# Patient Record
Sex: FEMALE | Race: WHITE | NOT HISPANIC OR LATINO | Employment: PART TIME
[De-identification: names, ages, dates, MRNs, and addresses within clinical notes are randomized per-mention and may not be internally consistent; named-entity substitution may affect disease eponyms.]

---

## 2016-09-01 LAB — PAP-ABSTRACT: NORMAL

## 2017-06-03 ENCOUNTER — HEALTH MAINTENANCE LETTER (OUTPATIENT)
Age: 62
End: 2017-06-03

## 2017-07-27 ENCOUNTER — HOSPITAL ENCOUNTER (OUTPATIENT)
Dept: MAMMOGRAPHY | Facility: CLINIC | Age: 62
Discharge: HOME | End: 2017-07-27
Attending: OBSTETRICS & GYNECOLOGY | Admitting: OBSTETRICS & GYNECOLOGY
Payer: COMMERCIAL

## 2017-07-27 DIAGNOSIS — Z12.31 VISIT FOR SCREENING MAMMOGRAM: ICD-10-CM

## 2017-07-27 PROCEDURE — 77063 BREAST TOMOSYNTHESIS BI: CPT

## 2017-07-27 PROCEDURE — G0202 SCR MAMMO BI INCL CAD: HCPCS

## 2017-08-24 ENCOUNTER — RADIANT APPOINTMENT (OUTPATIENT)
Dept: GENERAL RADIOLOGY | Facility: CLINIC | Age: 62
End: 2017-08-24
Attending: PHYSICIAN ASSISTANT
Payer: COMMERCIAL

## 2017-08-24 ENCOUNTER — OFFICE VISIT (OUTPATIENT)
Dept: FAMILY MEDICINE | Facility: CLINIC | Age: 62
End: 2017-08-24
Payer: COMMERCIAL

## 2017-08-24 VITALS
DIASTOLIC BLOOD PRESSURE: 70 MMHG | HEIGHT: 65 IN | HEART RATE: 78 BPM | WEIGHT: 158.4 LBS | TEMPERATURE: 97.8 F | BODY MASS INDEX: 26.39 KG/M2 | SYSTOLIC BLOOD PRESSURE: 120 MMHG | OXYGEN SATURATION: 99 %

## 2017-08-24 DIAGNOSIS — R05.3 CHRONIC COUGH: ICD-10-CM

## 2017-08-24 DIAGNOSIS — M25.552 HIP PAIN, LEFT: ICD-10-CM

## 2017-08-24 DIAGNOSIS — M16.12 PRIMARY OSTEOARTHRITIS OF LEFT HIP: Primary | ICD-10-CM

## 2017-08-24 PROCEDURE — 99214 OFFICE O/P EST MOD 30 MIN: CPT | Performed by: PHYSICIAN ASSISTANT

## 2017-08-24 PROCEDURE — 73502 X-RAY EXAM HIP UNI 2-3 VIEWS: CPT

## 2017-08-24 RX ORDER — HYDROCODONE BITARTRATE AND ACETAMINOPHEN 5; 325 MG/1; MG/1
1 TABLET ORAL EVERY 4 HOURS PRN
Qty: 20 TABLET | Refills: 0 | Status: SHIPPED | OUTPATIENT
Start: 2017-08-24 | End: 2017-12-30

## 2017-08-24 RX ORDER — MONTELUKAST SODIUM 10 MG/1
10 TABLET ORAL AT BEDTIME
Qty: 30 TABLET | Refills: 0 | Status: SHIPPED | OUTPATIENT
Start: 2017-08-24 | End: 2017-12-30

## 2017-08-24 RX ORDER — PREDNISONE 20 MG/1
TABLET ORAL
Qty: 20 TABLET | Refills: 0 | Status: SHIPPED | OUTPATIENT
Start: 2017-08-24 | End: 2017-12-30

## 2017-08-24 NOTE — NURSING NOTE
"Chief Complaint   Patient presents with     Back Pain     pain in LL back times 1 day no known inury        Initial /70  Pulse 78  Temp 97.8  F (36.6  C) (Oral)  Ht 5' 5\" (1.651 m)  Wt 158 lb 6.4 oz (71.8 kg)  LMP 08/08/2009  SpO2 99%  BMI 26.36 kg/m2 Estimated body mass index is 26.36 kg/(m^2) as calculated from the following:    Height as of this encounter: 5' 5\" (1.651 m).    Weight as of this encounter: 158 lb 6.4 oz (71.8 kg).  Medication Reconciliation: complete  "

## 2017-08-24 NOTE — PROGRESS NOTES
"  SUBJECTIVE:   Denia Dailey is a 62 year old female who presents to clinic today for the following health issues:    Back Pain     Duration: 1 day         Specific cause: none     Description:   Location of pain: L low back, L lateral hip and buttock  Character of pain: Burning  Pain radiation: radiating to anterior groin and L thigh but pain does not extend beyond knee   New numbness or weakness in legs, not attributed to pain:  no     History:   History of back problems: multilevel degenerative disc disease. 2007 discectomy of L5 and S1 from a work injury   Any previous MRI or X-rays: Yes - MRI lumbar spine (7/7/07) & L knee (10/4/14)   Therapies tried without relief: NSAIDs    Alleviating factors:   Improved by: Nothing      Precipitating factors:  Worsened by: Sitting, Lying Flat and Walking    Denia has a PMH of multilevel degenerative disc disease and discectomy (2007). MRI in 2007 showed \"L5-S1: Prominent right disc extrusion with cranial extension of disc material and mass effect on the L5 and S1 nerve roots.\"  Yesterday Denia began feeling a burning pain in her low back when walking in her yard which radiated to locations listed above. No trauma, no falls, but she was sitting in a car all weekend. For mild relief Denia has been taking 800 mg of Ibuprofen every 4-5 hours.    Chronic Cough  Extensive workup by pulmonology.  3 year history that is associated with talking.  Was recommended gabapentin. Has tried PPI's, Claritin, albuterol, nasal spray, Zantac with no relief. Had endoscopy 1.5 years ago, diagnosed with gastritis, prescribed PPI and contracted Cdiff and very apprehensive to take these again for fear of recurrence.  Uses Zantac PRN but does not associate coughing with food ingestion.  Denia was a smoker in her 20's, CT scan of chest showed granulomas in 2016.  Also had spirometry that was normal.  Bronchoscopy was normal.  Wondering about possible options.  Pulmonology thought next step should " be polysomnography.    Problem list and histories reviewed & adjusted, as indicated.  Additional history: as documented    Patient Active Problem List   Diagnosis     Thoracic or lumbosacral neuritis or radiculitis, unspecified     Lumbago     Advanced directives, counseling/discussion     GERD (gastroesophageal reflux disease)     Chronic cough     Colitis due to Clostridium difficile     Past Surgical History:   Procedure Laterality Date     C CLOSED RX NOSE/JAW FRAC+WIRES  1966     C DISKECTOMY,PERCUTANEOUS LUMBAR  7/10/07    L5-S1 with nerve root decompression     ESOPHAGOSCOPY, GASTROSCOPY, DUODENOSCOPY (EGD), COMBINED N/A 3/15/2016    chronic gastritis     HC COLONOSCOPY THRU STOMA, DIAGNOSTIC  7/09    Diverticulosis.   Needs repeat in 10 yrs     ORTHOPEDIC SURGERY      bummionectomy     UPPER GI ENDOSCOPY  3/16    Erythematous mucosa in the greater curvature.        Social History   Substance Use Topics     Smoking status: Former Smoker     Smokeless tobacco: Never Used      Comment: quit age 33     Alcohol use Yes      Comment: 1-2 per month     Family History   Problem Relation Age of Onset     CANCER Father      renal cell CA     Breast Cancer Maternal Aunt      Coronary Artery Disease Maternal Grandmother      Coronary Artery Disease Paternal Grandmother      Coronary Artery Disease Paternal Uncle          Current Outpatient Prescriptions   Medication Sig Dispense Refill     predniSONE (DELTASONE) 20 MG tablet Take 60 mg daily for 3 days, then 40 mg daily for 3 days, then 20 mg daily for 3 days, then 10 mg for 4 days 20 tablet 0     HYDROcodone-acetaminophen (NORCO) 5-325 MG per tablet Take 1 tablet by mouth every 4 hours as needed for pain maximum 4 tablet(s) per day 20 tablet 0     montelukast (SINGULAIR) 10 MG tablet Take 1 tablet (10 mg) by mouth At Bedtime 30 tablet 0     Cholecalciferol (VITAMIN D) 2000 UNITS tablet Take 2,000 Units by mouth daily 100 tablet 3     magnesium 250 MG tablet Take 1  "tablet by mouth daily. 100 tablet 3     CALCIUM + D 600-200 MG-UNIT OR TABS TWICE A DAY       Allergies   Allergen Reactions     Vioxx      stomach upset     Nickel Rash     Accompanied by itching and swelling     Reviewed and updated as needed this visit by clinical staff  fTobacco  Allergies  Meds  Problems  Med Hx  Surg Hx  Fam Hx  Soc Hx        Reviewed and updated as needed this visit by Provider  Tobacco  Allergies  Meds  Problems  Med Hx  Surg Hx  Fam Hx  Soc Hx        ROS:  Constitutional, HEENT, cardiovascular, pulmonary, GI, , musculoskeletal, neuro, skin, endocrine and psych systems are negative, except as otherwise noted.    This document serves as a record of the services and decisions personally performed and made by Minerva Lechuga PA-C. It was created on her behalf by Pillo Patel, a trained medical scribe. The creation of this document is based the provider's statements to the medical scribe.  Scribmahesh Patel 11:36 AM, August 24, 2017    OBJECTIVE:   /70  Pulse 78  Temp 97.8  F (36.6  C) (Oral)  Ht 5' 5\" (1.651 m)  Wt 158 lb 6.4 oz (71.8 kg)  LMP 08/08/2009  SpO2 99%  BMI 26.36 kg/m2  Body mass index is 26.36 kg/(m^2).     GENERAL: healthy, alert and no distress  MS: No tenderness in sciatic notch, perilumbar musculature, SI joint, or greater trochanter. no gross musculoskeletal defects noted, no edema  SKIN: no suspicious lesions or rashes  NEURO: Normal strength and tone, mentation intact and speech normal  PSYCH: mentation appears normal, affect normal/bright    Diagnostic Test Results: Hip XR pelvis with Hip Left 1 view  Impression: Initial impression in clinic found arthritis in L hip joint, radiology impression pending.     Recent Results (from the past 744 hour(s))   XR Pelvis w Hip Left 1 View    Narrative    PELVIS AND HIP LEFT ONE VIEW   8/24/2017 11:57 AM     HISTORY: Pain in left hip.    COMPARISON: 6/8/2006.      Impression    IMPRESSION: " Minimal degenerative change. No acute fracture or  dislocation. No significant change from prior.    RODNEY KIRKPATRICK MD       ASSESSMENT/PLAN:   Denia was seen today for back pain.    Diagnoses and all orders for this visit:    Primary osteoarthritis of left hip; Hip pain, left  History of multilevel degenerative disc disease. S/p discectomy 2007. Pain likely related to hip OA.  Recommend intra-articular injection.  Pt will start prednisone taper and use hydrocodone PRN for nighttime pain.  -     predniSONE (DELTASONE) 20 MG tablet; Take 60 mg daily for 3 days, then 40 mg daily for 3 days, then 20 mg daily for 3 days, then 10 mg for 4 days  -     XR Joint Injection Major Left; Future  -     HYDROcodone-acetaminophen (NORCO) 5-325 MG per tablet; Take 1 tablet by mouth every 4 hours as needed for pain maximum 4 tablet(s) per day  -     XR Pelvis w Hip Left 1 View; Future    Chronic cough  Unclear etiology.  Will do trial of montelukast  -certirizine not helpful.  Extensive workup with bronchoscopy without definitive diagnosis.  [facility] suggested possible polysomnography as next step but pt apprehensive as she does not feel that she has any sleep issues.  They also suggested possible trial of gabapentin which she is interested in.  She has used PPIs in the past but developed C. Diff and is apprehensive to start this again.  Her last EGD did show gastritis in 2016 - she uses Zantac PRN and does not associate her cough with food ingestion.  -     montelukast (SINGULAIR) 10 MG tablet; Take 1 tablet (10 mg) by mouth At Bedtime    The information in this document, created by the medical scribe for me, accurately reflects the services I personally performed and the decisions made by me. I have reviewed and approved this document for accuracy prior to leaving the patient care area.  12:25 AM, 08/24/17    Amisha Giraldo PA-C  [facility]

## 2017-08-24 NOTE — MR AVS SNAPSHOT
After Visit Summary   8/24/2017    Denia Dailey    MRN: [id number]           Patient Information     Date Of Birth          1955        Visit Information        Provider Department      8/24/2017 11:00 AM Amisha Giraldo PA-C [facility]        Today's Diagnoses     Primary osteoarthritis of left hip    -  1    Hip pain, left        Chronic cough           Follow-ups after your visit        Future tests that were ordered for you today     Open Future Orders        Priority Expected Expires Ordered    XR Joint Injection Major Left Routine 8/24/2017 8/24/2018 8/24/2017            Who to contact     If you have questions or need follow up information about today's clinic visit or your schedule please contact [facility] directly at [phone].  Normal or non-critical lab and imaging results will be communicated to you by Kubi Mobihart, letter or phone within 4 business days after the clinic has received the results. If you do not hear from us within 7 days, please contact the clinic through Kubi Mobihart or phone. If you have a critical or abnormal lab result, we will notify you by phone as soon as possible.  Submit refill requests through Herrenschmiede or call your pharmacy and they will forward the refill request to us. Please allow 3 business days for your refill to be completed.          Additional Information About Your Visit        MyChart Information     Herrenschmiede gives you secure access to your electronic health record. If you see a primary care provider, you can also send messages to your care team and make appointments. If you have questions, please call your primary care clinic.  If you do not have a primary care provider, please call [phone] and they will assist you.        Care EveryWhere ID     This is your Care EveryWhere ID. This could be used by other organizations to access your [facility] medical records  [id number]        Your Vitals Were     Pulse  "Temperature Height Last Period Pulse Oximetry BMI (Body Mass Index)    78 97.8  F (36.6  C) (Oral) 5' 5\" (1.651 m) 08/08/2009 99% 26.36 kg/m2       Blood Pressure from Last 3 Encounters:   08/24/17 120/70   06/24/16 107/74   06/03/16 126/86    Weight from Last 3 Encounters:   08/24/17 158 lb 6.4 oz (71.8 kg)   06/03/16 157 lb (71.2 kg)   05/25/16 160 lb (72.6 kg)                 Today's Medication Changes          These changes are accurate as of: 8/24/17 12:24 PM.  If you have any questions, ask your nurse or doctor.               Start taking these medicines.        Dose/Directions    HYDROcodone-acetaminophen 5-325 MG per tablet   Commonly known as:  NORCO   Used for:  Primary osteoarthritis of left hip   Started by:  Amisha Giraldo PA-C        Dose:  1 tablet   Take 1 tablet by mouth every 4 hours as needed for pain maximum 4 tablet(s) per day   Quantity:  20 tablet   Refills:  0       montelukast 10 MG tablet   Commonly known as:  SINGULAIR   Used for:  Chronic cough   Started by:  Amisha Giraldo PA-C        Dose:  10 mg   Take 1 tablet (10 mg) by mouth At Bedtime   Quantity:  30 tablet   Refills:  0       predniSONE 20 MG tablet   Commonly known as:  DELTASONE   Used for:  Primary osteoarthritis of left hip   Started by:  Amisha Giraldo PA-C        Take 60 mg daily for 3 days, then 40 mg daily for 3 days, then 20 mg daily for 3 days, then 10 mg for 4 days   Quantity:  20 tablet   Refills:  0            Where to get your medicines      These medications were sent to [facility] [place], [place] - [address]  [address] [facility] [address] [address]     Phone:  [phone]     montelukast 10 MG tablet    predniSONE 20 MG tablet         Some of these will need a paper prescription and others can be bought over the counter.  Ask your nurse if you have questions.     Bring a paper prescription for each of these medications     HYDROcodone-acetaminophen 5-325 " MG per tablet                Primary Care Provider Office Phone # Fax #    Pa [place] [phone] [address] [facility] [address]        Equal Access to Services     RUBEN CHAPA : Hadii ariane rios stephanieo Soelkeali, waaxda luqadaha, qaybta kaalmada adeambrose, leonard olivas laNbaasohk lynn. So [facility] [phone].    ATENCIÓN: Si habla español, tiene a parikh disposición servicios gratuitos de asistencia lingüística. [facility] [phone].    We comply with applicable federal civil rights laws and [place] laws. We do not discriminate on the basis of race, color, national origin, age, disability sex, sexual orientation or gender identity.            Thank you!     Thank you for choosing [facility]  for your care. Our goal is always to provide you with excellent care. Hearing back from our patients is one way we can continue to improve our services. Please take a few minutes to complete the written survey that you may receive in the mail after your visit with us. Thank you!             Your Updated Medication List - Protect others around you: Learn how to safely use, store and throw away your medicines at www.disposemymeds.org.          This list is accurate as of: 8/24/17 12:24 PM.  Always use your most recent med list.                   Brand Name Dispense Instructions for use Diagnosis    calcium + D 600-200 MG-UNIT Tabs   Generic drug:  calcium carbonate-vitamin D      TWICE A DAY        HYDROcodone-acetaminophen 5-325 MG per tablet    NORCO    20 tablet    Take 1 tablet by mouth every 4 hours as needed for pain maximum 4 tablet(s) per day    Primary osteoarthritis of left hip       magnesium 250 MG tablet     100 tablet    Take 1 tablet by mouth daily.        montelukast 10 MG tablet    SINGULAIR    30 tablet    Take 1 tablet (10 mg) by mouth At Bedtime    Chronic cough       predniSONE 20 MG tablet    DELTASONE    20 tablet    Take 60 mg daily for 3 days,  then 40 mg daily for 3 days, then 20 mg daily for 3 days, then 10 mg for 4 days    Primary osteoarthritis of left hip       vitamin D 2000 UNITS tablet     100 tablet    Take 2,000 Units by mouth daily

## 2017-08-29 DIAGNOSIS — M16.12 PRIMARY OSTEOARTHRITIS OF LEFT HIP: Primary | ICD-10-CM

## 2017-09-06 ENCOUNTER — HOSPITAL ENCOUNTER (OUTPATIENT)
Dept: GENERAL RADIOLOGY | Facility: CLINIC | Age: 62
Discharge: HOME | End: 2017-09-06
Attending: PHYSICIAN ASSISTANT | Admitting: PHYSICIAN ASSISTANT
Payer: COMMERCIAL

## 2017-09-06 VITALS — HEART RATE: 72 BPM | SYSTOLIC BLOOD PRESSURE: 137 MMHG | DIASTOLIC BLOOD PRESSURE: 84 MMHG

## 2017-09-06 DIAGNOSIS — M16.12 PRIMARY OSTEOARTHRITIS OF LEFT HIP: ICD-10-CM

## 2017-09-06 PROCEDURE — 25000128 H RX IP 250 OP 636

## 2017-09-06 PROCEDURE — 25500064 ZZH RX 255 OP 636: Performed by: PHYSICIAN ASSISTANT

## 2017-09-06 PROCEDURE — 25000125 ZZHC RX 250

## 2017-09-06 PROCEDURE — S0020 INJECTION, BUPIVICAINE HYDRO: HCPCS

## 2017-09-06 PROCEDURE — 20610 DRAIN/INJ JOINT/BURSA W/O US: CPT | Mod: LT

## 2017-09-06 RX ORDER — BUPIVACAINE HYDROCHLORIDE 5 MG/ML
4 INJECTION, SOLUTION EPIDURAL; INTRACAUDAL ONCE
Status: COMPLETED | OUTPATIENT
Start: 2017-09-06 | End: 2017-09-06

## 2017-09-06 RX ORDER — BUPIVACAINE HYDROCHLORIDE 5 MG/ML
INJECTION, SOLUTION EPIDURAL; INTRACAUDAL
Status: COMPLETED
Start: 2017-09-06 | End: 2017-09-06

## 2017-09-06 RX ORDER — LIDOCAINE HYDROCHLORIDE 10 MG/ML
INJECTION, SOLUTION INFILTRATION; PERINEURAL
Status: COMPLETED
Start: 2017-09-06 | End: 2017-09-06

## 2017-09-06 RX ORDER — TRIAMCINOLONE ACETONIDE 40 MG/ML
INJECTION, SUSPENSION INTRA-ARTICULAR; INTRAMUSCULAR
Status: COMPLETED
Start: 2017-09-06 | End: 2017-09-06

## 2017-09-06 RX ORDER — TRIAMCINOLONE ACETONIDE 40 MG/ML
40 INJECTION, SUSPENSION INTRA-ARTICULAR; INTRAMUSCULAR ONCE
Status: COMPLETED | OUTPATIENT
Start: 2017-09-06 | End: 2017-09-06

## 2017-09-06 RX ORDER — IOPAMIDOL 408 MG/ML
10 INJECTION, SOLUTION INTRATHECAL ONCE
Status: COMPLETED | OUTPATIENT
Start: 2017-09-06 | End: 2017-09-06

## 2017-09-06 RX ORDER — LIDOCAINE HYDROCHLORIDE 10 MG/ML
5 INJECTION, SOLUTION EPIDURAL; INFILTRATION; INTRACAUDAL; PERINEURAL ONCE
Status: COMPLETED | OUTPATIENT
Start: 2017-09-06 | End: 2017-09-06

## 2017-09-06 RX ADMIN — BUPIVACAINE HYDROCHLORIDE 20 MG: 5 INJECTION, SOLUTION EPIDURAL; INTRACAUDAL; PERINEURAL at 10:49

## 2017-09-06 RX ADMIN — IOPAMIDOL 3 ML: 408 INJECTION, SOLUTION INTRATHECAL at 10:49

## 2017-09-06 RX ADMIN — LIDOCAINE HYDROCHLORIDE 40 MG: 10 INJECTION, SOLUTION EPIDURAL; INFILTRATION; INTRACAUDAL; PERINEURAL at 10:48

## 2017-09-06 RX ADMIN — TRIAMCINOLONE ACETONIDE 40 MG: 40 INJECTION, SUSPENSION INTRA-ARTICULAR; INTRAMUSCULAR at 10:48

## 2017-09-06 RX ADMIN — BUPIVACAINE HYDROCHLORIDE 20 MG: 5 INJECTION, SOLUTION EPIDURAL; INTRACAUDAL at 10:49

## 2017-09-06 RX ADMIN — LIDOCAINE HYDROCHLORIDE 40 MG: 10 INJECTION, SOLUTION INFILTRATION; PERINEURAL at 10:48

## 2017-09-06 NOTE — PROGRESS NOTES
RADIOLOGY PROCEDURE NOTE  Patient name: Denia Dailey  MRN: [id number]  : 1955    Pre-procedure diagnosis: Left hip pain  Post-procedure diagnosis: Same    Procedure Date/Time: 2017  10:38 AM  Procedure: Left hip steroid injection  Estimated blood loss: None  Specimen(s) collected with description: none  The patient tolerated the procedure well with no immediate complications.  Significant findings:none    See imaging dictation for procedural details.    Provider name: Tristin Vizcarra  Assistant(s):None

## 2017-09-06 NOTE — PROGRESS NOTES
Pt tolerated ortho procedure well, post procedure pain level unchanged at 0. There were no complications during procedure. Pt verbalized understanding of written and verbal instructions and left department in stable and satisfactory condition with mother. There is no evidence of bleeding upon discharge.

## 2017-11-05 ENCOUNTER — MYC MEDICAL ADVICE (OUTPATIENT)
Dept: FAMILY MEDICINE | Facility: CLINIC | Age: 62
End: 2017-11-05

## 2017-11-05 DIAGNOSIS — R05.3 CHRONIC COUGH: Primary | ICD-10-CM

## 2017-12-30 ENCOUNTER — OFFICE VISIT (OUTPATIENT)
Dept: FAMILY MEDICINE | Facility: CLINIC | Age: 62
End: 2017-12-30
Payer: COMMERCIAL

## 2017-12-30 VITALS
TEMPERATURE: 97.7 F | BODY MASS INDEX: 24.63 KG/M2 | HEART RATE: 115 BPM | SYSTOLIC BLOOD PRESSURE: 126 MMHG | WEIGHT: 148 LBS | OXYGEN SATURATION: 100 % | DIASTOLIC BLOOD PRESSURE: 74 MMHG

## 2017-12-30 DIAGNOSIS — R63.4 LOSS OF WEIGHT: ICD-10-CM

## 2017-12-30 DIAGNOSIS — Z11.59 NEED FOR HEPATITIS C SCREENING TEST: ICD-10-CM

## 2017-12-30 DIAGNOSIS — R68.81 EARLY SATIETY: ICD-10-CM

## 2017-12-30 DIAGNOSIS — Z00.00 PREVENTATIVE HEALTH CARE: ICD-10-CM

## 2017-12-30 DIAGNOSIS — K21.00 GASTROESOPHAGEAL REFLUX DISEASE WITH ESOPHAGITIS: ICD-10-CM

## 2017-12-30 DIAGNOSIS — H66.011 ACUTE SUPPURATIVE OTITIS MEDIA OF RIGHT EAR WITH SPONTANEOUS RUPTURE OF TYMPANIC MEMBRANE, RECURRENCE NOT SPECIFIED: Primary | ICD-10-CM

## 2017-12-30 DIAGNOSIS — Z12.11 SCREEN FOR COLON CANCER: ICD-10-CM

## 2017-12-30 LAB
BASOPHILS # BLD AUTO: 0 10E9/L (ref 0–0.2)
BASOPHILS NFR BLD AUTO: 0.4 %
DIFFERENTIAL METHOD BLD: NORMAL
EOSINOPHIL # BLD AUTO: 0.1 10E9/L (ref 0–0.7)
EOSINOPHIL NFR BLD AUTO: 1.4 %
ERYTHROCYTE [DISTWIDTH] IN BLOOD BY AUTOMATED COUNT: 13.2 % (ref 10–15)
HCT VFR BLD AUTO: 45.8 % (ref 35–47)
HGB BLD-MCNC: 15.3 G/DL (ref 11.7–15.7)
LYMPHOCYTES # BLD AUTO: 0.8 10E9/L (ref 0.8–5.3)
LYMPHOCYTES NFR BLD AUTO: 10.2 %
MCH RBC QN AUTO: 30.4 PG (ref 26.5–33)
MCHC RBC AUTO-ENTMCNC: 33.4 G/DL (ref 31.5–36.5)
MCV RBC AUTO: 91 FL (ref 78–100)
MONOCYTES # BLD AUTO: 0.7 10E9/L (ref 0–1.3)
MONOCYTES NFR BLD AUTO: 8.2 %
NEUTROPHILS # BLD AUTO: 6.4 10E9/L (ref 1.6–8.3)
NEUTROPHILS NFR BLD AUTO: 79.8 %
PLATELET # BLD AUTO: 259 10E9/L (ref 150–450)
RBC # BLD AUTO: 5.03 10E12/L (ref 3.8–5.2)
WBC # BLD AUTO: 8 10E9/L (ref 4–11)

## 2017-12-30 PROCEDURE — 36415 COLL VENOUS BLD VENIPUNCTURE: CPT | Performed by: PHYSICIAN ASSISTANT

## 2017-12-30 PROCEDURE — 80050 GENERAL HEALTH PANEL: CPT | Performed by: PHYSICIAN ASSISTANT

## 2017-12-30 PROCEDURE — 87389 HIV-1 AG W/HIV-1&-2 AB AG IA: CPT | Performed by: PHYSICIAN ASSISTANT

## 2017-12-30 PROCEDURE — 86803 HEPATITIS C AB TEST: CPT | Performed by: PHYSICIAN ASSISTANT

## 2017-12-30 PROCEDURE — 99214 OFFICE O/P EST MOD 30 MIN: CPT | Performed by: PHYSICIAN ASSISTANT

## 2017-12-30 NOTE — NURSING NOTE
"Chief Complaint   Patient presents with     Ear Problem       Initial /74  Pulse 115  Temp 97.7  F (36.5  C) (Oral)  Wt 148 lb (67.1 kg)  LMP 08/08/2009  SpO2 100%  BMI 24.63 kg/m2 Estimated body mass index is 24.63 kg/(m^2) as calculated from the following:    Height as of 8/24/17: 5' 5\" (1.651 m).    Weight as of this encounter: 148 lb (67.1 kg).  Medication Reconciliation: complete     Alicia Gramajo MA      "

## 2017-12-30 NOTE — MR AVS SNAPSHOT
After Visit Summary   12/30/2017    Denia Dailey    MRN: [id number]           Patient Information     Date Of Birth          1955        Visit Information        Provider Department      12/30/2017 10:20 AM Amisha Giraldo PA-C [facility]        Today's Diagnoses     Acute suppurative otitis media of right ear with spontaneous rupture of tympanic membrane, recurrence not specified    -  1    Loss of weight        Early satiety        Gastroesophageal reflux disease with esophagitis        Need for hepatitis C screening test        Screen for colon cancer           Follow-ups after your visit        Future tests that were ordered for you today     Open Future Orders        Priority Expected Expires Ordered    Fecal colorectal cancer screen (FIT) Routine 1/20/2018 3/24/2018 12/30/2017    US Abdomen Complete Routine  12/30/2018 12/30/2017            Who to contact     If you have questions or need follow up information about today's clinic visit or your schedule please contact [facility] directly at [phone].  Normal or non-critical lab and imaging results will be communicated to you by Kikohart, letter or phone within 4 business days after the clinic has received the results. If you do not hear from us within 7 days, please contact the clinic through Docphint or phone. If you have a critical or abnormal lab result, we will notify you by phone as soon as possible.  Submit refill requests through MRO or call your pharmacy and they will forward the refill request to us. Please allow 3 business days for your refill to be completed.          Additional Information About Your Visit        MyChart Information     MRO gives you secure access to your electronic health record. If you see a primary care provider, you can also send messages to your care team and make appointments. If you have questions, please call your primary care clinic.  If you do not have  a primary care provider, please call [phone] and they will assist you.        Care EveryWhere ID     This is your Care EveryWhere ID. This could be used by other organizations to access your [facility] medical records  [id number]        Your Vitals Were     Pulse Temperature Last Period Pulse Oximetry BMI (Body Mass Index)       115 97.7  F (36.5  C) (Oral) 08/08/2009 100% 24.63 kg/m2        Blood Pressure from Last 3 Encounters:   12/30/17 126/74   09/06/17 137/84   08/24/17 120/70    Weight from Last 3 Encounters:   12/30/17 148 lb (67.1 kg)   08/24/17 158 lb 6.4 oz (71.8 kg)   06/03/16 157 lb (71.2 kg)              We Performed the Following     CBC with platelets and differential     Comprehensive metabolic panel     Hepatitis C Screen Reflex to HCV RNA Quant and Genotype     HIV Antigen Antibody Combo     TSH with free T4 reflex          Today's Medication Changes          These changes are accurate as of: 12/30/17 10:43 AM.  If you have any questions, ask your nurse or doctor.               Start taking these medicines.        Dose/Directions    amoxicillin-clavulanate 875-125 MG per tablet   Commonly known as:  AUGMENTIN   Used for:  Acute suppurative otitis media of right ear with spontaneous rupture of tympanic membrane, recurrence not specified   Started by:  Amisha Giraldo PA-C        Dose:  1 tablet   Take 1 tablet by mouth 2 times daily for 10 days   Quantity:  20 tablet   Refills:  0       ranitidine 150 MG tablet   Commonly known as:  ZANTAC   Used for:  Gastroesophageal reflux disease with esophagitis   Started by:  Amisha Giraldo PA-C        Dose:  150 mg   Take 1 tablet (150 mg) by mouth 2 times daily   Quantity:  60 tablet   Refills:  1            Where to get your medicines      These medications were sent to [facility] [place] [facility] [address] [place] [address]  [address] [place] [address]     Phone:  [phone]      amoxicillin-clavulanate 875-125 MG per tablet         Some of these will need a paper prescription and others can be bought over the counter.  Ask your nurse if you have questions.     You don't need a prescription for these medications     ranitidine 150 MG tablet                Primary Care Provider Office Phone # Fax #    Pa [place] [phone]       [address] [place] [address]        Equal Access to Services     RUBEN CHAPA : Hadii aad ku hadasho Soomaali, waaxda luqadaha, qaybta kaalmada adeegyada, waxay idiin hayaan adelarissa mercadovidyaeric lalee . So [facility] [phone].    ATENCIÓN: Si rupa pang, tiene a parikh disposición servicios gratuitos de asistencia lingüística. [facility] [phone].    We comply with applicable federal civil rights laws and [place] laws. We do not discriminate on the basis of race, color, national origin, age, disability, sex, sexual orientation, or gender identity.            Thank you!     Thank you for choosing [facility]  for your care. Our goal is always to provide you with excellent care. Hearing back from our patients is one way we can continue to improve our services. Please take a few minutes to complete the written survey that you may receive in the mail after your visit with us. Thank you!             Your Updated Medication List - Protect others around you: Learn how to safely use, store and throw away your medicines at www.disposemymeds.org.          This list is accurate as of: 12/30/17 10:43 AM.  Always use your most recent med list.                   Brand Name Dispense Instructions for use Diagnosis    amoxicillin-clavulanate 875-125 MG per tablet    AUGMENTIN    20 tablet    Take 1 tablet by mouth 2 times daily for 10 days    Acute suppurative otitis media of right ear with spontaneous rupture of tympanic membrane, recurrence not specified       calcium + D 600-200 MG-UNIT Tabs   Generic drug:  calcium carbonate-vitamin D       TWICE A DAY        fluticasone-vilanterol 200-25 MCG/INH oral inhaler    BREO ELLIPTA    1 Inhaler    Inhale 1 puff into the lungs daily    Chronic cough       magnesium 250 MG tablet     100 tablet    Take 1 tablet by mouth daily.        ranitidine 150 MG tablet    ZANTAC    60 tablet    Take 1 tablet (150 mg) by mouth 2 times daily    Gastroesophageal reflux disease with esophagitis       vitamin D 2000 UNITS tablet     100 tablet    Take 2,000 Units by mouth daily

## 2017-12-30 NOTE — PROGRESS NOTES
SUBJECTIVE:   Denia Dailey is a 62 year old female who presents to clinic today for the following health issues:      Acute Illness   Acute illness concerns: bleeding from ear, sinus symptoms x 1 week.  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  Had EGD 2016 that showed chronic gastritis but pt denies significant symptoms.  Normal colonoscopy 2009.  Denies dark stools or any other symptoms.    Wt Readings from Last 5 Encounters:   12/30/17 148 lb (67.1 kg)   08/24/17 158 lb 6.4 oz (71.8 kg)   06/03/16 157 lb (71.2 kg)   05/25/16 160 lb (72.6 kg)   05/09/16 161 lb 1 oz (73.1 kg)       Problem list and histories reviewed & adjusted, as indicated.  Additional history: as documented    Patient Active Problem List   Diagnosis     Thoracic or lumbosacral neuritis or radiculitis, unspecified     Lumbago     Advanced directives, counseling/discussion     GERD (gastroesophageal reflux disease)     Chronic cough     Colitis due to Clostridium difficile     Past Surgical History:   Procedure Laterality Date     C CLOSED RX NOSE/JAW FRAC+WIRES  1966     C DISKECTOMY,PERCUTANEOUS LUMBAR  7/10/07    L5-S1 with nerve root decompression     ESOPHAGOSCOPY, GASTROSCOPY, DUODENOSCOPY (EGD), COMBINED N/A 3/15/2016    chronic gastritis     HC COLONOSCOPY THRU STOMA, DIAGNOSTIC  7/09    Diverticulosis.   Needs repeat in 10 yrs     ORTHOPEDIC SURGERY       bummionectomy     UPPER GI ENDOSCOPY  3/16    Erythematous mucosa in the greater curvature.        Social History   Substance Use Topics     Smoking status: Former Smoker     Smokeless tobacco: Never Used      Comment: quit age 33     Alcohol use Yes      Comment: 1-2 per month     Family History   Problem Relation Age of Onset     CANCER Father      renal cell CA     Breast Cancer Maternal Aunt      Coronary Artery Disease Maternal Grandmother      Coronary Artery Disease Paternal Grandmother      Coronary Artery Disease Paternal Uncle          Current Outpatient Prescriptions   Medication Sig Dispense Refill     amoxicillin-clavulanate (AUGMENTIN) 875-125 MG per tablet Take 1 tablet by mouth 2 times daily for 10 days 20 tablet 0     ranitidine (ZANTAC) 150 MG tablet Take 1 tablet (150 mg) by mouth 2 times daily 60 tablet 1     fluticasone-vilanterol (BREO ELLIPTA) 200-25 MCG/INH oral inhaler Inhale 1 puff into the lungs daily 1 Inhaler 3     Cholecalciferol (VITAMIN D) 2000 UNITS tablet Take 2,000 Units by mouth daily 100 tablet 3     CALCIUM + D 600-200 MG-UNIT OR TABS TWICE A DAY       magnesium 250 MG tablet Take 1 tablet by mouth daily. 100 tablet 3     Allergies   Allergen Reactions     Vioxx      stomach upset     Nickel Rash     Accompanied by itching and swelling     BP Readings from Last 3 Encounters:   12/30/17 126/74   09/06/17 137/84   08/24/17 120/70    Wt Readings from Last 3 Encounters:   12/30/17 148 lb (67.1 kg)   08/24/17 158 lb 6.4 oz (71.8 kg)   06/03/16 157 lb (71.2 kg)                    Reviewed and updated as needed this visit by clinical staff  Tobacco  Allergies  Meds  Problems  Med Hx  Surg Hx  Fam Hx  Soc Hx        Reviewed and updated as needed this visit by Provider  Tobacco  Allergies  Meds  Problems  Med Hx  Surg Hx  Fam Hx  Soc Hx          ROS:  Constitutional, HEENT, cardiovascular, pulmonary, GI, , musculoskeletal, neuro, skin, endocrine and psych systems are  negative, except as otherwise noted.      OBJECTIVE:   /74  Pulse 115  Temp 97.7  F (36.5  C) (Oral)  Wt 148 lb (67.1 kg)  LMP 08/08/2009  SpO2 100%  BMI 24.63 kg/m2  Body mass index is 24.63 kg/(m^2).  GENERAL: healthy, alert and no distress  EYES: Eyes grossly normal to inspection, PERRL and conjunctivae and sclerae normal  HENT: ear canals and right TM complete rupture with bloody/mucus discharge, nose and mouth without ulcers or lesions  NECK: no adenopathy, no asymmetry, masses, or scars and thyroid normal to palpation  RESP: lungs clear to auscultation - no rales, rhonchi or wheezes  CV: regular rate and rhythm, normal S1 S2, no S3 or S4, no murmur, click or rub, no peripheral edema and peripheral pulses strong  ABDOMEN: soft, nontender, no hepatosplenomegaly, no masses and bowel sounds normal  MS: no gross musculoskeletal defects noted, no edema  SKIN: no suspicious lesions or rashes  NEURO: Normal strength and tone, mentation intact and speech normal  PSYCH: mentation appears normal, affect normal/bright    Diagnostic Test Results:  Results for orders placed or performed in visit on 12/30/17 (from the past 24 hour(s))   CBC with platelets and differential   Result Value Ref Range    WBC 8.0 4.0 - 11.0 10e9/L    RBC Count 5.03 3.8 - 5.2 10e12/L    Hemoglobin 15.3 11.7 - 15.7 g/dL    Hematocrit 45.8 35.0 - 47.0 %    MCV 91 78 - 100 fl    MCH 30.4 26.5 - 33.0 pg    MCHC 33.4 31.5 - 36.5 g/dL    RDW 13.2 10.0 - 15.0 %    Platelet Count 259 150 - 450 10e9/L    Diff Method Automated Method     % Neutrophils 79.8 %    % Lymphocytes 10.2 %    % Monocytes 8.2 %    % Eosinophils 1.4 %    % Basophils 0.4 %    Absolute Neutrophil 6.4 1.6 - 8.3 10e9/L    Absolute Lymphocytes 0.8 0.8 - 5.3 10e9/L    Absolute Monocytes 0.7 0.0 - 1.3 10e9/L    Absolute Eosinophils 0.1 0.0 - 0.7 10e9/L    Absolute Basophils 0.0 0.0 - 0.2 10e9/L       ASSESSMENT/PLAN:     Denia was seen today for ear problem.    Diagnoses and all  orders for this visit:    Acute suppurative otitis media of right ear with spontaneous rupture of tympanic membrane, recurrence not specified  -     amoxicillin-clavulanate (AUGMENTIN) 875-125 MG per tablet; Take 1 tablet by mouth 2 times daily for 10 days    Loss of weight  Early satiety  Will evaluate further with labs and imaging.  -     US Abdomen Complete; Future  -     Comprehensive metabolic panel  -     HIV Antigen Antibody Combo  -     TSH with free T4 reflex  -     CBC with platelets and differential    Gastroesophageal reflux disease with esophagitis  Trial of daily ranitidine.  -     ranitidine (ZANTAC) 150 MG tablet; Take 1 tablet (150 mg) by mouth 2 times daily    Need for hepatitis C screening test  -     Hepatitis C Screen Reflex to HCV RNA Quant and Genotype    Screen for colon cancer  -     Fecal colorectal cancer screen (FIT); Future      Amisha Giraldo, MS, PA-C  [facility]

## 2018-01-02 LAB
ALBUMIN SERPL-MCNC: 3.8 G/DL (ref 3.4–5)
ALP SERPL-CCNC: 104 U/L (ref 40–150)
ALT SERPL W P-5'-P-CCNC: 26 U/L (ref 0–50)
ANION GAP SERPL CALCULATED.3IONS-SCNC: 9 MMOL/L (ref 3–14)
AST SERPL W P-5'-P-CCNC: 22 U/L (ref 0–45)
BILIRUB SERPL-MCNC: 0.6 MG/DL (ref 0.2–1.3)
BUN SERPL-MCNC: 13 MG/DL (ref 7–30)
CALCIUM SERPL-MCNC: 8.8 MG/DL (ref 8.5–10.1)
CHLORIDE SERPL-SCNC: 105 MMOL/L (ref 94–109)
CO2 SERPL-SCNC: 26 MMOL/L (ref 20–32)
CREAT SERPL-MCNC: 0.74 MG/DL (ref 0.52–1.04)
GFR SERPL CREATININE-BSD FRML MDRD: 80 ML/MIN/1.7M2
GLUCOSE SERPL-MCNC: 89 MG/DL (ref 70–99)
HCV AB SERPL QL IA: NONREACTIVE
POTASSIUM SERPL-SCNC: 3.9 MMOL/L (ref 3.4–5.3)
PROT SERPL-MCNC: 7 G/DL (ref 6.8–8.8)
SODIUM SERPL-SCNC: 140 MMOL/L (ref 133–144)
TSH SERPL DL<=0.005 MIU/L-ACNC: 0.91 MU/L (ref 0.4–4)

## 2018-01-03 LAB — HIV 1+2 AB+HIV1 P24 AG SERPL QL IA: NONREACTIVE

## 2018-01-03 NOTE — PROGRESS NOTES
Denia  I have reviewed your recent labs. Here are the results:    -Liver and gallbladder tests are normal. (ALT,AST, Alk phos, bilirubin), kidney function is normal (Cr, GFR), Sodium is normal, Potassium is normal, Calcium is normal, Glucose is normal (diabetes screening test).   -TSH (thyroid stimulating hormone) level is normal which indicates normal thyroid function.  -Normal red blood cell (hgb) levels, normal white blood cell count and normal platelet levels.  -HIV and Hepatitis C tests are normal.    Please complete the FIT stool test and abdominal ultrasound so we can determine how to best proceed.    For additional lab test information, [email or web address].org is an excellent reference.     If you have any questions please do not hesitate to contact our office via phone ([phone]) or MyChart.    Amisha Giraldo, MS, PA-C  [facility] [place]

## 2018-01-08 ENCOUNTER — HOSPITAL ENCOUNTER (OUTPATIENT)
Dept: ULTRASOUND IMAGING | Facility: CLINIC | Age: 63
Discharge: HOME | End: 2018-01-08
Attending: PHYSICIAN ASSISTANT | Admitting: PHYSICIAN ASSISTANT
Payer: COMMERCIAL

## 2018-01-08 DIAGNOSIS — R68.81 EARLY SATIETY: ICD-10-CM

## 2018-01-08 DIAGNOSIS — R63.4 LOSS OF WEIGHT: ICD-10-CM

## 2018-01-08 PROCEDURE — 82274 ASSAY TEST FOR BLOOD FECAL: CPT | Performed by: PHYSICIAN ASSISTANT

## 2018-01-08 PROCEDURE — 76700 US EXAM ABDOM COMPLETE: CPT

## 2018-01-09 NOTE — PROGRESS NOTES
Denia  Here are your recent results.  Your abdominal ultrasound is normal.  If you are continuing to have  unintentional weight loss please make an office visit to evaluate further.  If you have any questions please do not hesitate to contact our office via phone ([phone]) or MyChart.    Amisha Giraldo, MS, PA-C  [facility] [place]

## 2018-01-11 DIAGNOSIS — Z12.11 SCREEN FOR COLON CANCER: ICD-10-CM

## 2018-01-11 LAB — HEMOCCULT STL QL IA: NEGATIVE

## 2018-01-11 NOTE — PROGRESS NOTES
Denia  I have reviewed your recent labs. Here are the results:    -FIT test (screening test for colon cancer) was normal. ADVISE - rechecking in 1 year.     If you have any questions please do not hesitate to contact our office via phone ([phone]) or CC videohart.    Amisha Giraldo, MS, PA-C  [facility] [place]

## 2018-02-16 ENCOUNTER — MYC REFILL (OUTPATIENT)
Dept: FAMILY MEDICINE | Facility: CLINIC | Age: 63
End: 2018-02-16

## 2018-02-16 DIAGNOSIS — R05.3 CHRONIC COUGH: ICD-10-CM

## 2018-02-16 NOTE — TELEPHONE ENCOUNTER
Message from [employer]:  Original authorizing provider: BAR Carrillo would like a refill of the following medications:  fluticasone-vilanterol (BREO ELLIPTA) 200-25 MCG/INH oral inhaler [Amisha Giraldo PA-C]    Preferred pharmacy: [facility] - [address]    Comment:

## 2018-02-19 ENCOUNTER — OFFICE VISIT (OUTPATIENT)
Dept: FAMILY MEDICINE | Facility: CLINIC | Age: 63
End: 2018-02-19
Payer: COMMERCIAL

## 2018-02-19 VITALS
DIASTOLIC BLOOD PRESSURE: 78 MMHG | TEMPERATURE: 98.4 F | SYSTOLIC BLOOD PRESSURE: 124 MMHG | WEIGHT: 146 LBS | OXYGEN SATURATION: 100 % | HEART RATE: 117 BPM | BODY MASS INDEX: 24.32 KG/M2 | HEIGHT: 65 IN

## 2018-02-19 DIAGNOSIS — Z23 NEED FOR PROPHYLACTIC VACCINATION WITH TETANUS-DIPHTHERIA (TD): ICD-10-CM

## 2018-02-19 DIAGNOSIS — J06.9 UPPER RESPIRATORY TRACT INFECTION, UNSPECIFIED TYPE: Primary | ICD-10-CM

## 2018-02-19 PROCEDURE — 90471 IMMUNIZATION ADMIN: CPT | Performed by: FAMILY MEDICINE

## 2018-02-19 PROCEDURE — 90715 TDAP VACCINE 7 YRS/> IM: CPT | Performed by: FAMILY MEDICINE

## 2018-02-19 PROCEDURE — 99213 OFFICE O/P EST LOW 20 MIN: CPT | Mod: 25 | Performed by: FAMILY MEDICINE

## 2018-02-19 NOTE — NURSING NOTE
"Chief Complaint   Patient presents with     Pharyngitis       Initial /78  Pulse 117  Temp 98.4  F (36.9  C) (Oral)  Ht 5' 5\" (1.651 m)  Wt 146 lb (66.2 kg)  LMP 08/08/2009  SpO2 100%  BMI 24.3 kg/m2 Estimated body mass index is 24.3 kg/(m^2) as calculated from the following:    Height as of this encounter: 5' 5\" (1.651 m).    Weight as of this encounter: 146 lb (66.2 kg).  Medication Reconciliation: complete   Rosario Aiken Student MA      "

## 2018-02-19 NOTE — PROGRESS NOTES
"  SUBJECTIVE:                                                    Denia Dailey is a 62 year old female who presents to clinic today for the following health issues:    Acute Illness   Acute illness concerns: Sore Throat  Onset: x 4 days    Fever: YES, 3 days ago    Chills/Sweats: YES - chills 3 days ago    Headache (location?): YES    Sinus Pressure:no    Conjunctivitis:  no    Ear Pain: YES- mainly L    Rhinorrhea: no     Congestion: no    Sore Throat: YES     Cough: YES-productive of yellow sputum, productive of green sputum    Wheeze: no    Decreased Appetite: YES    Nausea: YES- friday    Vomiting: no    Diarrhea:  no    Dysuria/Freq.: no    Fatigue/Achiness: YES- fatigue    Sick/Strep Exposure: YES     Therapies Tried and outcome: tylenol, Excedrin migraine  -most symptoms subsided 3 days ago besides sore throat  -The patient took Augmentin for AOM on 12/30/2017.    Chronic Cough - The patient has a history of a chronic cough with a productive white sputum. She had a bronchoscopy completed in 2015 which was normal. She has similar symptoms when she is on vacation and in a different environment without symptom relief. She has tried taking Singulair, BREO inhaler, and Zantac without symptom relief.       Problem list and histories reviewed & adjusted, as indicated.  Additional history: as documented      ROS:   Constitutional, HEENT, cardiovascular, pulmonary, GI, , musculoskeletal, neuro, skin, endocrine and psych systems are negative, except as otherwise noted.    This document serves as a record of the services and decisions personally performed and made by Rory Haque MD. It was created on his behalf by Pamela Bell, a trained medical scribe. The creation of this document is based on the provider's statements to the medical scribe.  Pamela Bell 3:28 PM 2/19/2018  OBJECTIVE:                     /78  Pulse 117  Temp 98.4  F (36.9  C) (Oral)  Ht 1.651 m (5' 5\")  Wt 66.2 kg (146 lb)  " LMP 08/08/2009  SpO2 100%  BMI 24.3 kg/m2  GENERAL: no apparent distress  EYES: Conjunctiva are not injected, no discharge.  EARS: Left TM -no erythema, no effusion,  not bulged.               Right TM -no erythema, no effusion,  not bulged.  NOSE: no discharge, no sinus tenderness  THROAT: no erythema, no exudate, no lesions  NECK: supple, no adenopathy.  CARDIAC: regular rate and rhythm, no murmur  RESP: clear, no wheezing, no rales, no rhonchi  ABD: soft, no distension, no tenderness  SKIN: No rashes    Diagnostic test results:  none   ASSESSMENT/PLAN:                     Denia was seen today for pharyngitis.    Diagnoses and all orders for this visit:    Upper respiratory tract infection, unspecified type - Rest, fluids, humidifier, and salty foods as needed. If symptoms persist, consider antibiotics.    Need for prophylactic vaccination with tetanus-diphtheria (TD)  -     TDAP VACCINE (ADACEL)  -          ADMIN VACCINE, FIRST        Symptomatic cares and fever control(if indicated) discussed.  Risks and benefits of meds discussed.    See patient instructions.    Health Maintenance Topics with due status: Overdue       Topic Date Due    WELLNESS VISIT Q1 YR 1955    DEXA Q3 YR 11/03/2014    TETANUS IMMUNIZATION (SYSTEM ASSIGNED) 03/01/2016    ADVANCE DIRECTIVE PLANNING Q5 YRS 10/04/2017    LIPID MONITORING Q5 YEARS 10/08/2017     The information in this document, created by a scribe for me, accurately reflects the services I personally performed and the decisions made by me. I have reviewed and approved this document for accuracy.      Gilberto Haque MD

## 2018-03-12 ENCOUNTER — MYC REFILL (OUTPATIENT)
Dept: FAMILY MEDICINE | Facility: CLINIC | Age: 63
End: 2018-03-12

## 2018-03-12 DIAGNOSIS — R05.3 CHRONIC COUGH: ICD-10-CM

## 2018-03-12 NOTE — TELEPHONE ENCOUNTER
Message from [employer]:  Original authorizing provider: BAR Carrillo would like a refill of the following medications:  fluticasone-vilanterol (BREO ELLIPTA) 200-25 MCG/INH oral inhaler [Amisha Giraldo PA-C]    Preferred pharmacy: [facility] - [address]    Comment:

## 2018-03-12 NOTE — TELEPHONE ENCOUNTER
Last Written Prescription Date:  11/6/17  Last Fill Quantity: 1,  # refills: 3   Last office visit: 2/19/2018    Future Office Visit:      Requested Prescriptions   Pending Prescriptions Disp Refills     fluticasone-vilanterol (BREO ELLIPTA) 200-25 MCG/INH oral inhaler 1 Inhaler 3     Sig: Inhale 1 puff into the lungs daily    There is no refill protocol information for this order        Routing refill request to provider for review/approval because:  Drug not on the [facility] [place] protocol     Alicia Villa RN  [employer]

## 2018-04-17 ENCOUNTER — MYC REFILL (OUTPATIENT)
Dept: FAMILY MEDICINE | Facility: CLINIC | Age: 63
End: 2018-04-17

## 2018-04-17 DIAGNOSIS — R05.3 CHRONIC COUGH: ICD-10-CM

## 2018-04-17 NOTE — TELEPHONE ENCOUNTER
"Requested Prescriptions   Pending Prescriptions Disp Refills     fluticasone-vilanterol (BREO ELLIPTA) 200-25 MCG/INH oral inhaler 1 Inhaler 0      Last Written Prescription Date:  03/13/2018  Last Fill Quantity: 1 inhaler ,  # refills: 0   Last office visit: 2/19/2018  Sig: Inhale 1 puff into the lungs daily    Inhaled Steroids Protocol Passed    4/17/2018  7:33 AM       Passed - Patient is age 12 or older       Passed - Recent (12 mo) or future (30 days) visit within the authorizing provider's specialty    Patient had office visit in the last 12 months or has a visit in the next 30 days with authorizing provider or within the authorizing provider's specialty.  See \"Patient Info\" tab in inbasket, or \"Choose Columns\" in Meds & Orders section of the refill encounter.            No flowsheet data found.          Message from [employer]:  Original authorizing provider: BAR Carrillo would like a refill of the following medications:  fluticasone-vilanterol (BREO ELLIPTA) 200-25 MCG/INH oral inhaler [Amisha Giraldo PA-C]    Preferred pharmacy: [facility] - [address]    Comment:    "

## 2018-05-21 ENCOUNTER — MYC REFILL (OUTPATIENT)
Dept: FAMILY MEDICINE | Facility: CLINIC | Age: 63
End: 2018-05-21

## 2018-05-21 DIAGNOSIS — R05.3 CHRONIC COUGH: ICD-10-CM

## 2018-05-21 NOTE — TELEPHONE ENCOUNTER
"Requested Prescriptions   Pending Prescriptions Disp Refills     fluticasone-vilanterol (BREO ELLIPTA) 200-25 MCG/INH oral inhaler 1 Inhaler 3      Last Written Prescription Date:  04/18/2018  Last Fill Quantity: 1 inhaler ,  # refills: 3   Last office visit: 2/19/2018    Sig: Inhale 1 puff into the lungs daily    Inhaled Steroids Protocol Passed    5/21/2018 10:31 AM       Passed - Patient is age 12 or older       Passed - Recent (12 mo) or future (30 days) visit within the authorizing provider's specialty    Patient had office visit in the last 12 months or has a visit in the next 30 days with authorizing provider or within the authorizing provider's specialty.  See \"Patient Info\" tab in inbasket, or \"Choose Columns\" in Meds & Orders section of the refill encounter.                  Message from [employer]:  Original authorizing provider: BAR Carrillo would like a refill of the following medications:  fluticasone-vilanterol (BREO ELLIPTA) 200-25 MCG/INH oral inhaler [Amisha Giraldo PA-C]    Preferred pharmacy: [facility] [place] [place] - [address] [place] [address]    Comment:    "

## 2018-05-24 NOTE — TELEPHONE ENCOUNTER
Prescription approved per [facility] Refill Protocol.  Radha Segovia RN  [facility]

## 2018-06-13 ENCOUNTER — OFFICE VISIT (OUTPATIENT)
Dept: FAMILY MEDICINE | Facility: CLINIC | Age: 63
End: 2018-06-13
Payer: COMMERCIAL

## 2018-06-13 VITALS
BODY MASS INDEX: 23.66 KG/M2 | DIASTOLIC BLOOD PRESSURE: 80 MMHG | TEMPERATURE: 98.3 F | OXYGEN SATURATION: 99 % | HEART RATE: 99 BPM | WEIGHT: 142 LBS | SYSTOLIC BLOOD PRESSURE: 116 MMHG | HEIGHT: 65 IN

## 2018-06-13 DIAGNOSIS — R63.4 LOSS OF WEIGHT: Primary | ICD-10-CM

## 2018-06-13 DIAGNOSIS — Z78.0 ASYMPTOMATIC POSTMENOPAUSAL STATUS: ICD-10-CM

## 2018-06-13 DIAGNOSIS — K21.00 GASTROESOPHAGEAL REFLUX DISEASE WITH ESOPHAGITIS: ICD-10-CM

## 2018-06-13 DIAGNOSIS — R68.81 EARLY SATIETY: ICD-10-CM

## 2018-06-13 LAB
BASOPHILS # BLD AUTO: 0 10E9/L (ref 0–0.2)
BASOPHILS NFR BLD AUTO: 0.2 %
DIFFERENTIAL METHOD BLD: NORMAL
EOSINOPHIL # BLD AUTO: 0.1 10E9/L (ref 0–0.7)
EOSINOPHIL NFR BLD AUTO: 1.1 %
ERYTHROCYTE [DISTWIDTH] IN BLOOD BY AUTOMATED COUNT: 12.9 % (ref 10–15)
HCT VFR BLD AUTO: 44.4 % (ref 35–47)
HGB BLD-MCNC: 15.1 G/DL (ref 11.7–15.7)
LYMPHOCYTES # BLD AUTO: 1.1 10E9/L (ref 0.8–5.3)
LYMPHOCYTES NFR BLD AUTO: 21.7 %
MCH RBC QN AUTO: 30.4 PG (ref 26.5–33)
MCHC RBC AUTO-ENTMCNC: 34 G/DL (ref 31.5–36.5)
MCV RBC AUTO: 90 FL (ref 78–100)
MONOCYTES # BLD AUTO: 0.3 10E9/L (ref 0–1.3)
MONOCYTES NFR BLD AUTO: 5.9 %
NEUTROPHILS # BLD AUTO: 3.7 10E9/L (ref 1.6–8.3)
NEUTROPHILS NFR BLD AUTO: 71.1 %
PLATELET # BLD AUTO: 259 10E9/L (ref 150–450)
RBC # BLD AUTO: 4.96 10E12/L (ref 3.8–5.2)
WBC # BLD AUTO: 5.3 10E9/L (ref 4–11)

## 2018-06-13 PROCEDURE — 84443 ASSAY THYROID STIM HORMONE: CPT | Performed by: PHYSICIAN ASSISTANT

## 2018-06-13 PROCEDURE — 80076 HEPATIC FUNCTION PANEL: CPT | Performed by: PHYSICIAN ASSISTANT

## 2018-06-13 PROCEDURE — 99214 OFFICE O/P EST MOD 30 MIN: CPT | Performed by: PHYSICIAN ASSISTANT

## 2018-06-13 PROCEDURE — 85025 COMPLETE CBC W/AUTO DIFF WBC: CPT | Performed by: PHYSICIAN ASSISTANT

## 2018-06-13 PROCEDURE — 36415 COLL VENOUS BLD VENIPUNCTURE: CPT | Performed by: PHYSICIAN ASSISTANT

## 2018-06-13 NOTE — PROGRESS NOTES
"  SUBJECTIVE:                                                    Denia Dailey is a 63 year old female who presents to clinic today for the following health issues:      Concern - Weight loss  Onset: August 2017    Description:   Lost 10lbs from 08/2017 - 12/2017 and then another 6lbs to current. Not changing anything since 08/2017.     Intensity: moderate    Progression of Symptoms:  same    Accompanying Signs & Symptoms: Had same symptoms when came in 08/2017  Pressure/fullness in stomach, wakes with a burning feeling soemtimes  EGD 2 years ago - had some gastritis  Nauseated when starts eating - has to force self  Clothe feel like getting looser and skin is getting wrinklier    Previous history of similar problem:   Had H-pylori 12/2015, c-diff 04/2016    Precipitating factors:     Worsened by: eating and feels queezy after taking Zantac with water. Will drink coffee and feels better.     Alleviating factors:  Improved by: nothing    Therapies Tried and outcome: Prevacid when had c-diff, will never take again.    Patient reports that in the last year she has lost about 16 lbs.  She says when she started to loose the weight, her  was dieting and she was changing how she cooks, but as the weight loss has continued, she does not think that the cooking is the reason.  She is now back to the regular routine of cooking and eating out and she is still loosing weight.    She says that she has noticed that she is getting more full easily.  She says that she cannot eat as much as she used to in one sitting.  She says that she wakes up and starts out hungry, but as she starts to eat it is like \"saw dust.\"  She is eating regular meals, three times a day.    She denies diarrhea and constipation.  She has not had any stool changes.  She denies abdominal pain and cramping.  She denies nausea and vomiting.        Wt Readings from Last 4 Encounters:   06/13/18 142 lb (64.4 kg)   02/19/18 146 lb (66.2 kg)   12/30/17 148 " "lb (67.1 kg)   08/24/17 158 lb 6.4 oz (71.8 kg)     ULTRASOUND ABDOMEN COMPLETE January 8, 2018 10:31 AM      HISTORY: Epigastric pain and early satiety. Unintentional weight loss.     COMPARISON: None.     FINDINGS:    Gallbladder: Normal with no cholelithiasis, wall thickening or focal  tenderness.       Bile ducts: CHD is normal diameter. No intrahepatic biliary  dilatation.     Liver: Normal.      Pancreas: Partially obscured but grossly unremarkable.      Spleen: Normal.      Right kidney: No acute abnormality. 9.9 cm in length. Cortical  thickness is 1.6 cm.     Left kidney: No acute abnormality. 11.7 cm in length. Cortical  thickness is 1.8 cm.     Aorta and IVC: Not specifically assessed.          IMPRESSION: No acute abnormality. Incidental note of a longer left  kidney than the right.     MAGALY PEARSON MD    Problem list and histories reviewed & adjusted, as indicated.  Additional history: as documented      ROS:  Constitutional, HEENT, cardiovascular, pulmonary, GI, , musculoskeletal, neuro, skin, endocrine and psych systems are negative, except as otherwise noted.    OBJECTIVE:                                                    /80 (BP Location: Right arm, Patient Position: Chair, Cuff Size: Adult Regular)  Pulse 99  Temp 98.3  F (36.8  C) (Oral)  Ht 5' 5\" (1.651 m)  Wt 142 lb (64.4 kg)  LMP 08/08/2009  SpO2 99%  BMI 23.63 kg/m2  Body mass index is 23.63 kg/(m^2).  GENERAL: healthy, alert and no distress  EYES: Eyes grossly normal to inspection, PERRL and conjunctivae and sclerae normal  NECK: no adenopathy, no asymmetry, masses, or scars and thyroid normal to palpation  RESP: lungs clear to auscultation - no rales, rhonchi or wheezes  CV: regular rate and rhythm, normal S1 S2, no S3 or S4, no murmur, click or rub, no peripheral edema and peripheral pulses strong  ABDOMEN: soft, nontender, no hepatosplenomegaly, no masses and bowel sounds normal  MS: no gross musculoskeletal defects noted, " no edema  NEURO: Normal strength and tone, mentation intact and speech normal  PSYCH: mentation appears normal, affect normal/bright    Diagnostic Test Results:  Labs - pending     ASSESSMENT/PLAN:                                                      Denia was seen today for weight loss.    Diagnoses and all orders for this visit:    Loss of weight  -     TSH with free T4 reflex  -     Hepatic panel (Albumin, ALT, AST, Bili, Alk Phos, TP)  -     CBC with platelets differential  -     H Pylori antigen, stool; Future  -     GASTROENTEROLOGY ADULT REF CONSULT ONLY    Early satiety  -     TSH with free T4 reflex  -     Hepatic panel (Albumin, ALT, AST, Bili, Alk Phos, TP)  -     CBC with platelets differential  -     H Pylori antigen, stool; Future  -     GASTROENTEROLOGY ADULT REF CONSULT ONLY    Gastroesophageal reflux disease with esophagitis  -     H Pylori antigen, stool; Future  -     GASTROENTEROLOGY ADULT REF CONSULT ONLY    Asymptomatic postmenopausal status    Other orders  -     Cancel: DEXA HIP/PELVIS/SPINE - Future; Future      - Exam today is unremarkable.  Patient denies any abdominal pain or discomfort.    - Labs ordered and pending draw today.   - Patient also given a referral for GI to discuss symptoms.  She should make this appointment as soon as she is able.   - Patient declined the DEXA scan today.     - Patient should followup in clinic as needed.     -- I have discussed the patient's diagnosis, and my plan of treatment with the patient and/or family. Patient is aware to followup if symptoms do not improve.  Patient has been advised to be seen sooner or seek more immediate care if symptoms change or worsen.  Patient agrees with and understands the plan today.     See Patient Instructions        Zaina Osborne PA-C    [facility]

## 2018-06-13 NOTE — MR AVS SNAPSHOT
After Visit Summary   6/13/2018    Denia Dailey    MRN: [id number]           Patient Information     Date Of Birth          1955        Visit Information        Provider Department      6/13/2018 3:00 PM Zaina Osborne PA-C [facility]        Today's Diagnoses     Loss of weight    -  1    Early satiety        Asymptomatic postmenopausal status        Gastroesophageal reflux disease with esophagitis        Colitis due to Clostridium difficile           Follow-ups after your visit        Additional Services     GASTROENTEROLOGY ADULT REF CONSULT ONLY       Preferred Location: [facility] [phone]      Please be aware that coverage of these services is subject to the terms and limitations of your health insurance plan.  Call member services at your health plan with any benefit or coverage questions.  Any procedures must be performed at a [facility] facility OR coordinated by your clinic's referral office.    Please bring the following with you to your appointment:    (1) Any X-Rays, CTs or MRIs which have been performed.  Contact the facility where they were done to arrange for  prior to your scheduled appointment.    (2) List of current medications   (3) This referral request   (4) Any documents/labs given to you for this referral                  Your next 10 appointments already scheduled     Jul 30, 2018  1:00 PM CDT   MA SCREENING BILATERAL W/ LANI with [id number]   [facility]    [address], [address]  [facility] [address] [phone]           Three-dimensional (3D) mammograms are available at [facility] locations in [place], [place], [place], [facility], [place], [place], and [place]. -Health locations include [facility] in [place]. Benefits of 3D mammograms include: - Improved rate of cancer detection - Decreases your chance of having to go back for more tests,  "which means fewer: - \"False-positive\" results (This means that there is an abnormal area but it isn't cancer.) - Invasive testing procedures, such as a biopsy or surgery - Can provide clearer images of the breast if you have dense breast tissue. 3D mammography is an optional exam that anyone can have with a 2D mammogram. It doesn't replace or take the place of a 2D mammogram. 2D mammograms remain an effective screening test for all women.  Not all insurance companies cover the cost of a 3D mammogram. Check with your insurance.              Future tests that were ordered for you today     Open Future Orders        Priority Expected Expires Ordered    H Pylori antigen, stool Routine  7/13/2018 6/13/2018            Who to contact     If you have questions or need follow up information about today's clinic visit or your schedule please contact [facility] directly at [phone].  Normal or non-critical lab and imaging results will be communicated to you by [employer], letter or phone within 4 business days after the clinic has received the results. If you do not hear from us within 7 days, please contact the clinic through [employer] or phone. If you have a critical or abnormal lab result, we will notify you by phone as soon as possible.  Submit refill requests through [employer] or call your pharmacy and they will forward the refill request to us. Please allow 3 business days for your refill to be completed.          Additional Information About Your Visit        ZeeVeehart Information     [employer] gives you secure access to your electronic health record. If you see a primary care provider, you can also send messages to your care team and make appointments. If you have questions, please call your primary care clinic.  If you do not have a primary care provider, please call [phone] and they will assist you.        Care EveryWhere ID     This is your Care EveryWhere ID. This could be used by other organizations " "to access your [facility] medical records  [id number]        Your Vitals Were     Pulse Temperature Height Last Period Pulse Oximetry BMI (Body Mass Index)    99 98.3  F (36.8  C) (Oral) 5' 5\" (1.651 m) 08/08/2009 99% 23.63 kg/m2       Blood Pressure from Last 3 Encounters:   06/13/18 116/80   02/19/18 124/78   12/30/17 126/74    Weight from Last 3 Encounters:   06/13/18 142 lb (64.4 kg)   02/19/18 146 lb (66.2 kg)   12/30/17 148 lb (67.1 kg)              We Performed the Following     CBC with platelets differential     GASTROENTEROLOGY ADULT REF CONSULT ONLY     Hepatic panel (Albumin, ALT, AST, Bili, Alk Phos, TP)     TSH with free T4 reflex        Primary Care Provider Office Phone # Fax #    [facility] [place] [phone]       [address] [facility] [address]        Equal Access to Services     QUE [facility] Hospital[facility]ON[facility] : Hadii aad ku hadasho Soomaali, waaxda luqadaha, qaybta kaalmada adeegyada, waxay idiin hayashok brady . So [facility] [phone].    ATENCIÓN: Si habla español, tiene a parikh disposición servicios gratuitos de asistencia lingüística. [facility] [phone].    We comply with applicable federal civil rights laws and [place] laws. We do not discriminate on the basis of race, color, national origin, age, disability, sex, sexual orientation, or gender identity.            Thank you!     Thank you for choosing [facility]  for your care. Our goal is always to provide you with excellent care. Hearing back from our patients is one way we can continue to improve our services. Please take a few minutes to complete the written survey that you may receive in the mail after your visit with us. Thank you!             Your Updated Medication List - Protect others around you: Learn how to safely use, store and throw away your medicines at www.disposemymeds.org.          This list is accurate as of 6/13/18  3:49 PM.  Always use your most recent med list.                   Brand " Name Dispense Instructions for use Diagnosis    calcium + D 600-200 MG-UNIT Tabs   Generic drug:  calcium carbonate-vitamin D      TWICE A DAY        fluticasone-vilanterol 200-25 MCG/INH oral inhaler    BREO ELLIPTA    1 Inhaler    Inhale 1 puff into the lungs daily    Chronic cough       magnesium 250 MG tablet     100 tablet    Take 1 tablet by mouth daily.        ranitidine 150 MG tablet    ZANTAC    60 tablet    Take 1 tablet (150 mg) by mouth 2 times daily    Gastroesophageal reflux disease with esophagitis       vitamin D 2000 units tablet     100 tablet    Take 2,000 Units by mouth daily

## 2018-06-14 DIAGNOSIS — R63.4 LOSS OF WEIGHT: ICD-10-CM

## 2018-06-14 DIAGNOSIS — K21.00 GASTROESOPHAGEAL REFLUX DISEASE WITH ESOPHAGITIS: ICD-10-CM

## 2018-06-14 DIAGNOSIS — R68.81 EARLY SATIETY: ICD-10-CM

## 2018-06-14 LAB
ALBUMIN SERPL-MCNC: 4.4 G/DL (ref 3.4–5)
ALP SERPL-CCNC: 100 U/L (ref 40–150)
ALT SERPL W P-5'-P-CCNC: 31 U/L (ref 0–50)
AST SERPL W P-5'-P-CCNC: 25 U/L (ref 0–45)
BILIRUB DIRECT SERPL-MCNC: 0.1 MG/DL (ref 0–0.2)
BILIRUB SERPL-MCNC: 0.5 MG/DL (ref 0.2–1.3)
PROT SERPL-MCNC: 7.9 G/DL (ref 6.8–8.8)
TSH SERPL DL<=0.005 MIU/L-ACNC: 0.74 MU/L (ref 0.4–4)

## 2018-06-14 PROCEDURE — 87338 HPYLORI STOOL AG IA: CPT | Performed by: PHYSICIAN ASSISTANT

## 2018-06-15 LAB
H PYLORI AG STL QL IA: NORMAL
SPECIMEN SOURCE: NORMAL

## 2018-06-15 NOTE — PROGRESS NOTES
Maximiliano Loza ,    The results from your recent lab work are within normal limits.    - H Pylori is negative.        Thank you for choosing [facility] for your health care needs,      Zaina Osborne PA-C

## 2018-06-15 NOTE — PROGRESS NOTES
Maximiliano Loza ,    The results from your recent lab work are within normal limits.    -All of your labs are normal.      Thank you for choosing [facility] for your health care needs,      Zaina Osborne PA-C

## 2018-06-29 ENCOUNTER — TRANSFERRED RECORDS (OUTPATIENT)
Dept: HEALTH INFORMATION MANAGEMENT | Facility: CLINIC | Age: 63
End: 2018-06-29

## 2018-06-29 ENCOUNTER — HOSPITAL ENCOUNTER (OUTPATIENT)
Dept: CT IMAGING | Facility: CLINIC | Age: 63
Discharge: HOME | End: 2018-06-29
Attending: PHYSICIAN ASSISTANT | Admitting: PHYSICIAN ASSISTANT
Payer: COMMERCIAL

## 2018-06-29 DIAGNOSIS — R63.4 WEIGHT LOSS: ICD-10-CM

## 2018-06-29 PROCEDURE — 25000128 H RX IP 250 OP 636: Performed by: RADIOLOGY

## 2018-06-29 PROCEDURE — 74177 CT ABD & PELVIS W/CONTRAST: CPT

## 2018-06-29 RX ORDER — IOPAMIDOL 755 MG/ML
500 INJECTION, SOLUTION INTRAVASCULAR ONCE
Status: COMPLETED | OUTPATIENT
Start: 2018-06-29 | End: 2018-06-29

## 2018-06-29 RX ADMIN — SODIUM CHLORIDE 48 ML: 900 INJECTION, SOLUTION INTRAVENOUS at 09:05

## 2018-06-29 RX ADMIN — IOPAMIDOL 71 ML: 755 INJECTION, SOLUTION INTRAVENOUS at 09:05

## 2018-07-11 ENCOUNTER — TRANSFERRED RECORDS (OUTPATIENT)
Dept: HEALTH INFORMATION MANAGEMENT | Facility: CLINIC | Age: 63
End: 2018-07-11

## 2018-07-13 ENCOUNTER — HOSPITAL ENCOUNTER (OUTPATIENT)
Dept: GENERAL RADIOLOGY | Facility: CLINIC | Age: 63
Discharge: HOME | End: 2018-07-13
Attending: PHYSICIAN ASSISTANT | Admitting: PHYSICIAN ASSISTANT
Payer: COMMERCIAL

## 2018-07-13 ENCOUNTER — HOSPITAL ENCOUNTER (OUTPATIENT)
Dept: SPEECH THERAPY | Facility: CLINIC | Age: 63
Setting detail: THERAPIES SERIES
End: 2018-07-13
Attending: PHYSICIAN ASSISTANT
Payer: COMMERCIAL

## 2018-07-13 DIAGNOSIS — R05.9 COUGH: ICD-10-CM

## 2018-07-13 DIAGNOSIS — R63.4 WEIGHT LOSS: ICD-10-CM

## 2018-07-13 PROCEDURE — 74230 X-RAY XM SWLNG FUNCJ C+: CPT

## 2018-07-13 PROCEDURE — 40000211 ZZHC STATISTIC SLP  DEPARTMENT VISIT

## 2018-07-13 PROCEDURE — 25500045 ZZH RX 255: Performed by: PHYSICIAN ASSISTANT

## 2018-07-13 PROCEDURE — 92611 MOTION FLUOROSCOPY/SWALLOW: CPT | Mod: GN

## 2018-07-13 RX ADMIN — ANTACID/ANTIFLATULENT 4 G: 380; 550; 10; 10 GRANULE, EFFERVESCENT ORAL at 11:40

## 2018-07-13 NOTE — PROGRESS NOTES
[facility] OP video swallow study  07/13/18 1100       Present No   General Information   Type Of Visit Initial   Start Of Care Date 07/13/18   Referring Physician Dr. Ramirez   Orders Evaluate And Treat   Medical Diagnosis dysphagia   Onset Of Illness/injury Or Date Of Surgery 06/13/18  (per MD order date)   Precautions/limitations No Known Precautions/limitations   Hearing WFL   Pertinent History of Current Problem/OT: Additional Occupational Profile Info Pt is a 63 year old female with past medical hx of GERD and chronic cough. Per pt, pt initially visited GI d/t unintentional weight loss and chronic cough. Pt states she has noticed increased coughing with PO, especially on salty foods. Pt has had chronic cough for years and has tried PPI and other interventions to manage GERD with little success. Pt with EGD completed a few years ago which demonstrated gastritis. Pt currently eats regular textures and thin liquids. Pt denies hx of frequent PNA. Video swallow study completed per MD orders to further assess oropharyngeal swallow function.    Respiratory Status Room air   Prior Level Of Function Swallowing   Prior Level Of Function Comment Pt currently eats regular textures and thin liquids   Patient Role/employment History Employed   Living Environment [place]   Patient/family Goals Pt would like to know reason for her chronic cough   Pain Assessment   Pain Reported No   Fall Risk Screen   Fall screen comments Please see radiology report for further details   Clinical Swallow Evaluation   Oral Musculature generally intact   Structural Abnormalities none present   Dentition present and adequate   Mucosal Quality adequate   Mandibular Strength and Mobility intact   Oral Labial Strength and Mobility WFL   Lingual Strength and Mobility WFL   Velar Elevation intact   Buccal Strength and Mobility intact   Laryngeal Function Cough;Throat clear;Swallow;Voicing initiated   VFSS Evaluation   VFSS  Additional Documentation Yes   VFSS Eval: Radiology   Radiologist Dr. Sharp   Views Taken left lateral   Physical Location of Procedure [facility]   VFSS Eval: Thin Liquid Texture Trial   Mode of Presentation, Thin Liquid cup;self-fed   Order of Presentation 1, 2, 3, 6   Preparatory Phase WFL   Oral Phase, Thin Liquid WFL   Pharyngeal Phase, Thin Liquid WFL   Rosenbek's Penetration Aspiration Scale: Thin Liquid Trial Results 1 - no aspiration, contrast does not enter airway   Diagnostic Statement No aspiration or penetration. No pharyngeal residuals    VFSS Eval: Puree Solid Texture Trial   Mode of Presentation, Puree spoon;self-fed   Order of Presentation 4   Preparatory Phase WFL   Oral Phase, Puree WFL   Pharyngeal Phase, Puree WFL   Rosenbek's Penetration Aspiration Scale: Puree Food Trial Results 1 - no aspiration, contrast does not enter airway   Diagnostic Statement No aspiration or penetration. No pharyngeal residuals   VFSS Eval: Solid Food Texture Trial   Mode of Presentation, Solid spoon;self-fed   Order of Presentation 5   Preparatory Phase WFL   Oral Phase, Solid WFL   Pharyngeal Phase, Solid WFL   Rosenbek's Penetration Aspiration Scale: Solid Food Trial Results 1 - no aspiration, contrast does not enter airway   Diagnostic Statement No aspiration or penetration. No pharyngeal residuals   FEES Evaluation   Additional Documentation No   Swallow Compensations   Swallow Compensations No compensations were used   Results No difficulties noted   Educational Assessment   Barriers to Learning No barriers   Preferred Learning Style Listening;Demonstration;Pictures/video   Esophageal Phase of Swallow   Patient reports or presents with symptoms of esophageal dysphagia Yes   Esophageal sweep performed during today s vidofluoroscopic exam  Yes;Please refer to radiologist's report for details   Esophageal comments Per radiologist, pt with evidence of reflux. Educated pt on reflux precautions   Swallow Eval: Clinical  Impressions   Skilled Criteria for Therapy Intervention No problems identified which require skilled intervention   Dysphagia Outcome Severity Scale (YINKA) Level 7 - YINKA   Treatment Diagnosis Oropharyngeal swallow WNL   Diet texture recommendations Regular diet;Thin liquids   Recommended Feeding/Eating Techniques alternate between small bites and sips of food/liquid;check mouth frequently for oral residue/pocketing;hard swallow w/ each bite or sip;maintain upright posture during/after eating for 30 mins;small sips/bites  (reflux precautions)   Demonstrates Need for Referral to Another Service other (see comments)  (GI')   Anticipated Discharge Disposition home   Risks and Benefits of Treatment have been explained. Yes   Patient, family and/or staff in agreement with Plan of Care Yes   Clinical Impression Comments SLP: Video swallow study completed per MD orders. Pts oropharyngeal swallow is WNL. Pts swallow function characterized by adequate time in oral phase and timely pharyngeal swallow response. No aspiration or penetration. No evidence of significant pharyngeal residuals remaining following PO trials. Recommend continue regular textures and thin liquids. Pt should follow strict reflux precautions and f/u with GI to further manage pts s/sx of GERD. Pt demonstrated good understanding of recommendations and rationale; no further ST needs indicated.    Swallow Goals   SLP Swallow Goals 1   Swallow Goal 1   Goal Identifier LTG   Goal Description Pt will verbalize understanding and agreement with VFSS results and recommendations   Target Date 07/13/18   Date Met 07/13/18   Total Session Time   Total Session Time 30   Total Evaluation Time 30

## 2018-07-29 ENCOUNTER — MYC REFILL (OUTPATIENT)
Dept: FAMILY MEDICINE | Facility: CLINIC | Age: 63
End: 2018-07-29

## 2018-07-29 DIAGNOSIS — R05.3 CHRONIC COUGH: ICD-10-CM

## 2018-07-30 ENCOUNTER — HOSPITAL ENCOUNTER (OUTPATIENT)
Dept: MAMMOGRAPHY | Facility: CLINIC | Age: 63
Discharge: HOME | End: 2018-07-30
Attending: OBSTETRICS & GYNECOLOGY | Admitting: OBSTETRICS & GYNECOLOGY
Payer: COMMERCIAL

## 2018-07-30 DIAGNOSIS — Z12.31 VISIT FOR SCREENING MAMMOGRAM: ICD-10-CM

## 2018-07-30 PROCEDURE — 77063 BREAST TOMOSYNTHESIS BI: CPT

## 2018-07-30 NOTE — TELEPHONE ENCOUNTER
"Requested Prescriptions   Pending Prescriptions Disp Refills     fluticasone-vilanterol (BREO ELLIPTA) 200-25 MCG/INH oral inhaler  Last Written Prescription Date:  5/24/2018  Last Fill Quantity: 1 Inhaler,  # refills: 3   Last Office Visit: 6/13/2018   Future Office Visit:      1 Inhaler 3     Sig: Inhale 1 puff into the lungs daily    Inhaled Steroids Protocol Passed    7/30/2018  7:15 AM       Passed - Patient is age 12 or older       Passed - Recent (12 mo) or future (30 days) visit within the authorizing provider's specialty    Patient had office visit in the last 12 months or has a visit in the next 30 days with authorizing provider or within the authorizing provider's specialty.  See \"Patient Info\" tab in inbasket, or \"Choose Columns\" in Meds & Orders section of the refill encounter.              "

## 2018-07-30 NOTE — TELEPHONE ENCOUNTER
Message from [employer]:  Original authorizing provider: BAR Carrillo would like a refill of the following medications:  fluticasone-vilanterol (BREO ELLIPTA) 200-25 MCG/INH oral inhaler [Amisha Giraldo PA-C]    Preferred pharmacy: [facility] - [address]    Comment:

## 2018-07-30 NOTE — TELEPHONE ENCOUNTER
Prescription approved per [facility].  Radha Segovia RN  [facility]

## 2018-08-29 ENCOUNTER — MYC REFILL (OUTPATIENT)
Dept: FAMILY MEDICINE | Facility: CLINIC | Age: 63
End: 2018-08-29

## 2018-08-29 DIAGNOSIS — R05.3 CHRONIC COUGH: ICD-10-CM

## 2018-08-29 NOTE — TELEPHONE ENCOUNTER
Message from Virtual Call Center[employer]:  Original authorizing provider: BAR Carrillo would like a refill of the following medications:  fluticasone-vilanterol (BREO ELLIPTA) 200-25 MCG/INH oral inhaler [Amisha Giraldo PA-C]    Preferred pharmacy: [facility] - [address]    Comment:

## 2018-08-30 NOTE — TELEPHONE ENCOUNTER
"Requested Prescriptions   Pending Prescriptions Disp Refills     fluticasone-vilanterol (BREO ELLIPTA) 200-25 MCG/INH inhaler 1 Inhaler 3     Sig: Inhale 1 puff into the lungs daily    Last Refill:   Medication Detail         Disp Refills Start End LUIS     fluticasone-vilanterol (BREO ELLIPTA) 200-25 MCG/INH oral inhaler 1 Inhaler 3 7/30/2018  No     Sig - Route: Inhale 1 puff into the lungs daily - Inhalation     Inhaled Steroids Protocol Passed    8/29/2018  7:19 AM       Passed - Patient is age 12 or older       Passed - Recent (12 mo) or future (30 days) visit within the authorizing provider's specialty    Patient had office visit in the last 12 months or has a visit in the next 30 days with authorizing provider or within the authorizing provider's specialty.  See \"Patient Info\" tab in inbasket, or \"Choose Columns\" in Meds & Orders section of the refill encounter.      LOV: 06/13/2018          1 inhaler x 3 refills sent on 07/30/2018 - should have refills on file  Per pharmacy -  Patient does have refills on file    Marlene Sunshine RN  [facility]    "

## 2018-09-19 ENCOUNTER — OFFICE VISIT (OUTPATIENT)
Dept: FAMILY MEDICINE | Facility: CLINIC | Age: 63
End: 2018-09-19
Payer: COMMERCIAL

## 2018-09-19 VITALS
SYSTOLIC BLOOD PRESSURE: 107 MMHG | BODY MASS INDEX: 22.82 KG/M2 | TEMPERATURE: 98.7 F | HEIGHT: 65 IN | WEIGHT: 137 LBS | HEART RATE: 101 BPM | OXYGEN SATURATION: 99 % | DIASTOLIC BLOOD PRESSURE: 80 MMHG

## 2018-09-19 DIAGNOSIS — R19.7 DIARRHEA, UNSPECIFIED TYPE: ICD-10-CM

## 2018-09-19 DIAGNOSIS — R55 SYNCOPE, UNSPECIFIED SYNCOPE TYPE: ICD-10-CM

## 2018-09-19 DIAGNOSIS — R07.0 THROAT PAIN: Primary | ICD-10-CM

## 2018-09-19 LAB
DEPRECATED S PYO AG THROAT QL EIA: NORMAL
SPECIMEN SOURCE: NORMAL

## 2018-09-19 PROCEDURE — 93000 ELECTROCARDIOGRAM COMPLETE: CPT | Performed by: PHYSICIAN ASSISTANT

## 2018-09-19 PROCEDURE — 87880 STREP A ASSAY W/OPTIC: CPT | Performed by: PHYSICIAN ASSISTANT

## 2018-09-19 PROCEDURE — 99214 OFFICE O/P EST MOD 30 MIN: CPT | Performed by: PHYSICIAN ASSISTANT

## 2018-09-19 PROCEDURE — 87081 CULTURE SCREEN ONLY: CPT | Performed by: PHYSICIAN ASSISTANT

## 2018-09-19 NOTE — PROGRESS NOTES
SUBJECTIVE:                                                    Denia Dailey is a 63 year old female who presents to clinic today for the following health issues:      Diarrhea  Onset: x3 days -- Diarrhea started 2 days go    Description:   Consistency of stool: watery, yellow and explosive  Blood in stool: no   Number of loose stools in past 24 hours: 20    Progression of Symptoms:  same    Accompanying Signs & Symptoms:  Fever: YES- first night - 101.2 - 98.8 this morning curr 98.7  Nausea or vomiting; YES- nausea - 30 minutes after eating - has no appetite  Abdominal pain: YES- Right side under rib cage off and on - ache  Episodes of constipation: no   Weight loss: YES- 7 pounds since this started    History:   Ill contacts: no   Recent use of antibiotics: no    Recent travels: no          Recent medication-new or changes(Rx or OTC): no     Precipitating factors:   Ate at [employer] - Mom was with and also got sick and [place] --     Alleviating factors:   sleep    Therapies Tried and outcome:  nothing    Patient reports that she fainted twice on Mon.  She says that she got up to call into work for being ill and she fell to the floor.  She was sweaty and felt the need for diarrhea at the time of fainting.  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  She denies chest pain, heart palpitations, no pain in jaw or left arm or back.      Patient states that about two weeks ago she had a sore throat, but that has since improved.      Wt Readings from Last 4 Encounters:   09/19/18 137 lb (62.1 kg)   06/13/18 142 lb (64.4 kg)   02/19/18 146 lb (66.2 kg)   12/30/17 148 lb (67.1 kg)     Problem list and histories reviewed & adjusted, as indicated.  Additional history: as documented      ROS:  Constitutional, HEENT, cardiovascular, pulmonary, GI, , musculoskeletal, neuro, skin, endocrine and psych systems are negative, except  "as otherwise noted.    OBJECTIVE:                                                    /80 (BP Location: Left arm, Patient Position: Chair, Cuff Size: Adult Regular)  Pulse 101  Temp 98.7  F (37.1  C) (Oral)  Ht 5' 5\" (1.651 m)  Wt 137 lb (62.1 kg)  LMP 08/08/2009  SpO2 99%  Breastfeeding? No  BMI 22.8 kg/m2  Body mass index is 22.8 kg/(m^2).  GENERAL: healthy, alert and no distress  EYES: Eyes grossly normal to inspection, PERRL and conjunctivae and sclerae normal  HENT: ear canals and TM's normal, nose and mouth without ulcers or lesions  NECK: no adenopathy, no asymmetry, masses, or scars and thyroid normal to palpation  RESP: lungs clear to auscultation - no rales, rhonchi or wheezes  CV: regular rate and rhythm, normal S1 S2, no S3 or S4, no murmur, click or rub, no peripheral edema and peripheral pulses strong  ABDOMEN: soft, nontender, no hepatosplenomegaly, no masses and bowel sounds normal  MS: no gross musculoskeletal defects noted, no edema  NEURO: Normal strength and tone, mentation intact and speech normal  PSYCH: mentation appears normal, affect normal/bright    Diagnostic Test Results:  Labs - pending   Strep - negative     ASSESSMENT/PLAN:                                                      Denia was seen today for diarrhea.    Diagnoses and all orders for this visit:    Throat pain  -     Strep, Rapid Screen  -     Beta strep group A culture    Diarrhea, unspecified type  -     Clostridium difficile Toxin B PCR; Future  -     Enteric Bacteria and Virus Panel by CORINNE Stool; Future  -     Ova and Parasite Exam Routine; Future    Syncope, unspecified syncope type  -     EKG 12-lead complete w/read - Clinics    Strep ordered due to initial symptom of throat pain and now diarrhea, but strep is negative today.  Exam is unremarkable in clinic.  There is no tenderness to palpation of the entire abdomen.  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  EKG is unremarkable, but " patient has been advised to seek immediate care if she develops any symptoms of chest pain, heart palpitations, pain in jaw, arms, or back.  She should also seek immediate care if the symptoms change or worsen in any way.   Patient has been advised to push fluids and follow a clear liquid diet and progress diet as tolerated.      Followup: Return in about 3 days (around 9/22/2018) for If not improving, please be seen sooner if needed.    -- I have discussed the patient's diagnosis, and my plan of treatment with the patient and/or family. Patient is aware to followup if symptoms do not improve.  Patient has been advised to be seen sooner or seek more immediate care if symptoms change or worsen.  Patient agrees with and understands the plan today.     See Patient Instructions        Zaina Osborne PA-C    [facility] [place]

## 2018-09-19 NOTE — MR AVS SNAPSHOT
After Visit Summary   9/19/2018    Denia Dailey    MRN: [id number]           Patient Information     Date Of Birth          1955        Visit Information        Provider Department      9/19/2018 2:00 PM Zaina Osborne PA-C [facility]        Today's Diagnoses     Throat pain    -  1    Diarrhea, unspecified type        Syncope, unspecified syncope type           Follow-ups after your visit        Follow-up notes from your care team     Return in about 3 days (around 9/22/2018) for If not improving, please be seen sooner if needed.      Who to contact     If you have questions or need follow up information about today's clinic visit or your schedule please contact [facility] directly at [phone].  Normal or non-critical lab and imaging results will be communicated to you by [employer], letter or phone within 4 business days after the clinic has received the results. If you do not hear from us within 7 days, please contact the clinic through [employer] or phone. If you have a critical or abnormal lab result, we will notify you by phone as soon as possible.  Submit refill requests through [employer] or call your pharmacy and they will forward the refill request to us. Please allow 3 business days for your refill to be completed.          Additional Information About Your Visit        MyChart Information     [employer] gives you secure access to your electronic health record. If you see a primary care provider, you can also send messages to your care team and make appointments. If you have questions, please call your primary care clinic.  If you do not have a primary care provider, please call [phone] and they will assist you.        Care EveryWhere ID     This is your Care EveryWhere ID. This could be used by other organizations to access your [place] medical records  [id number]        Your Vitals Were     Pulse Temperature Height Last Period Pulse Oximetry  "Breastfeeding?    101 98.7  F (37.1  C) (Oral) 5' 5\" (1.651 m) 08/08/2009 99% No    BMI (Body Mass Index)                   22.8 kg/m2            Blood Pressure from Last 3 Encounters:   09/19/18 107/80   06/13/18 116/80   02/19/18 124/78    Weight from Last 3 Encounters:   09/19/18 137 lb (62.1 kg)   06/13/18 142 lb (64.4 kg)   02/19/18 146 lb (66.2 kg)              We Performed the Following     Beta strep group A culture     EKG 12-lead complete w/read - Clinics     Strep, Rapid Screen        Primary Care Provider Office Phone # Fax #    Pa [place] [phone]       [facility] [address]        Equal Access to Services     [facility] SAMMI : Hadii aad ku hadasho Soguicho, waaxda luqadaha, qaybta kaalmada antonette, leonard brady . So [facility] [phone].    ATENCIÓN: Si habla español, tiene a parikh disposición servicios gratuitos de asistencia lingüística. [facility] [phone].    We comply with applicable federal civil rights laws and [place] laws. We do not discriminate on the basis of race, color, national origin, age, disability, sex, sexual orientation, or gender identity.            Thank you!     Thank you for choosing [facility]  for your care. Our goal is always to provide you with excellent care. Hearing back from our patients is one way we can continue to improve our services. Please take a few minutes to complete the written survey that you may receive in the mail after your visit with us. Thank you!             Your Updated Medication List - Protect others around you: Learn how to safely use, store and throw away your medicines at www.disposemymeds.org.          This list is accurate as of 9/19/18 11:59 PM.  Always use your most recent med list.                   Brand Name Dispense Instructions for use Diagnosis    calcium + D 600-200 MG-UNIT Tabs   Generic drug:  calcium carbonate-vitamin D      TWICE A DAY        " fluticasone-vilanterol 200-25 MCG/INH inhaler    BREO ELLIPTA    1 Inhaler    Inhale 1 puff into the lungs daily    Chronic cough       magnesium 250 MG tablet     100 tablet    Take 1 tablet by mouth daily.        ranitidine 150 MG tablet    ZANTAC    60 tablet    Take 1 tablet (150 mg) by mouth 2 times daily    Gastroesophageal reflux disease with esophagitis       vitamin D 2000 units tablet     100 tablet    Take 2,000 Units by mouth daily

## 2018-09-20 DIAGNOSIS — R19.7 DIARRHEA, UNSPECIFIED TYPE: ICD-10-CM

## 2018-09-20 LAB
BACTERIA SPEC CULT: NORMAL
SPECIMEN SOURCE: NORMAL

## 2018-09-20 PROCEDURE — 87493 C DIFF AMPLIFIED PROBE: CPT | Performed by: PHYSICIAN ASSISTANT

## 2018-09-20 PROCEDURE — 87506 IADNA-DNA/RNA PROBE TQ 6-11: CPT | Performed by: PHYSICIAN ASSISTANT

## 2018-09-20 PROCEDURE — 87209 SMEAR COMPLEX STAIN: CPT | Performed by: PHYSICIAN ASSISTANT

## 2018-09-20 PROCEDURE — 87177 OVA AND PARASITES SMEARS: CPT | Performed by: PHYSICIAN ASSISTANT

## 2018-09-21 LAB
C COLI+JEJUNI+LARI FUSA STL QL NAA+PROBE: NOT DETECTED
C DIFF TOX B STL QL: NEGATIVE
EC STX1 GENE STL QL NAA+PROBE: NOT DETECTED
EC STX2 GENE STL QL NAA+PROBE: NOT DETECTED
ENTERIC PATHOGEN COMMENT: ABNORMAL
NOROV GI+II ORF1-ORF2 JNC STL QL NAA+PR: NOT DETECTED
O+P STL MICRO: NORMAL
RVA NSP5 STL QL NAA+PROBE: NOT DETECTED
SALMONELLA SP RPOD STL QL NAA+PROBE: ABNORMAL
SHIGELLA SP+EIEC IPAH STL QL NAA+PROBE: NOT DETECTED
SPECIMEN SOURCE: NORMAL
SPECIMEN SOURCE: NORMAL
V CHOL+PARA RFBL+TRKH+TNAA STL QL NAA+PR: NOT DETECTED
Y ENTERO RECN STL QL NAA+PROBE: NOT DETECTED

## 2018-09-24 NOTE — PROGRESS NOTES
Note to staff: Please call the patient to explain results.    The results from your recent stool samples show that the likely cause of your diarrhea is salmonella.  Salmonella is a common form of food borne illness and is often self limiting.  If your symptoms are not improved, please let us know and we can consider antibiotic therapy to treat this infection.    - Please seek more immediate care if symptoms are changed or worsened.        Thank you for choosing [facility] for your health care needs,      Zaina Osborne PA-C

## 2018-10-01 ENCOUNTER — MYC REFILL (OUTPATIENT)
Dept: FAMILY MEDICINE | Facility: CLINIC | Age: 63
End: 2018-10-01

## 2018-10-01 DIAGNOSIS — R05.3 CHRONIC COUGH: ICD-10-CM

## 2018-10-01 NOTE — TELEPHONE ENCOUNTER
Message from [employer]:  Original authorizing provider: BAR Carrillo would like a refill of the following medications:  fluticasone-vilanterol (BREO ELLIPTA) 200-25 MCG/INH oral inhaler [Amisha Giraldo PA-C]    Preferred pharmacy: [facility] - [address]    Comment:

## 2018-10-02 NOTE — TELEPHONE ENCOUNTER
"   fluticasone-vilanterol (BREO ELLIPTA) 200-25 MCG/INH oral inhaler 1 Inhaler 3 7/30/2018  No   Sig - Route: Inhale 1 puff into the lungs daily - Inhalation       Last office visit: 9/19/2018   Future Office Visit:                                   Passed - Patient is age 12 or older       Passed - Recent (12 mo) or future (30 days) visit within the authorizing provider's specialty    Patient had office visit in the last 12 months or has a visit in the next 30 days with authorizing provider or within the authorizing provider's specialty.  See \"Patient Info\" tab in inbasket, or \"Choose Columns\" in Meds & Orders section of the refill encounter.            Refill per RN protocol     Cristy Zepeda RN, BSN  [facility]     "

## 2018-11-07 ENCOUNTER — MYC REFILL (OUTPATIENT)
Dept: FAMILY MEDICINE | Facility: CLINIC | Age: 63
End: 2018-11-07

## 2018-11-07 DIAGNOSIS — R05.3 CHRONIC COUGH: ICD-10-CM

## 2018-11-07 NOTE — TELEPHONE ENCOUNTER
Message from [employer]:  Original authorizing provider: BAR Carrillo would like a refill of the following medications:  fluticasone-vilanterol (BREO ELLIPTA) 200-25 MCG/INH inhaler [Amisha Giraldo PA-C]    Preferred pharmacy: [facility] - [address]    Comment:

## 2018-11-08 NOTE — TELEPHONE ENCOUNTER
"Requested Prescriptions   Pending Prescriptions Disp Refills     fluticasone-vilanterol (BREO ELLIPTA) 200-25 MCG/INH inhaler 1 Inhaler 3    Last Written Prescription Date:  10/2/2018  Last Fill Quantity: 1 inhaler,  # refills: 3   Last office visit: 9/19/2018 with prescribing provider:  [place]   Future Office Visit:     Sig: Inhale 1 puff into the lungs daily    Inhaled Steroids Protocol Passed    11/7/2018 11:19 AM       Passed - Patient is age 12 or older       Passed - Recent (12 mo) or future (30 days) visit within the authorizing provider's specialty    Patient had office visit in the last 12 months or has a visit in the next 30 days with authorizing provider or within the authorizing provider's specialty.  See \"Patient Info\" tab in inbasket, or \"Choose Columns\" in Meds & Orders section of the refill encounter.              Patient has refills remaining at pharmacy.  ОЛЬГА Monet, RN  [facility]    "

## 2019-04-08 DIAGNOSIS — R05.3 CHRONIC COUGH: ICD-10-CM

## 2019-04-08 NOTE — TELEPHONE ENCOUNTER
"Requested Prescriptions   Pending Prescriptions Disp Refills     BREO ELLIPTA 200-25 MCG/INH Inhaler [Pharmacy Med Name: BREO ELLIPTA 200-25MCG/INH AEPB]  3     Sig: INHALE 1 PUFF INTO THE LUNGS DAILY       Last Refill:    Disp Refills Start End LUIS   fluticasone-vilanterol (BREO ELLIPTA) 200-25 MCG/INH inhaler 1 Inhaler 3 10/2/2018  No   Sig - Route: Inhale 1 puff into the lungs daily - Inhalation     Inhaled Steroids Protocol Passed - 4/8/2019 10:11 AM        Passed - Patient is age 12 or older        Passed - Recent (12 mo) or future (30 days) visit within the authorizing provider's specialty     Patient had office visit in the last 12 months or has a visit in the next 30 days with authorizing provider or within the authorizing provider's specialty.  See \"Patient Info\" tab in inbasket, or \"Choose Columns\" in Meds & Orders section of the refill encounter.      LOV: 09/19/2018          Passed - Medication is active on med list        Refilled per RN Protocol.     Marlene Sunshine RN  [place] [facility]    "

## 2019-06-27 ENCOUNTER — TRANSFERRED RECORDS (OUTPATIENT)
Dept: HEALTH INFORMATION MANAGEMENT | Facility: CLINIC | Age: 64
End: 2019-06-27

## 2019-07-12 ENCOUNTER — TRANSFERRED RECORDS (OUTPATIENT)
Dept: HEALTH INFORMATION MANAGEMENT | Facility: CLINIC | Age: 64
End: 2019-07-12

## 2019-07-12 LAB
HPV ABSTRACT: NORMAL
PAP-ABSTRACT: NORMAL

## 2019-07-20 ENCOUNTER — OFFICE VISIT (OUTPATIENT)
Dept: FAMILY MEDICINE | Facility: CLINIC | Age: 64
End: 2019-07-20
Payer: COMMERCIAL

## 2019-07-20 VITALS
BODY MASS INDEX: 22.16 KG/M2 | DIASTOLIC BLOOD PRESSURE: 74 MMHG | HEART RATE: 106 BPM | WEIGHT: 133 LBS | SYSTOLIC BLOOD PRESSURE: 128 MMHG | HEIGHT: 65 IN | OXYGEN SATURATION: 100 % | TEMPERATURE: 98.6 F

## 2019-07-20 DIAGNOSIS — R19.7 DIARRHEA, UNSPECIFIED TYPE: Primary | ICD-10-CM

## 2019-07-20 DIAGNOSIS — Z13.21 ENCOUNTER FOR VITAMIN DEFICIENCY SCREENING: ICD-10-CM

## 2019-07-20 LAB
BASOPHILS # BLD AUTO: 0 10E9/L (ref 0–0.2)
BASOPHILS NFR BLD AUTO: 0.2 %
DIFFERENTIAL METHOD BLD: NORMAL
EOSINOPHIL # BLD AUTO: 0.1 10E9/L (ref 0–0.7)
EOSINOPHIL NFR BLD AUTO: 2.1 %
ERYTHROCYTE [DISTWIDTH] IN BLOOD BY AUTOMATED COUNT: 12.7 % (ref 10–15)
HCT VFR BLD AUTO: 39.5 % (ref 35–47)
HGB BLD-MCNC: 14 G/DL (ref 11.7–15.7)
LYMPHOCYTES # BLD AUTO: 1.1 10E9/L (ref 0.8–5.3)
LYMPHOCYTES NFR BLD AUTO: 24 %
MCH RBC QN AUTO: 32 PG (ref 26.5–33)
MCHC RBC AUTO-ENTMCNC: 35.4 G/DL (ref 31.5–36.5)
MCV RBC AUTO: 90 FL (ref 78–100)
MONOCYTES # BLD AUTO: 0.4 10E9/L (ref 0–1.3)
MONOCYTES NFR BLD AUTO: 8.2 %
NEUTROPHILS # BLD AUTO: 3.1 10E9/L (ref 1.6–8.3)
NEUTROPHILS NFR BLD AUTO: 65.5 %
PLATELET # BLD AUTO: 214 10E9/L (ref 150–450)
RBC # BLD AUTO: 4.38 10E12/L (ref 3.8–5.2)
WBC # BLD AUTO: 4.7 10E9/L (ref 4–11)

## 2019-07-20 PROCEDURE — 85025 COMPLETE CBC W/AUTO DIFF WBC: CPT | Performed by: NURSE PRACTITIONER

## 2019-07-20 PROCEDURE — 36415 COLL VENOUS BLD VENIPUNCTURE: CPT | Performed by: NURSE PRACTITIONER

## 2019-07-20 PROCEDURE — 80053 COMPREHEN METABOLIC PANEL: CPT | Performed by: NURSE PRACTITIONER

## 2019-07-20 PROCEDURE — 99213 OFFICE O/P EST LOW 20 MIN: CPT | Performed by: NURSE PRACTITIONER

## 2019-07-20 PROCEDURE — 82306 VITAMIN D 25 HYDROXY: CPT | Performed by: NURSE PRACTITIONER

## 2019-07-20 RX ORDER — BUSPIRONE HYDROCHLORIDE 5 MG/1
TABLET ORAL
COMMUNITY
Start: 2019-07-07 | End: 2020-04-16

## 2019-07-20 ASSESSMENT — MIFFLIN-ST. JEOR: SCORE: 1159.16

## 2019-07-20 NOTE — PATIENT INSTRUCTIONS
Patient Education     Diarrhea with Uncertain Cause (Adult)    Diarrhea is when stools are loose and watery. This can be caused by:    Viral infections    Bacterial infections    Food poisoning    Parasites    Irritable bowel syndrome (IBS)    Inflammatory bowel diseases such as ulcerative colitis, Crohn's disease, and celiac disease    Food intolerance, such as to lactose, the sugar found in milk and milk products    Reaction to medicines like antibiotics, laxatives, cancer drugs, and antacids  Along with diarrhea, you may also have:    Abdominal pain and cramping    Nausea and vomiting    Loss of bowel control    Fever and chills    Bloody stools  In some cases, antibiotics may help to treat diarrhea. You may have a stool sample test. This is done to see what is causing your diarrhea, and if antibiotics will help treat it. The results of a stool sample test may take up to 2 days. The healthcare provider may not give you antibiotics until he or she has the stool test results.  Diarrhea can cause dehydration. This is the loss of too much water and other fluids from the body. When this occurs, body fluid must be replaced. This can be done with oral rehydration solutions. Oral rehydration solutions are available at drugstores and grocery stores without a prescription. Sports drinks are not the best choice if you are very dehydrated. They have too much sugar and not enough electrolytes.  Home care  Follow all instructions given by your healthcare provider. Rest at home for the next 24 hours, or until you feel better. Avoid caffeine, tobacco, and alcohol. These can make diarrhea, cramping, and pain worse.  If taking medicines:    Over-the-counter nausea and diarrhea medicines are generally OK unless you experience fever or blood stool. Check with your doctor first in those circumstances.    You may use acetaminophen or NSAID medicines like ibuprofen or naproxen to reduce pain and fever. Don t use these if you have  chronic liver or kidney disease, or ever had a stomach ulcer or gastrointestinal bleeding. Don't use NSAID medicines if you are already taking one for another condition (like arthritis) or are on daily aspirin therapy (such as for heart disease or after a stroke). Talk with your healthcare provider first.    If antibiotics were prescribed, be sure you take them until they are finished. Don t stop taking them even when you feel better. Antibiotics must be taken as a full course.  To prevent the spread of illness:    Remember that washing with soap and water and using alcohol-based  is the best way to prevent the spread of infection. Dry your hands with a single use towel (like a paper towel).    Clean the toilet after each use.    Wash your hands before eating.    Wash your hands before and after preparing food. Keep in mind that people with diarrhea or vomiting should not prepare food for others.    Wash your hands after using cutting boards, countertops, and knives that have been in contact with raw foods.    Wash and then peel fruits and vegetables.    Keep uncooked meats away from cooked and ready-to-eat foods.    Use a food thermometer when cooking. Cook poultry to at least 165 F (74 C). Cook ground meat (beef, veal, pork, lamb) to at least 160 F (71 C). Cook fresh beef, veal, lamb, and pork to at least 145 F (63 C).    Don t eat raw or undercooked eggs (poached or loyd side up), poultry, meat, or unpasteurized milk and juices.  Food and drinks  The main goal while treating vomiting or diarrhea is to prevent dehydration. This is done by taking small amounts of liquids often.    Keep in mind that liquids are more important than food right now.    Drink only small amounts of liquids at a time.    Don t force yourself to eat, especially if you are having cramping, vomiting, or diarrhea. Don t eat large amounts at a time, even if you are hungry.    If you eat, avoid fatty, greasy, spicy, or fried  foods.    Don t eat dairy foods or drink milk if you have diarrhea. These can make diarrhea worse.  During the first 24 hours you can try:    Oral rehydration solutions.  Sports drinks may be used if you are not too dehydrated and are otherwise healthy.    Soft drinks without caffeine    Ginger ale    Water (plain or flavored)    Decaf tea or coffee    Clear broth, consommé, or bouillon    Gelatin, popsicles, or frozen fruit juice bars  The second 24 hours, if you are feeling better, you can add:    Hot cereal, plain toast, bread, rolls, or crackers    Plain noodles, rice, mashed potatoes, chicken noodle soup, or rice soup    Unsweetened canned fruit (no pineapple)    Bananas  As you recover:    Limit fat intake to less than 15 grams per day. Don t eat margarine, butter, oils, mayonnaise, sauces, gravies, fried foods, peanut butter, meat, poultry, or fish.    Limit fiber. Don t eat raw or cooked vegetables, fresh fruits except bananas, or bran cereals.    Limit caffeine and chocolate.    Limit dairy.    Don t use spices or seasonings except salt.    Go back to your normal diet over time, as you feel better and your symptoms improve.    If the symptoms come back, go back to a simple diet or clear liquids.  Follow-up care  Follow up with your healthcare provider, or as advised. If a stool sample was taken or cultures were done, call the healthcare provider for the results as instructed.  Call 911  Call 911 if you have any of these symptoms:    Trouble breathing    Confusion    Extreme drowsiness or trouble walking    Loss of consciousness    Rapid heart rate    Chest pain    Stiff neck    Seizure  When to seek medical advice  Call your healthcare provider right away if any of these occur:    Abdominal pain that gets worse    Constant lower right abdominal pain    Continued vomiting and inability to keep liquids down    Diarrhea more than 5 times a day    Blood in vomit or stool    Dark urine or no urine for 8 hours,  dry mouth and tongue, tiredness, weakness, or dizziness    Drowsiness    New rash    You don t get better in 2 to 3 days    Fever of 100.4 F (38 C) or higher, or as directed by your healthcare provider  Date Last Reviewed: 6/1/2018 2000-2018 [employer]. [address] [place] [id number]. All rights reserved. This information is not intended as a substitute for professional medical care. Always follow your healthcare professional's instructions.

## 2019-07-20 NOTE — PROGRESS NOTES
Subjective     Denia Dailey is a 64 year old female who presents to clinic today for the following health issues:    HPI   Diarrhea      Duration: 9 days - went to a picnic with mom - had spinach salad.  Mother did not eat the spinach saladand has not had symptoms.  She picnic was during dinner time and she woke-up around 2 or 3 with diarrhea. She declines vomiting.   She states in the last 24 hours the diarrhea has been getting gradually getting less.   Previously to this she was having loose stools every 1/2 hour.  She felt better yesterday but then felt like it was worse yesterday am.     Get better in the after.  She states she only had 2 or 3 yesterday but has already had 4 loose stools this morning.  Description:       Consistency of stool: watery and loose        Blood in stool: no        Number of loose stools past 24 hours: 6    Intensity:  mild    Accompanying signs and symptoms:       Fever: no        Nausea/vomitting: no        Abdominal pain: YES- cramping        Weight loss: YES- 1lb     History (recent antibiotics or travel/ill contacts/med changes/testing done): patient had similar, symptoms last year and was diagnosed with salmonella. Symptoms were more severe last year, but did not last as long.      Precipitating or alleviating factors: None    Therapies tried and outcome: nothing     -No recent antibiotic use.  Works at the hospital.   She has a history of c diff.      Patient Active Problem List   Diagnosis     Thoracic or lumbosacral neuritis or radiculitis, unspecified     Lumbago     Advanced directives, counseling/discussion     GERD (gastroesophageal reflux disease)     Chronic cough     Colitis due to Clostridium difficile     Cervical cancer screening     Past Surgical History:   Procedure Laterality Date     C CLOSED RX NOSE/JAW FRAC+WIRES  1966     C DISKECTOMY,PERCUTANEOUS LUMBAR  7/10/07    L5-S1 with nerve root decompression     ESOPHAGOSCOPY, GASTROSCOPY, DUODENOSCOPY (EGD),  COMBINED N/A 3/15/2016    chronic gastritis     HC COLONOSCOPY THRU STOMA, DIAGNOSTIC  7/09    Diverticulosis.   Needs repeat in 10 yrs     ORTHOPEDIC SURGERY      bummionectomy     UPPER GI ENDOSCOPY  3/16    Erythematous mucosa in the greater curvature.        Social History     Tobacco Use     Smoking status: Former Smoker     Smokeless tobacco: Never Used     Tobacco comment: quit age 33   Substance Use Topics     Alcohol use: Yes     Comment: 1-2 per month     Family History   Problem Relation Age of Onset     Cancer Father         renal cell CA     Breast Cancer Maternal Aunt      Coronary Artery Disease Maternal Grandmother      Coronary Artery Disease Paternal Grandmother      Coronary Artery Disease Paternal Uncle          Current Outpatient Medications   Medication Sig Dispense Refill     BREO ELLIPTA 200-25 MCG/INH Inhaler INHALE 1 PUFF INTO THE LUNGS DAILY 1 Inhaler 3     busPIRone (BUSPAR) 5 MG tablet        CALCIUM + D 600-200 MG-UNIT OR TABS TWICE A DAY       Cholecalciferol (VITAMIN D) 2000 UNITS tablet Take 2,000 Units by mouth daily 100 tablet 3     fluticasone-vilanterol (BREO ELLIPTA) 200-25 MCG/INH inhaler Inhale 1 puff into the lungs daily 1 Inhaler 3     magnesium 250 MG tablet Take 1 tablet by mouth daily. 100 tablet 3     ranitidine (ZANTAC) 150 MG tablet Take 1 tablet (150 mg) by mouth 2 times daily 60 tablet 1     Allergies   Allergen Reactions     Vioxx      stomach upset     Nickel Rash     Accompanied by itching and swelling     Recent Labs   Lab Test 07/20/19  1016 06/13/18  1553 12/30/17  1046  10/08/12  0846   LDL  --   --   --   --  131*   HDL  --   --   --   --  46*   TRIG  --   --   --   --  96   ALT 22 31 26   < >  --    CR 0.78  --  0.74   < > 0.84   GFRESTIMATED 80  --  80   < > 70   GFRESTBLACK >90  --  >90   < > 85   POTASSIUM 3.5  --  3.9   < > 3.9   TSH  --  0.74 0.91   < > 1.03    < > = values in this interval not displayed.      BP Readings from Last 3 Encounters:  "  07/20/19 128/74   09/19/18 107/80   06/13/18 116/80    Wt Readings from Last 3 Encounters:   07/20/19 60.3 kg (133 lb)   09/19/18 62.1 kg (137 lb)   06/13/18 64.4 kg (142 lb)           Reviewed and updated as needed this visit by Provider         Review of Systems   ROS COMP: Constitutional, HEENT, cardiovascular, pulmonary, gi and gu systems are negative, except as otherwise noted.      Objective    /74 (BP Location: Right arm, Cuff Size: Adult Regular)   Pulse 106   Temp 98.6  F (37  C) (Oral)   Ht 1.651 m (5' 5\")   Wt 60.3 kg (133 lb)   LMP 08/08/2009   SpO2 100%   BMI 22.13 kg/m    Body mass index is 22.13 kg/m .  Physical Exam   GENERAL: healthy, alert and no distress  RESP: lungs clear to auscultation - no rales, rhonchi or wheezes  CV: regular rate and rhythm, normal S1 S2, no S3 or S4, no murmur, click or rub, no peripheral edema and peripheral pulses strong  ABDOMEN: soft, nontender, no hepatosplenomegaly, no masses and bowel sounds normal  PSYCH: mentation appears normal, affect normal/bright    Diagnostic Test Results:  Labs reviewed in Epic  none         Assessment & Plan   (R19.7) Diarrhea, unspecified type  (primary encounter diagnosis)  Comment:   Plan: Clostridium difficile toxin B PCR, Enteric         Bacteria and Virus Panel by CORINNE Stool, Giardia         antigen, Fecal leukocyte, Ova and Parasite Exam        Routine, Comprehensive metabolic panel, CBC         with platelets differential            (Z13.21) Encounter for vitamin deficiency screening  Comment:  Plan: Vitamin D Deficiency            See Patient Instructions  Will obtain stool studies and lab work due to ongoing diarrhea. Patient does have a history of c. Diff.  Discussed BRAT diet and pushing fluids.  Return to clinic if no improvement or symptoms worsen.  Patient verbalized understanding & agreed with plan of care.    STEPHANIE Appiah, [facility] [place]      "

## 2019-07-21 ENCOUNTER — HOSPITAL ENCOUNTER (OUTPATIENT)
Dept: LAB | Facility: CLINIC | Age: 64
Discharge: HOME | End: 2019-07-21
Attending: NURSE PRACTITIONER | Admitting: NURSE PRACTITIONER
Payer: COMMERCIAL

## 2019-07-21 DIAGNOSIS — R19.7 DIARRHEA, UNSPECIFIED TYPE: ICD-10-CM

## 2019-07-21 LAB
C DIFF TOX B STL QL: NEGATIVE
SPECIMEN SOURCE: NORMAL
SPECIMEN SOURCE: NORMAL
WBC STL QL MICRO: NORMAL

## 2019-07-21 PROCEDURE — 87329 GIARDIA AG IA: CPT | Performed by: NURSE PRACTITIONER

## 2019-07-21 PROCEDURE — 87506 IADNA-DNA/RNA PROBE TQ 6-11: CPT | Performed by: NURSE PRACTITIONER

## 2019-07-21 PROCEDURE — 87177 OVA AND PARASITES SMEARS: CPT | Performed by: NURSE PRACTITIONER

## 2019-07-21 PROCEDURE — 87493 C DIFF AMPLIFIED PROBE: CPT | Performed by: NURSE PRACTITIONER

## 2019-07-21 PROCEDURE — 87209 SMEAR COMPLEX STAIN: CPT | Performed by: NURSE PRACTITIONER

## 2019-07-21 PROCEDURE — 89055 LEUKOCYTE ASSESSMENT FECAL: CPT | Performed by: NURSE PRACTITIONER

## 2019-07-22 LAB
ALBUMIN SERPL-MCNC: 3.6 G/DL (ref 3.4–5)
ALP SERPL-CCNC: 84 U/L (ref 40–150)
ALT SERPL W P-5'-P-CCNC: 22 U/L (ref 0–50)
ANION GAP SERPL CALCULATED.3IONS-SCNC: 11 MMOL/L (ref 3–14)
AST SERPL W P-5'-P-CCNC: 18 U/L (ref 0–45)
BILIRUB SERPL-MCNC: 0.4 MG/DL (ref 0.2–1.3)
BUN SERPL-MCNC: 15 MG/DL (ref 7–30)
C COLI+JEJUNI+LARI FUSA STL QL NAA+PROBE: NOT DETECTED
CALCIUM SERPL-MCNC: 8.5 MG/DL (ref 8.5–10.1)
CHLORIDE SERPL-SCNC: 107 MMOL/L (ref 94–109)
CO2 SERPL-SCNC: 25 MMOL/L (ref 20–32)
CREAT SERPL-MCNC: 0.78 MG/DL (ref 0.52–1.04)
DEPRECATED CALCIDIOL+CALCIFEROL SERPL-MC: 73 UG/L (ref 20–75)
EC STX1 GENE STL QL NAA+PROBE: NOT DETECTED
EC STX2 GENE STL QL NAA+PROBE: NOT DETECTED
ENTERIC PATHOGEN COMMENT: NORMAL
G LAMBLIA AG STL QL IA: NORMAL
GFR SERPL CREATININE-BSD FRML MDRD: 80 ML/MIN/{1.73_M2}
GLUCOSE SERPL-MCNC: 91 MG/DL (ref 70–99)
NOROV GI+II ORF1-ORF2 JNC STL QL NAA+PR: NOT DETECTED
O+P STL MICRO: NORMAL
O+P STL MICRO: NORMAL
POTASSIUM SERPL-SCNC: 3.5 MMOL/L (ref 3.4–5.3)
PROT SERPL-MCNC: 6.9 G/DL (ref 6.8–8.8)
RVA NSP5 STL QL NAA+PROBE: NOT DETECTED
SALMONELLA SP RPOD STL QL NAA+PROBE: NOT DETECTED
SHIGELLA SP+EIEC IPAH STL QL NAA+PROBE: NOT DETECTED
SODIUM SERPL-SCNC: 143 MMOL/L (ref 133–144)
SPECIMEN SOURCE: NORMAL
SPECIMEN SOURCE: NORMAL
V CHOL+PARA RFBL+TRKH+TNAA STL QL NAA+PR: NOT DETECTED
Y ENTERO RECN STL QL NAA+PROBE: NOT DETECTED

## 2019-08-16 DIAGNOSIS — R05.3 CHRONIC COUGH: ICD-10-CM

## 2019-08-19 NOTE — TELEPHONE ENCOUNTER
"Requested Prescriptions   Pending Prescriptions Disp Refills     BREO ELLIPTA 200-25 MCG/INH Inhaler [Pharmacy Med Name: BREO ELLIPTA 200-25MCG/INH AEPB]  3     Sig: INHALE ONE PUFF BY MOUTH ONCE DAILY       Last Written Prescription Date:  4/8/2019  Last Fill Quantity: 1,  # refills: 3   Last office visit: 7/20/2019 with prescribing provider:     Future Office Visit:          Inhaled Steroids Protocol Passed - 8/19/2019  7:47 AM        Passed - Patient is age 12 or older        Passed - Recent (12 mo) or future (30 days) visit within the authorizing provider's specialty     Patient had office visit in the last 12 months or has a visit in the next 30 days with authorizing provider or within the authorizing provider's specialty.  See \"Patient Info\" tab in inbasket, or \"Choose Columns\" in Meds & Orders section of the refill encounter.              Passed - Medication is active on med list        "

## 2019-09-18 ENCOUNTER — TRANSFERRED RECORDS (OUTPATIENT)
Dept: HEALTH INFORMATION MANAGEMENT | Facility: CLINIC | Age: 64
End: 2019-09-18

## 2019-09-19 ENCOUNTER — TRANSFERRED RECORDS (OUTPATIENT)
Dept: HEALTH INFORMATION MANAGEMENT | Facility: CLINIC | Age: 64
End: 2019-09-19

## 2019-09-29 ENCOUNTER — HEALTH MAINTENANCE LETTER (OUTPATIENT)
Age: 64
End: 2019-09-29

## 2019-10-24 ENCOUNTER — TRANSFERRED RECORDS (OUTPATIENT)
Dept: HEALTH INFORMATION MANAGEMENT | Facility: CLINIC | Age: 64
End: 2019-10-24

## 2019-10-30 ENCOUNTER — HOSPITAL ENCOUNTER (OUTPATIENT)
Dept: NUCLEAR MEDICINE | Facility: CLINIC | Age: 64
Setting detail: NUCLEAR MEDICINE
Discharge: HOME | End: 2019-10-30
Attending: INTERNAL MEDICINE | Admitting: INTERNAL MEDICINE
Payer: COMMERCIAL

## 2019-10-30 ENCOUNTER — TRANSFERRED RECORDS (OUTPATIENT)
Dept: HEALTH INFORMATION MANAGEMENT | Facility: CLINIC | Age: 64
End: 2019-10-30

## 2019-10-30 DIAGNOSIS — K30 NONULCER DYSPEPSIA: ICD-10-CM

## 2019-10-30 PROCEDURE — 34300033 ZZH RX 343: Performed by: INTERNAL MEDICINE

## 2019-10-30 PROCEDURE — 78264 GASTRIC EMPTYING IMG STUDY: CPT

## 2019-10-30 PROCEDURE — A9541 TC99M SULFUR COLLOID: HCPCS | Performed by: INTERNAL MEDICINE

## 2019-10-30 RX ADMIN — Medication 2 MCI.: at 08:13

## 2019-11-11 ENCOUNTER — TRANSFERRED RECORDS (OUTPATIENT)
Dept: HEALTH INFORMATION MANAGEMENT | Facility: CLINIC | Age: 64
End: 2019-11-11

## 2019-11-12 PROBLEM — K31.84 GASTROPARESIS: Status: ACTIVE | Noted: 2019-01-01

## 2019-12-23 DIAGNOSIS — R05.3 CHRONIC COUGH: ICD-10-CM

## 2019-12-23 NOTE — TELEPHONE ENCOUNTER
"Requested Prescriptions   Pending Prescriptions Disp Refills     BREO ELLIPTA 200-25 MCG/INH Inhaler [Pharmacy Med Name: BREO ELLIPTA 200-25MCG/INH AEPB] 1 Inhaler 0     Sig: INHALE 1 PUFF INTO THE LUNGS ONCE DAILY       Last Written Prescription Date:  8/20/2019  Last Fill Quantity: 1inhaler,  # refills: 3   Last office visit: 7/20/2019 with prescribing provider:     Future Office Visit:          Inhaled Steroids Protocol Passed - 12/23/2019  8:44 AM        Passed - Patient is age 12 or older        Passed - Recent (12 mo) or future (30 days) visit within the authorizing provider's specialty     Patient has had an office visit with the authorizing provider or a provider within the authorizing providers department within the previous 12 mos or has a future within next 30 days. See \"Patient Info\" tab in inbasket, or \"Choose Columns\" in Meds & Orders section of the refill encounter.              Passed - Medication is active on med list        "

## 2020-01-21 DIAGNOSIS — R05.3 CHRONIC COUGH: ICD-10-CM

## 2020-01-22 NOTE — TELEPHONE ENCOUNTER
"Requested Prescriptions   Pending Prescriptions Disp Refills     BREO ELLIPTA 200-25 MCG/INH Inhaler [Pharmacy Med Name: BREO ELLIPTA 200-25MCG/INH AEPB]        Last Written Prescription Date:  12.23.19  Last Fill Quantity: 1 inhaler,  # refills: 0   Last office visit: 7/20/2019 with prescribing provider:  HELDER Martino      Future Office Visit:       60 each 0     Sig: INHALE ONE PUFF BY MOUTH ONCE DAILY       Inhaled Steroids Protocol Passed - 1/22/2020  9:21 AM        Passed - Patient is age 12 or older        Passed - Recent (12 mo) or future (30 days) visit within the authorizing provider's specialty     Patient has had an office visit with the authorizing provider or a provider within the authorizing providers department within the previous 12 mos or has a future within next 30 days. See \"Patient Info\" tab in inbasket, or \"Choose Columns\" in Meds & Orders section of the refill encounter.              Passed - Medication is active on med list        "

## 2020-02-03 ENCOUNTER — TRANSFERRED RECORDS (OUTPATIENT)
Dept: HEALTH INFORMATION MANAGEMENT | Facility: CLINIC | Age: 65
End: 2020-02-03

## 2020-03-24 DIAGNOSIS — R05.3 CHRONIC COUGH: ICD-10-CM

## 2020-04-13 DIAGNOSIS — K21.00 GASTROESOPHAGEAL REFLUX DISEASE WITH ESOPHAGITIS: Primary | ICD-10-CM

## 2020-04-13 RX ORDER — LANSOPRAZOLE 30 MG/1
30 CAPSULE, DELAYED RELEASE ORAL
Qty: 90 CAPSULE | Refills: 0 | Status: SHIPPED | OUTPATIENT
Start: 2020-04-13 | End: 2020-04-16

## 2020-04-13 NOTE — TELEPHONE ENCOUNTER
Reason for Call:  Medication or medication refill:    Do you use a [facility]?  Name of the pharmacy and phone number for the current request:  [facility]  [place] - [address] [place] [address]      Name of the medication requested:     Other request: patient would like to start taking this again please using a rx from 2016.  Can we leave a detailed message on this number? YES    Phone number patient can be reached at: Cell number on file:    Telephone Information:   Mobile [phone]       Best Time: anytime    Call taken on 4/13/2020 at 1:20 PM by Stacey Verma

## 2020-04-14 NOTE — TELEPHONE ENCOUNTER
appt made for 4/16/2020 at 10:10am with Dr. Montana for medication check.    Kelly Hopkins MA on 4/14/2020 at 3:19 PM

## 2020-04-16 ENCOUNTER — VIRTUAL VISIT (OUTPATIENT)
Dept: FAMILY MEDICINE | Facility: CLINIC | Age: 65
End: 2020-04-16
Payer: COMMERCIAL

## 2020-04-16 DIAGNOSIS — K31.84 GASTROPARESIS: Primary | ICD-10-CM

## 2020-04-16 DIAGNOSIS — R05.3 CHRONIC COUGH: ICD-10-CM

## 2020-04-16 DIAGNOSIS — K21.00 GASTROESOPHAGEAL REFLUX DISEASE WITH ESOPHAGITIS: ICD-10-CM

## 2020-04-16 DIAGNOSIS — Z51.81 MEDICATION MONITORING ENCOUNTER: ICD-10-CM

## 2020-04-16 DIAGNOSIS — R63.4 LOSS OF WEIGHT: ICD-10-CM

## 2020-04-16 DIAGNOSIS — R68.81 EARLY SATIETY: ICD-10-CM

## 2020-04-16 PROCEDURE — 99214 OFFICE O/P EST MOD 30 MIN: CPT | Mod: 95 | Performed by: FAMILY MEDICINE

## 2020-04-16 RX ORDER — LANSOPRAZOLE 30 MG/1
30 CAPSULE, DELAYED RELEASE ORAL
Qty: 90 CAPSULE | Refills: 3 | Status: SHIPPED | OUTPATIENT
Start: 2020-04-16 | End: 2021-05-03

## 2020-04-16 NOTE — PROGRESS NOTES
[facility]    Telephone visit  - [phone]    Subjective    Denia Dailey is a 64 year old female who is being evaluated via a billable telephone visit.      Chief Complaint   Patient presents with     RECHECK     GASTROPARESIS/ BEEN ON A LOW RESIDUE DIET     Weight loss 30 pounds over 1-2 years . Due to feeling full unable to eat    GERD/gastroparesis - Dr Ramirez - low residue diet - virtual colonoscopy 9/19 normal per patient - EGD 3/16 - normal/erythema/H pylori followed in 4/16 by IMANI Lee - 2018 Salmonella    No recent follow up with GI - over the last 3 yrs, have lost 30 lbs - appetite ok, on low residue diet, no f/c/s, feels fine, early satiety/nausea after eating, lasts 30 minutes, rare vomiting, occas heartburn/burping, stools normal brown and soft - no acute changes - prevacid helps    Chronic cough - pulmonary work up negative/Video swallow neg/Ct neg - on Breo long term with help    PMH    Past Medical History:   Diagnosis Date     Chronic cough      Gastroparesis 2019    mild/moderate - [place] [facility]     GERD (gastroesophageal reflux disease)      Thoracic or lumbosacral neuritis or radiculitis, unspecified 5/2/2007    Disc Herniation at L4-5, L5-S1       PSH    Past Surgical History:   Procedure Laterality Date     C CLOSED RX NOSE/JAW FRAC+WIRES  1966     C DISKECTOMY,PERCUTANEOUS LUMBAR  7/10/07    L5-S1 with nerve root decompression     COLONOSCOPY  7/09, 9/19    Diverticulosis, Ct Colonography, due 5 yrs     ESOPHAGOSCOPY, GASTROSCOPY, DUODENOSCOPY (EGD), COMBINED N/A 3/15/2016    chronic gastritis     ORTHOPEDIC SURGERY      bummionectomy     UPPER GI ENDOSCOPY  3/16    Erythematous mucosa in the greater curvature.        Medications    Current Outpatient Medications   Medication Sig Dispense Refill     BREO  ELLIPTA 200-25 MCG/INH Inhaler INHALE ONE PUFF BY MOUTH ONCE DAILY 60 each 1     CALCIUM + D 600-200 MG-UNIT OR TABS TWICE A DAY       Cholecalciferol (VITAMIN D) 2000 UNITS tablet Take 2,000 Units by mouth daily 100 tablet 3     LANsoprazole (PREVACID) 30 MG DR capsule Take 1 capsule (30 mg) by mouth every morning (before breakfast) 90 capsule 3     magnesium 250 MG tablet Take 1 tablet by mouth daily. 100 tablet 3       Allergies    Vioxx and Nickel    Family History    Family History   Problem Relation Age of Onset     Cancer Father         renal cell CA     Breast Cancer Maternal Aunt      Coronary Artery Disease Maternal Grandmother      Coronary Artery Disease Paternal Grandmother      Coronary Artery Disease Paternal Uncle        Social History    Social History     Socioeconomic History     Marital status:      Spouse name: Haroldo     Number of children: 0     Years of education: 16     Highest education level: Not on file   Occupational History     Occupation: NURSE     Employer: [employer]   Social Needs     Financial resource strain: Not on file     Food insecurity     Worry: Not on file     Inability: Not on file     Transportation needs     Medical: Not on file     Non-medical: Not on file   Tobacco Use     Smoking status: Former Smoker     Smokeless tobacco: Never Used     Tobacco comment: quit age 33   Substance and Sexual Activity     Alcohol use: Yes     Comment: 1-2 per month     Drug use: No     Sexual activity: Never   Lifestyle     Physical activity     Days per week: Not on file     Minutes per session: Not on file     Stress: Not on file   Relationships     Social connections     Talks on phone: Not on file     Gets together: Not on file     Attends Oriental orthodox service: Not on file     Active member of club or organization: Not on file     Attends meetings of clubs or organizations: Not on file     Relationship status: Not on file     Intimate partner  violence     Fear of current or ex partner: Not on file     Emotionally abused: Not on file     Physically abused: Not on file     Forced sexual activity: Not on file   Other Topics Concern      Service Not Asked     Blood Transfusions Not Asked     Caffeine Concern Not Asked     Occupational Exposure Not Asked     Hobby Hazards Not Asked     Sleep Concern Not Asked     Stress Concern Not Asked     Weight Concern Not Asked     Special Diet Not Asked     Back Care Not Asked     Exercise Not Asked     Bike Helmet Not Asked     Seat Belt Not Asked     Self-Exams Not Asked     Parent/sibling w/ CABG, MI or angioplasty before 65F 55M? Not Asked   Social History Narrative     Not on file       Reviewed and updated as needed this visit by Provider           Review of Systems     ROS COMP: CONSTITUTIONAL: NEGATIVE for fever, chills, change in weight  INTEGUMENTARY/SKIN: NEGATIVE for worrisome rashes, moles or lesions  EYES: NEGATIVE for vision changes or irritation  ENT/MOUTH: NEGATIVE for ear, mouth and throat problems  RESP: NEGATIVE for significant cough or SOB  CV: NEGATIVE for chest pain, palpitations or peripheral edema  GI: NEGATIVE for nausea, abdominal pain, heartburn, or change in bowel habits  : NEGATIVE for frequency, dysuria, or hematuria  MUSCULOSKELETAL: NEGATIVE for significant arthralgias or myalgia  NEURO: NEGATIVE for weakness, dizziness or paresthesias  ENDOCRINE: NEGATIVE for temperature intolerance, skin/hair changes  HEME: NEGATIVE for bleeding problems  PSYCHIATRIC: NEGATIVE for changes in mood or affect    Objective    Reported vitals:  LMP 08/08/2009      healthy, alert and no distress  Psych: Alert and oriented times 3; coherent speech, normal   rate and volume, able to articulate logical thoughts, able   to abstract reason, no tangential thoughts, no hallucinations   or delusions  Her affect is normal     Diagnostic Test Results:    Reviewed labs and imaging    Assessment/Plan:       "ICD-10-CM    1. Gastroparesis  K31.84 GASTROENTEROLOGY ADULT REF CONSULT ONLY   2. Gastroesophageal reflux disease with esophagitis  K21.0 LANsoprazole (PREVACID) 30 MG DR capsule     GASTROENTEROLOGY ADULT REF CONSULT ONLY   3. Chronic cough  R05    4. Loss of weight  R63.4    5. Early satiety  R68.81    6. Medication monitoring encounter  Z51.81      Continue current cares  Prevacid refill  [facility] consult for GERD/Gastroparesis  Get notes/labs/HCM faxed over from Dr Kirk    Return in about 3 months (around 7/16/2020), or if symptoms worsen or fail to improve, for Medication Recheck Visit, Follow Up Chronic.    Phone call duration:  17 minutes    The patient has been notified of following:     \"This telephone visit will be conducted via a call between you and your physician/provider. We have found that certain health care needs can be provided without the need for a physical exam.  This service lets us provide the care you need with a short phone conversation.  If a prescription is necessary we can send it directly to your pharmacy.  If lab work is needed we can place an order for that and you can then stop by our lab to have the test done at a later time.    Telephone visits are billed at different rates depending on your insurance coverage. During this emergency period, for some insurers they may be billed the same as an in-person visit.  Please reach out to your insurance provider with any questions.    If during the course of the call the physician/provider feels a telephone visit is not appropriate, you will not be charged for this service.\"    Patient has given verbal consent for Telephone visit?  Yes    How would you like to obtain your AVS? Ann Marie Montana MD, FAAFP     [facility]  [address] [place] [address]  [email or web address]   Office: [phone]  Fax: [phone]  Pager: [phone]     "

## 2020-04-20 NOTE — TELEPHONE ENCOUNTER
RECORDS RECEIVED FROM: Internal referral   DATE RECEIVED: 4/27/20 8AM   NOTES STATUS DETAILS   OFFICE NOTE from referring provider In process Virtual OV 4/16/20 Rubén BEDOLLA   OFFICE NOTE from other specialist In process [facility] recs scanned In epic     Fax to [facility] to obtain recs - 4/20 12:40PM   DISCHARGE SUMMARY from hospital N/A    OPERATIVE REPORT N/A    MEDICATION LIST Internal         ENDOSCOPY  Received  3/15/16    COLONOSCOPY Received 9/18/19   ERCP N/A    EUS N/A    STOOL TESTING Internal  6/29/16   PERTINENT LABS Received Lab scanned 10/24/19   PATHOLOGY REPORTS (RELATED) N/A    IMAGING (CT, MRI, EGD) Internal / [facility] PACS  NM Gastric 10/30/19  CT Colongraphy 9/18/19   XR Video Swallow 7/13/18  CT ABD Pelvis 6/29/18     REFERRAL INFORMATION    Date referral was placed: 4/27/20    Date all records received: N/A   Date records were scanned into Epic: N/A   Date records were sent to Provider to review: N/A   Date and recommendation received from provider:  LETTER SENT  SCHEDULE APPOINTMENT   Date patient was contacted to schedule: 4/20/20

## 2020-04-27 ENCOUNTER — PRE VISIT (OUTPATIENT)
Dept: GASTROENTEROLOGY | Facility: CLINIC | Age: 65
End: 2020-04-27

## 2020-04-27 ENCOUNTER — VIRTUAL VISIT (OUTPATIENT)
Dept: GASTROENTEROLOGY | Facility: CLINIC | Age: 65
End: 2020-04-27
Payer: COMMERCIAL

## 2020-04-27 DIAGNOSIS — R63.4 WEIGHT LOSS: Primary | ICD-10-CM

## 2020-04-27 NOTE — PATIENT INSTRUCTIONS
It was a pleasure taking care of you today.  I've included a brief summary of our discussion and care plan from today's visit below.  Please review this information with your primary care provider.  _______________________________________________________________________    My recommendations are summarized as follows:  - get labs drawn- CMP, CBC, TSH (ordered)  - non-urgent repeat EGD with duodenal (r/o Whipples) and gastric biopsies- coordinate with Dr Ramirez's office (or ours if you would prefer to have this done at [facility])  - consider CT chest, abdomen, pelvis with IV contrast if the above is normal    Please call our nurse Yolanda with any questions or concerns- [phone].  --    Return to GI Clinic as needed to review your progress.    _______________________________________________________________________    Who do I call with any questions after my visit?  Please be in touch if there are any further questions that arise following today's visit.  There are multiple ways to contact your gastroenterology care team.        During business hours, you may reach a Gastroenterology nurse at [phone] and choose option 3.         To schedule or reschedule an appointment, please call [phone].       You can always send a secure message through [employer].  [employer] messages are answered by your nurse or doctor typically within 24 hours.  Please allow extra time on weekends and holidays.        For urgent/emergent questions after business hours, you may reach the on-call GI Fellow by contacting the [facility] at [phone].     How will I get the results of any tests ordered?    You will receive all of your results.  If you have signed up for [employer], any tests ordered at your visit will be available to you after your physician reviews them.  Typically this takes 1-2 weeks.  If there are urgent results that require a change in your care plan, your physician or nurse will call you to discuss the  next steps.      What is [employer]?  [employer] is a secure way for you to access all of your healthcare records from the [facility].  It is a web based computer program, so you can sign on to it from any location.  It also allows you to send secure messages to your care team.  I recommend signing up for [employer] access if you have not already done so and are comfortable with using a computer.      How to I schedule a follow-up visit?  If you did not schedule a follow-up visit today, please call [phone] to schedule a follow-up office visit.        Sincerely,    Bakari Willard MD     [facility]  Division of Gastroenterology

## 2020-04-27 NOTE — PROGRESS NOTES
"Denia Dailey is a 64 year old female who is being evaluated via a billable telephone visit.      The patient has been notified of following:     \"This telephone visit will be conducted via a call between you and your physician/provider. We have found that certain health care needs can be provided without the need for a physical exam.  This service lets us provide the care you need with a short phone conversation.  If a prescription is necessary we can send it directly to your pharmacy.  If lab work is needed we can place an order for that and you can then stop by our lab to have the test done at a later time.    Telephone visits are billed at different rates depending on your insurance coverage. During this emergency period, for some insurers they may be billed the same as an in-person visit.  Please reach out to your insurance provider with any questions.    If during the course of the call the physician/provider feels a telephone visit is not appropriate, you will not be charged for this service.\"    Patient has given verbal consent for Telephone visit?  Yes    How would you like to obtain your AVS? MyChart    Referring providers: Dr. Ramirez, Dr. Montana    Chief complaint: 64-year-old woman referred for clinic visit for second opinion regarding weight loss by Dr. Ramirez and Dr. Montana.    HPI:  64-year-old woman with chronic cough who presents for evaluation of weight loss.    She started losing weight around 2017.  Since then, she has lost approximately 30 pounds in total.    Her GI history starts with being diagnosed with H. pylori around 2016.  She received treatment and underwent upper endoscopy 3/15/2016 with gastric biopsies which confirmed eradication.  No small bowel biopsies were taken.  In 2018 she was diagnosed with C. difficile and required multiple courses of vancomycin, eventually was cured after a course of Dificid.  She was also diagnosed with Salmonella at one point.  She underwent CT scan for " evaluation of weight loss in 2018 which was unremarkable.  She had a video swallow in 2018 for chronic cough that was also unremarkable.  Her colonoscopy in 2019 was aborted due to severe coughing.  She underwent CT colonoscopy which was unremarkable with the exception of diverticulosis.  She underwent a gastric emptying study which was read as mild delayed gastric emptying as evidenced by mild delayed emptying half-time, however percent retention at 4 hours was normal at 1%.  She underwent unremarkable stool testing.  Celiac testing including TTG, DGP and serum IgA was normal.    Since this work-up, she has continued to lose weight, albeit slowly.  As mentioned before, she has lost 30 pounds in total since 2017.  Her appetite is fair but she really only eats half portions.  She was following a low residue diet at the recommendation of a dietitian.  Otherwise she feels well.  She has 1 bowel movement per day without diarrhea.  She has rare nausea with vomiting.  No skin rashes.  No fever or night sweats.  No arthralgias.  No dysphasia.    Her current medications include lansoprazole, Breo, Ca, Vit D, Mg, MV.  She is a non-smoker and rarely drinks alcohol.  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    Past Medical History:   Diagnosis Date     Chronic cough      Gastroparesis 2019    mild/moderate - [facility]     GERD (gastroesophageal reflux disease)      Thoracic or lumbosacral neuritis or radiculitis, unspecified 5/2/2007    Disc Herniation at L4-5, L5-S1       Past Surgical History:   Procedure Laterality Date     C CLOSED RX NOSE/JAW FRAC+WIRES  1966     C DISKECTOMY,PERCUTANEOUS LUMBAR  7/10/07    L5-S1 with nerve root decompression     COLONOSCOPY  7/09, 9/19    Diverticulosis, Ct Colonography, due 5 yrs     ESOPHAGOSCOPY, GASTROSCOPY, DUODENOSCOPY (EGD), COMBINED N/A 3/15/2016    chronic gastritis     ORTHOPEDIC SURGERY      bummionectomy     UPPER GI ENDOSCOPY  3/16     Erythematous mucosa in the greater curvature.        Family History   Problem Relation Age of Onset     Cancer Father         renal cell CA     Breast Cancer Maternal Aunt      Coronary Artery Disease Maternal Grandmother      Coronary Artery Disease Paternal Grandmother      Coronary Artery Disease Paternal Uncle        Social History     Tobacco Use     Smoking status: Former Smoker     Smokeless tobacco: Never Used     Tobacco comment: quit age 33   Substance Use Topics     Alcohol use: Yes     Comment: 1-2 per month     Assessment and recommendation:  64-year-old woman with chronic cough who presents for evaluation of weight loss.    # Weight loss  Gradual unintentional weight loss without any other red flag signs or symptoms.  Prior work-up includes EGD in 2016, CT 2018, CT colonoscopy 2019, and lab testing including routine labs+ celiac, enteric panel.  Symptoms are not consistent with malabsorption and she does not have any obvious reason to have malabsorption.    Without any obvious systems to evaluate, would recommend repeating routine blood work, followed by repeat EGD with small bowel and gastric biopsies, then consider repeat CT chest abdomen pelvis.    I do not think she has delayed gastric emptying based on normal gastric emptying at 4 hours.    She would like this appointment to function as a second opinion and to continue following with Dr. Ramirez with [facility].  We will fax this note to his office.    - CMP, CBC, TSH (ordered)  - non-urgent repeat EGD with duodenal (r/o Whipples) and gastric biopsies  - consider CT C/A/P with IV contrast    RTC as needed.     Phone call duration: 0842AM - 0901, 19 minutes    Bakari Willard MD    [facility]  Division of Gastroenterology  [phone]

## 2020-04-29 ENCOUNTER — HOSPITAL ENCOUNTER (OUTPATIENT)
Dept: LAB | Facility: CLINIC | Age: 65
Discharge: HOME | End: 2020-04-29
Attending: INTERNAL MEDICINE | Admitting: INTERNAL MEDICINE
Payer: COMMERCIAL

## 2020-04-29 DIAGNOSIS — R63.4 WEIGHT LOSS: ICD-10-CM

## 2020-04-29 LAB
ALBUMIN SERPL-MCNC: 3.3 G/DL (ref 3.4–5)
ALP SERPL-CCNC: 80 U/L (ref 40–150)
ALT SERPL W P-5'-P-CCNC: 25 U/L (ref 0–50)
ANION GAP SERPL CALCULATED.3IONS-SCNC: 5 MMOL/L (ref 3–14)
AST SERPL W P-5'-P-CCNC: 18 U/L (ref 0–45)
BASOPHILS # BLD AUTO: 0 10E9/L (ref 0–0.2)
BASOPHILS NFR BLD AUTO: 0.4 %
BILIRUB SERPL-MCNC: 0.4 MG/DL (ref 0.2–1.3)
BUN SERPL-MCNC: 20 MG/DL (ref 7–30)
CALCIUM SERPL-MCNC: 8.4 MG/DL (ref 8.5–10.1)
CHLORIDE SERPL-SCNC: 109 MMOL/L (ref 94–109)
CO2 SERPL-SCNC: 25 MMOL/L (ref 20–32)
CREAT SERPL-MCNC: 0.68 MG/DL (ref 0.52–1.04)
DIFFERENTIAL METHOD BLD: NORMAL
EOSINOPHIL # BLD AUTO: 0.1 10E9/L (ref 0–0.7)
EOSINOPHIL NFR BLD AUTO: 1.4 %
ERYTHROCYTE [DISTWIDTH] IN BLOOD BY AUTOMATED COUNT: 13 % (ref 10–15)
GFR SERPL CREATININE-BSD FRML MDRD: >90 ML/MIN/{1.73_M2}
GLUCOSE SERPL-MCNC: 118 MG/DL (ref 70–99)
HCT VFR BLD AUTO: 40.4 % (ref 35–47)
HGB BLD-MCNC: 13.9 G/DL (ref 11.7–15.7)
IMM GRANULOCYTES # BLD: 0 10E9/L (ref 0–0.4)
IMM GRANULOCYTES NFR BLD: 0 %
LYMPHOCYTES # BLD AUTO: 0.9 10E9/L (ref 0.8–5.3)
LYMPHOCYTES NFR BLD AUTO: 15.9 %
MCH RBC QN AUTO: 31.3 PG (ref 26.5–33)
MCHC RBC AUTO-ENTMCNC: 34.4 G/DL (ref 31.5–36.5)
MCV RBC AUTO: 91 FL (ref 78–100)
MONOCYTES # BLD AUTO: 0.6 10E9/L (ref 0–1.3)
MONOCYTES NFR BLD AUTO: 10.5 %
NEUTROPHILS # BLD AUTO: 4 10E9/L (ref 1.6–8.3)
NEUTROPHILS NFR BLD AUTO: 71.8 %
NRBC # BLD AUTO: 0 10*3/UL
NRBC BLD AUTO-RTO: 0 /100
PLATELET # BLD AUTO: 281 10E9/L (ref 150–450)
POTASSIUM SERPL-SCNC: 3.8 MMOL/L (ref 3.4–5.3)
PROT SERPL-MCNC: 6.3 G/DL (ref 6.8–8.8)
RBC # BLD AUTO: 4.44 10E12/L (ref 3.8–5.2)
SODIUM SERPL-SCNC: 139 MMOL/L (ref 133–144)
TSH SERPL DL<=0.005 MIU/L-ACNC: 0.84 MU/L (ref 0.4–4)
WBC # BLD AUTO: 5.6 10E9/L (ref 4–11)

## 2020-04-29 PROCEDURE — 36415 COLL VENOUS BLD VENIPUNCTURE: CPT | Performed by: INTERNAL MEDICINE

## 2020-04-29 PROCEDURE — 80053 COMPREHEN METABOLIC PANEL: CPT | Performed by: INTERNAL MEDICINE

## 2020-04-29 PROCEDURE — 85025 COMPLETE CBC W/AUTO DIFF WBC: CPT | Performed by: INTERNAL MEDICINE

## 2020-04-29 PROCEDURE — 84443 ASSAY THYROID STIM HORMONE: CPT | Performed by: INTERNAL MEDICINE

## 2020-05-28 DIAGNOSIS — R05.3 CHRONIC COUGH: ICD-10-CM

## 2020-07-08 ENCOUNTER — HOSPITAL ENCOUNTER (OUTPATIENT)
Dept: MAMMOGRAPHY | Facility: CLINIC | Age: 65
Discharge: HOME | End: 2020-07-08
Attending: OBSTETRICS & GYNECOLOGY | Admitting: OBSTETRICS & GYNECOLOGY
Payer: COMMERCIAL

## 2020-07-08 DIAGNOSIS — Z12.31 VISIT FOR SCREENING MAMMOGRAM: ICD-10-CM

## 2020-07-08 PROCEDURE — 77063 BREAST TOMOSYNTHESIS BI: CPT

## 2021-01-14 ENCOUNTER — HEALTH MAINTENANCE LETTER (OUTPATIENT)
Age: 66
End: 2021-01-14

## 2021-04-07 DIAGNOSIS — R05.3 CHRONIC COUGH: ICD-10-CM

## 2021-04-07 NOTE — TELEPHONE ENCOUNTER
LOV: 4/16/2020  Patient due for physical or med check  No future appt scheduled    Routing to [facility] to assist in scheduling      Marlene Sunshine RN  [facility]

## 2021-04-13 NOTE — TELEPHONE ENCOUNTER
FIDELM @ [phone] # for patient to call back so we can schedule appointment.  2nd attempt    Rika Beck, [facility]

## 2021-05-03 DIAGNOSIS — K21.00 GASTROESOPHAGEAL REFLUX DISEASE WITH ESOPHAGITIS: ICD-10-CM

## 2021-05-03 RX ORDER — LANSOPRAZOLE 30 MG/1
CAPSULE, DELAYED RELEASE ORAL
Qty: 90 CAPSULE | Refills: 0 | Status: SHIPPED | OUTPATIENT
Start: 2021-05-03 | End: 2021-05-24

## 2021-05-03 NOTE — TELEPHONE ENCOUNTER
Next 5 appointments (look out 90 days)    May 24, 2021  9:30 AM  Ann Marie De Jesus with Pa Montana MD  [facility] ([facility] ) [address] [place] [id number] [phone]        Medication is being filled for 1 time refill only due to:  Patient needs to be seen because it has been more than one year since last visit.    Marlene Sunshine RN  [facility]

## 2021-05-24 ENCOUNTER — OFFICE VISIT (OUTPATIENT)
Dept: FAMILY MEDICINE | Facility: CLINIC | Age: 66
End: 2021-05-24
Payer: COMMERCIAL

## 2021-05-24 VITALS
HEART RATE: 68 BPM | DIASTOLIC BLOOD PRESSURE: 72 MMHG | RESPIRATION RATE: 20 BRPM | WEIGHT: 119 LBS | SYSTOLIC BLOOD PRESSURE: 124 MMHG | TEMPERATURE: 98.4 F | OXYGEN SATURATION: 99 % | BODY MASS INDEX: 19.83 KG/M2 | HEIGHT: 65 IN

## 2021-05-24 DIAGNOSIS — Z51.81 MEDICATION MONITORING ENCOUNTER: ICD-10-CM

## 2021-05-24 DIAGNOSIS — Z12.11 SCREEN FOR COLON CANCER: ICD-10-CM

## 2021-05-24 DIAGNOSIS — K21.00 GASTROESOPHAGEAL REFLUX DISEASE WITH ESOPHAGITIS WITHOUT HEMORRHAGE: ICD-10-CM

## 2021-05-24 DIAGNOSIS — R59.0 ADENOPATHY, CERVICAL: ICD-10-CM

## 2021-05-24 DIAGNOSIS — Z12.31 ENCOUNTER FOR SCREENING MAMMOGRAM FOR MALIGNANT NEOPLASM OF BREAST: ICD-10-CM

## 2021-05-24 DIAGNOSIS — R63.4 LOSS OF WEIGHT: ICD-10-CM

## 2021-05-24 DIAGNOSIS — Z23 NEED FOR SHINGLES VACCINE: ICD-10-CM

## 2021-05-24 DIAGNOSIS — Z23 NEED FOR PNEUMOCOCCAL VACCINATION: ICD-10-CM

## 2021-05-24 DIAGNOSIS — R68.81 EARLY SATIETY: ICD-10-CM

## 2021-05-24 DIAGNOSIS — Z13.820 SCREENING FOR OSTEOPOROSIS: ICD-10-CM

## 2021-05-24 DIAGNOSIS — R05.3 CHRONIC COUGH: ICD-10-CM

## 2021-05-24 DIAGNOSIS — K31.84 GASTROPARESIS: ICD-10-CM

## 2021-05-24 DIAGNOSIS — Z00.00 ROUTINE GENERAL MEDICAL EXAMINATION AT A HEALTH CARE FACILITY: Primary | ICD-10-CM

## 2021-05-24 LAB
ALBUMIN SERPL-MCNC: 3.7 G/DL (ref 3.4–5)
ALBUMIN UR-MCNC: NEGATIVE MG/DL
ALP SERPL-CCNC: 82 U/L (ref 40–150)
ALT SERPL W P-5'-P-CCNC: 25 U/L (ref 0–50)
ANION GAP SERPL CALCULATED.3IONS-SCNC: 6 MMOL/L (ref 3–14)
APPEARANCE UR: CLEAR
AST SERPL W P-5'-P-CCNC: 20 U/L (ref 0–45)
BACTERIA #/AREA URNS HPF: ABNORMAL /HPF
BILIRUB SERPL-MCNC: 0.8 MG/DL (ref 0.2–1.3)
BILIRUB UR QL STRIP: NEGATIVE
BUN SERPL-MCNC: 12 MG/DL (ref 7–30)
CALCIUM SERPL-MCNC: 8.7 MG/DL (ref 8.5–10.1)
CHLORIDE SERPL-SCNC: 108 MMOL/L (ref 94–109)
CHOLEST SERPL-MCNC: 202 MG/DL
CK SERPL-CCNC: 74 U/L (ref 30–225)
CO2 SERPL-SCNC: 28 MMOL/L (ref 20–32)
COLOR UR AUTO: YELLOW
CREAT SERPL-MCNC: 0.79 MG/DL (ref 0.52–1.04)
ERYTHROCYTE [DISTWIDTH] IN BLOOD BY AUTOMATED COUNT: 12.9 % (ref 10–15)
GFR SERPL CREATININE-BSD FRML MDRD: 79 ML/MIN/{1.73_M2}
GLUCOSE SERPL-MCNC: 86 MG/DL (ref 70–99)
GLUCOSE UR STRIP-MCNC: NEGATIVE MG/DL
HCT VFR BLD AUTO: 40.3 % (ref 35–47)
HDLC SERPL-MCNC: 60 MG/DL
HGB BLD-MCNC: 13.9 G/DL (ref 11.7–15.7)
HGB UR QL STRIP: ABNORMAL
KETONES UR STRIP-MCNC: NEGATIVE MG/DL
LDLC SERPL CALC-MCNC: 117 MG/DL
LEUKOCYTE ESTERASE UR QL STRIP: NEGATIVE
MCH RBC QN AUTO: 31 PG (ref 26.5–33)
MCHC RBC AUTO-ENTMCNC: 34.5 G/DL (ref 31.5–36.5)
MCV RBC AUTO: 90 FL (ref 78–100)
NITRATE UR QL: NEGATIVE
NON-SQ EPI CELLS #/AREA URNS LPF: ABNORMAL /LPF
NONHDLC SERPL-MCNC: 142 MG/DL
PH UR STRIP: 7 PH (ref 5–7)
PLATELET # BLD AUTO: 206 10E9/L (ref 150–450)
POTASSIUM SERPL-SCNC: 3.9 MMOL/L (ref 3.4–5.3)
PROT SERPL-MCNC: 6.4 G/DL (ref 6.8–8.8)
RBC # BLD AUTO: 4.48 10E12/L (ref 3.8–5.2)
RBC #/AREA URNS AUTO: ABNORMAL /HPF
SODIUM SERPL-SCNC: 142 MMOL/L (ref 133–144)
SOURCE: ABNORMAL
SP GR UR STRIP: 1.01 (ref 1–1.03)
TRIGL SERPL-MCNC: 127 MG/DL
TSH SERPL DL<=0.005 MIU/L-ACNC: 0.68 MU/L (ref 0.4–4)
UROBILINOGEN UR STRIP-ACNC: 0.2 EU/DL (ref 0.2–1)
WBC # BLD AUTO: 3 10E9/L (ref 4–11)
WBC #/AREA URNS AUTO: ABNORMAL /HPF

## 2021-05-24 PROCEDURE — 90750 HZV VACC RECOMBINANT IM: CPT | Performed by: FAMILY MEDICINE

## 2021-05-24 PROCEDURE — 99397 PER PM REEVAL EST PAT 65+ YR: CPT | Mod: 25 | Performed by: FAMILY MEDICINE

## 2021-05-24 PROCEDURE — 85027 COMPLETE CBC AUTOMATED: CPT | Performed by: FAMILY MEDICINE

## 2021-05-24 PROCEDURE — 84443 ASSAY THYROID STIM HORMONE: CPT | Performed by: FAMILY MEDICINE

## 2021-05-24 PROCEDURE — 80061 LIPID PANEL: CPT | Performed by: FAMILY MEDICINE

## 2021-05-24 PROCEDURE — 36415 COLL VENOUS BLD VENIPUNCTURE: CPT | Performed by: FAMILY MEDICINE

## 2021-05-24 PROCEDURE — 81001 URINALYSIS AUTO W/SCOPE: CPT | Performed by: FAMILY MEDICINE

## 2021-05-24 PROCEDURE — 90670 PCV13 VACCINE IM: CPT | Performed by: FAMILY MEDICINE

## 2021-05-24 PROCEDURE — 82043 UR ALBUMIN QUANTITATIVE: CPT | Performed by: FAMILY MEDICINE

## 2021-05-24 PROCEDURE — 80053 COMPREHEN METABOLIC PANEL: CPT | Performed by: FAMILY MEDICINE

## 2021-05-24 PROCEDURE — 90471 IMMUNIZATION ADMIN: CPT | Performed by: FAMILY MEDICINE

## 2021-05-24 PROCEDURE — 82550 ASSAY OF CK (CPK): CPT | Performed by: FAMILY MEDICINE

## 2021-05-24 PROCEDURE — 90472 IMMUNIZATION ADMIN EACH ADD: CPT | Performed by: FAMILY MEDICINE

## 2021-05-24 RX ORDER — OMEPRAZOLE 40 MG/1
40 CAPSULE, DELAYED RELEASE ORAL DAILY
Qty: 90 CAPSULE | Refills: 3 | Status: SHIPPED | OUTPATIENT
Start: 2021-05-24 | End: 2021-05-25 | Stop reason: ALTCHOICE

## 2021-05-24 SDOH — HEALTH STABILITY: MENTAL HEALTH: HOW OFTEN DO YOU HAVE A DRINK CONTAINING ALCOHOL?: NOT ASKED

## 2021-05-24 SDOH — HEALTH STABILITY: MENTAL HEALTH: HOW OFTEN DO YOU HAVE 6 OR MORE DRINKS ON ONE OCCASION?: NOT ASKED

## 2021-05-24 SDOH — HEALTH STABILITY: MENTAL HEALTH: HOW MANY STANDARD DRINKS CONTAINING ALCOHOL DO YOU HAVE ON A TYPICAL DAY?: NOT ASKED

## 2021-05-24 ASSESSMENT — ACTIVITIES OF DAILY LIVING (ADL): CURRENT_FUNCTION: NO ASSISTANCE NEEDED

## 2021-05-24 ASSESSMENT — MIFFLIN-ST. JEOR: SCORE: 1085.66

## 2021-05-24 ASSESSMENT — PATIENT HEALTH QUESTIONNAIRE - PHQ9
10. IF YOU CHECKED OFF ANY PROBLEMS, HOW DIFFICULT HAVE THESE PROBLEMS MADE IT FOR YOU TO DO YOUR WORK, TAKE CARE OF THINGS AT HOME, OR GET ALONG WITH OTHER PEOPLE: NOT DIFFICULT AT ALL
SUM OF ALL RESPONSES TO PHQ QUESTIONS 1-9: 3
SUM OF ALL RESPONSES TO PHQ QUESTIONS 1-9: 3

## 2021-05-24 NOTE — PROGRESS NOTES
"[facility]    SUBJECTIVE    CC: Denia Dailey is an 66 year old woman who presents for preventive health visit.     Patient has been advised of split billing requirements and indicates understanding: Yes  Healthy Habits:     In general, how would you rate your overall health?  Good    Frequency of exercise:  4-5 days/week    Duration of exercise:  30-45 minutes    Do you usually eat at least 4 servings of fruit and vegetables a day, include whole grains    & fiber and avoid regularly eating high fat or \"junk\" foods?  No    Taking medications regularly:  Yes    Medication side effects:  None    Ability to successfully perform activities of daily living:  No assistance needed    Home Safety:  No safety concerns identified    Hearing Impairment:  Difficulty following a conversation in a noisy restaurant or crowded room and difficulty understanding soft or whispered speech    In the past 6 months, have you been bothered by leaking of urine?  No    In general, how would you rate your overall mental or emotional health?  Good      PHQ-2 Total Score: 0    Additional concerns today:  Yes    Ability to successfully perform activities of daily living: Yes, no assistance needed  Home safety:  none identified   Hearing impairment: No    Today's PHQ-2 Score:   PHQ-2 ( 1999 Pfizer) 5/24/2021   Q1: Little interest or pleasure in doing things 0   Q2: Feeling down, depressed or hopeless 0   PHQ-2 Score 0   Q1: Little interest or pleasure in doing things Not at all   Q2: Feeling down, depressed or hopeless Not at all   PHQ-2 Score 0     Abuse: Current or Past (Physical, Sexual or Emotional) - No  Do you feel safe in your environment? Yes      Social History     Tobacco Use     Smoking status: Former Smoker     Smokeless tobacco: Never Used     Tobacco comment: quit age 33 - 1988, 1/2 PPD 8-10 yrs   Substance Use Topics     Alcohol use: Not Currently     Alcohol/week: 0.0 - 1.0 standard drinks     Comment: 1-2 " per month         Alcohol Use 5/24/2021   Prescreen: >3 drinks/day or >7 drinks/week? No       Reviewed orders with patient.  Reviewed health maintenance and updated orders accordingly - Yes    Breast Cancer Screening:  Any new diagnosis of family breast, ovarian, or bowel cancer? No    FSH-7: No flowsheet data found.  click delete button to remove this line now  Mammogram Screening: Recommended annual mammography  Pertinent mammograms are reviewed under the imaging tab.    History of abnormal Pap smear: NO - age 30-65 PAP every 5 years with negative HPV co-testing recommended  PAP / HPV 4/10/2013   PAP Neg     Reviewed and updated as needed this visit by clinical staff  Tobacco  Allergies  Meds            Chronic cough - many times - dry tickle - some reflux - tried flonase/breo - uses cough suppressant at night - using omeprazole 20 mg - has seen pulmonary with negative work up - failed H1's - CT Chest negative except hiatal hernia and calcified granulomas - swallow study negative except gastroparesis - hx of h pylori/c diff - CT abd/pelvis negative    Weight loss - early satiety  Wt Readings from Last 4 Encounters:   05/24/21 54 kg (119 lb)   07/20/19 60.3 kg (133 lb)   09/19/18 62.1 kg (137 lb)   06/13/18 64.4 kg (142 lb)     Follows with Dr Kirk annually    Rt leg numbness with sleeping on right side - no other issues    Ocular migraines - followed by ophthalmology    Health Maintenance     Colonoscopy:  approx 2 yrs ago, had to change to virtual/required ambu - due 9/2024   FIT:  given              Mammogram:  due              PAP:  Per Dr Kirk   DEXA:  Due 6/22    Health Maintenance Due   Topic Date Due     FALL RISK ASSESSMENT  Never done       Current Problem List    Patient Active Problem List   Diagnosis     Thoracic or lumbosacral neuritis or radiculitis, unspecified     Lumbago     Advanced directives, counseling/discussion     GERD (gastroesophageal reflux disease)     Chronic cough     Colitis  due to Clostridium difficile     Cervical cancer screening     Gastroparesis       Past Medical History    Past Medical History:   Diagnosis Date     Chronic cough      Gastroparesis 2019    mild/moderate - [facility]     GERD (gastroesophageal reflux disease)      Thoracic or lumbosacral neuritis or radiculitis, unspecified 5/2/2007    Disc Herniation at L4-5, L5-S1       Past Surgical History    Past Surgical History:   Procedure Laterality Date     C CLOSED RX NOSE/JAW FRAC+WIRES  1966     C DISKECTOMY,PERCUTANEOUS LUMBAR  07/10/2007    L5-S1 with nerve root decompression     COLONOSCOPY  7/09, 9/19    Diverticulosis, Ct Colonography, due 5 yrs     ESOPHAGOSCOPY, GASTROSCOPY, DUODENOSCOPY (EGD), COMBINED N/A 03/15/2016    chronic gastritis     ORTHOPEDIC SURGERY Right 2009    bunnionectomy     UPPER GI ENDOSCOPY  03/2016    Erythematous mucosa in the greater curvature.        Current Medications    Current Outpatient Medications   Medication Sig Dispense Refill     BREO ELLIPTA 200-25 MCG/INH Inhaler INHALE ONE PUFF BY MOUTH ONCE DAILY 60 each 9     CALCIUM + D 600-200 MG-UNIT OR TABS TWICE A DAY       Cholecalciferol (VITAMIN D) 2000 UNITS tablet Take 2,000 Units by mouth daily 100 tablet 3     magnesium 250 MG tablet Take 1 tablet by mouth daily. 100 tablet 3     omeprazole (PRILOSEC) 40 MG DR capsule Take 1 capsule (40 mg) by mouth daily 90 capsule 3       Allergies    Allergies   Allergen Reactions     Vioxx      stomach upset     Nickel Rash     Accompanied by itching and swelling       Immunizations    Immunization History   Administered Date(s) Administered     COVID-19,PF,Pfizer 12/29/2020, 01/15/2021     HepB-Adult 01/01/1991, 02/01/1992, 06/01/1992     Influenza (IIV3) PF 10/01/2013     Influenza Intranasal Vaccine 4 valent 10/25/2017, 10/15/2020     Pneumo Conj 13-V (2010&after) 05/24/2021     TDAP Vaccine (Adacel) 02/19/2018     Tetanus 03/01/2006     Zoster vaccine recombinant adjuvanted (SHINGRIX)  05/24/2021       Family History    Family History   Problem Relation Age of Onset     Cancer Father         renal cell CA     Breast Cancer Maternal Aunt      Coronary Artery Disease Maternal Grandmother      Coronary Artery Disease Paternal Grandmother      Coronary Artery Disease Paternal Uncle        Social History    Social History     Socioeconomic History     Marital status:      Spouse name: Haroldo     Number of children: 0     Years of education: 16     Highest education level: Not on file   Occupational History     Occupation: NURSE     Employer: [employer],[address]   Social Needs     Financial resource strain: Not on file     Food insecurity     Worry: Not on file     Inability: Not on file     Transportation needs     Medical: Not on file     Non-medical: Not on file   Tobacco Use     Smoking status: Former Smoker     Smokeless tobacco: Never Used     Tobacco comment: quit age 33 - 1988, 1/2 PPD 8-10 yrs   Substance and Sexual Activity     Alcohol use: Not Currently     Alcohol/week: 0.0 - 1.0 standard drinks     Comment: 1-2 per month     Drug use: No     Sexual activity: Never   Lifestyle     Physical activity     Days per week: Not on file     Minutes per session: Not on file     Stress: Not on file   Relationships     Social connections     Talks on phone: Not on file     Gets together: Not on file     Attends Jain service: Not on file     Active member of club or organization: Not on file     Attends meetings of clubs or organizations: Not on file     Relationship status: Not on file     Intimate partner violence     Fear of current or ex partner: Not on file     Emotionally abused: Not on file     Physically abused: Not on file     Forced sexual activity: Not on file   Other Topics Concern      Service Not Asked     Blood Transfusions Not Asked     Caffeine Concern Not Asked     Occupational Exposure Not Asked     Hobby Hazards Not Asked     Sleep Concern  "Not Asked     Stress Concern Not Asked     Weight Concern Not Asked     Special Diet Not Asked     Back Care Not Asked     Exercise Not Asked     Bike Helmet Not Asked     Seat Belt Not Asked     Self-Exams Not Asked     Parent/sibling w/ CABG, MI or angioplasty before 65F 55M? Not Asked   Social History Narrative     Not on file       ROS    CONSTITUTIONAL: NEGATIVE for fever, chills, change in weight  INTEGUMENTARY/SKIN: NEGATIVE for worrisome rashes, moles or lesions  EYES: NEGATIVE for vision changes or irritation  ENT/MOUTH: NEGATIVE for ear, mouth and throat problems  RESP: NEGATIVE for significant cough or SOB  CV: NEGATIVE for chest pain, palpitations or peripheral edema  GI: NEGATIVE for nausea, abdominal pain, heartburn, or change in bowel habits  : NEGATIVE for frequency, dysuria, or hematuria  MUSCULOSKELETAL: NEGATIVE for significant arthralgias or myalgia  NEURO: NEGATIVE for weakness, dizziness or paresthesias  ENDOCRINE: NEGATIVE for temperature intolerance, skin/hair changes  HEME: NEGATIVE for bleeding problems  PSYCHIATRIC: NEGATIVE for changes in mood or affect    OBJECTIVE    /72   Pulse 68   Temp 98.4  F (36.9  C)   Resp 20   Ht 1.651 m (5' 5\")   Wt 54 kg (119 lb)   LMP 08/08/2009   SpO2 99%   Breastfeeding No   BMI 19.80 kg/m    EXAM:  GENERAL: healthy, alert and no distress  EYES: Eyes grossly normal to inspection, PERRL and conjunctivae and sclerae normal  HENT: ear canals and TM's normal, nose and mouth without ulcers or lesions  NECK: no adenopathy, no asymmetry, masses, or scars and thyroid normal to palpation - rt anterior mid LAD 1 cm  RESP: lungs clear to auscultation - no rales, rhonchi or wheezes  BREAST: per Dr Kirk  CV: regular rate and rhythm, normal S1 S2, no S3 or S4, no murmur, click or rub, no peripheral edema and peripheral pulses strong  ABDOMEN: soft, nontender, no hepatosplenomegaly, no masses and bowel sounds normal   (female): per DR Kirk  MS: no " gross musculoskeletal defects noted, no edema  SKIN: no suspicious lesions or rashes  NEURO: Normal strength and tone, mentation intact and speech normal  PSYCH: mentation appears normal, affect normal/bright  LYMPH: no cervical, supraclavicular, axillary, or inguinal adenopathy    DIAGNOSTICS/PROCEDURES    Pending    ASSESSMENT      ICD-10-CM    1. Routine general medical examination at a health care facility  Z00.00 REVIEW OF HEALTH MAINTENANCE PROTOCOL ORDERS   2. Early satiety  R68.81 CT Chest Abdomen Pelvis w/o & w Contrast     REVIEW OF HEALTH MAINTENANCE PROTOCOL ORDERS   3. Loss of weight  R63.4 CT Chest Abdomen Pelvis w/o & w Contrast     REVIEW OF HEALTH MAINTENANCE PROTOCOL ORDERS   4. Chronic cough  R05 OTOLARYNGOLOGY REFERRAL     CT Chest Abdomen Pelvis w/o & w Contrast     REVIEW OF HEALTH MAINTENANCE PROTOCOL ORDERS     omeprazole (PRILOSEC) 40 MG DR capsule   5. Gastroesophageal reflux disease with esophagitis without hemorrhage  K21.00 REVIEW OF HEALTH MAINTENANCE PROTOCOL ORDERS     omeprazole (PRILOSEC) 40 MG DR capsule   6. Gastroparesis  K31.84 REVIEW OF HEALTH MAINTENANCE PROTOCOL ORDERS     omeprazole (PRILOSEC) 40 MG DR capsule   7. Adenopathy, cervical  R59.0 CT Soft Tissue Neck w Contrast   8. Screen for colon cancer  Z12.11 REVIEW OF HEALTH MAINTENANCE PROTOCOL ORDERS   9. Encounter for screening mammogram for malignant neoplasm of breast  Z12.31 REVIEW OF HEALTH MAINTENANCE PROTOCOL ORDERS   10. Screening for osteoporosis  Z13.820 REVIEW OF HEALTH MAINTENANCE PROTOCOL ORDERS   11. Medication monitoring encounter  Z51.81 REVIEW OF HEALTH MAINTENANCE PROTOCOL ORDERS   12. Need for pneumococcal vaccination  Z23 PNEUMOCOCCAL CONJ VACCINE 13 VALENT IM   13. Need for shingles vaccine  Z23 ZOSTER VACCINE RECOMBINANT ADJUVANTED (SHINGRIX)       PLAN    Discussed treatment/modality options, including risk and benefits, she desires:    advised alcohol consumption 1oz per day or less, advised  "aspirin 81 mg po daily, advised 1 multivitamin per day, advised calcium 1755-6877 mg/d and Vitamin D 800-1200 IU/d, advised dentist every 6 months, advised diet and exercise, advised opthalmologist every 1-2 years, advised self breast exam q month, further health care maintenance, further lab(s), immunization(s), medication change(s), medication refill(s) and observation    All diagnosis above reviewed and noted above, otherwise stable.      See [facility] orders for further details.      1) med refills, increased omeprazole to 40 mg daily    2) labs pending    3) prevnar, shingrix    Return in about 2 months (around 7/24/2021) for immunization.    Health Maintenance Due   Topic Date Due     FALL RISK ASSESSMENT  Never done       COUNSELING    Reviewed preventive health counseling, as reflected in patient instructions    BP Readings from Last 1 Encounters:   05/24/21 124/72     Estimated body mass index is 19.8 kg/m  as calculated from the following:    Height as of this encounter: 1.651 m (5' 5\").    Weight as of this encounter: 54 kg (119 lb).           reports that she has quit smoking. She has never used smokeless tobacco.           Pa Montana MD, FA[facility]  [address] [place] [id number]  [email or web address]   Office: [phone]  Fax: [phone]  Pager: [phone]       "

## 2021-05-25 ENCOUNTER — MYC MEDICAL ADVICE (OUTPATIENT)
Dept: FAMILY MEDICINE | Facility: CLINIC | Age: 66
End: 2021-05-25

## 2021-05-25 DIAGNOSIS — K21.00 GASTROESOPHAGEAL REFLUX DISEASE WITH ESOPHAGITIS: ICD-10-CM

## 2021-05-25 LAB
CREAT UR-MCNC: 24 MG/DL
MICROALBUMIN UR-MCNC: <5 MG/L
MICROALBUMIN/CREAT UR: NORMAL MG/G CR (ref 0–25)

## 2021-05-25 RX ORDER — LANSOPRAZOLE 30 MG/1
CAPSULE, DELAYED RELEASE ORAL
Qty: 90 CAPSULE | Refills: 0 | Status: SHIPPED | OUTPATIENT
Start: 2021-05-25 | End: 2021-08-30

## 2021-05-25 ASSESSMENT — PATIENT HEALTH QUESTIONNAIRE - PHQ9: SUM OF ALL RESPONSES TO PHQ QUESTIONS 1-9: 3

## 2021-05-25 NOTE — TELEPHONE ENCOUNTER
Please see my chart message below     Please review and advise     Thank you     Cristy Zepeda RN, BSN  [place] [facility]

## 2021-05-25 NOTE — TELEPHONE ENCOUNTER
Please call and med rec    Omeprazole 40 mg daily OR prevacid 30 mg daily - whatever gives best results

## 2021-05-25 NOTE — TELEPHONE ENCOUNTER
Called # below     Reviewed with pt and she stated she is on the prevacid 30 mg     Reviewed on the information below     Patient stated an understanding and agreed with plan.    Cristy Zepeda RN, BSN  [facility]

## 2021-05-26 ENCOUNTER — TELEPHONE (OUTPATIENT)
Dept: FAMILY MEDICINE | Facility: CLINIC | Age: 66
End: 2021-05-26

## 2021-05-28 ENCOUNTER — HOSPITAL ENCOUNTER (OUTPATIENT)
Dept: CT IMAGING | Facility: CLINIC | Age: 66
End: 2021-05-28
Attending: FAMILY MEDICINE
Payer: COMMERCIAL

## 2021-05-28 DIAGNOSIS — R68.81 EARLY SATIETY: ICD-10-CM

## 2021-05-28 DIAGNOSIS — R63.4 LOSS OF WEIGHT: ICD-10-CM

## 2021-05-28 DIAGNOSIS — R05.3 CHRONIC COUGH: ICD-10-CM

## 2021-05-28 DIAGNOSIS — R59.0 ADENOPATHY, CERVICAL: ICD-10-CM

## 2021-05-28 PROCEDURE — 70491 CT SOFT TISSUE NECK W/DYE: CPT

## 2021-05-28 PROCEDURE — 71260 CT THORAX DX C+: CPT

## 2021-05-28 PROCEDURE — 250N000011 HC RX IP 250 OP 636: Performed by: FAMILY MEDICINE

## 2021-05-28 PROCEDURE — 250N000009 HC RX 250: Performed by: FAMILY MEDICINE

## 2021-05-28 RX ORDER — IOPAMIDOL 755 MG/ML
500 INJECTION, SOLUTION INTRAVASCULAR ONCE
Status: COMPLETED | OUTPATIENT
Start: 2021-05-28 | End: 2021-05-28

## 2021-05-28 RX ADMIN — IOPAMIDOL 125 ML: 755 INJECTION, SOLUTION INTRAVENOUS at 11:08

## 2021-05-28 RX ADMIN — SODIUM CHLORIDE 65 ML: 9 INJECTION, SOLUTION INTRAVENOUS at 11:08

## 2021-06-01 ENCOUNTER — TELEPHONE (OUTPATIENT)
Dept: FAMILY MEDICINE | Facility: CLINIC | Age: 66
End: 2021-06-01

## 2021-06-01 NOTE — TELEPHONE ENCOUNTER
Pt calls back regarding her CT scan results and lab results.   She states she already saw the LIA messages.

## 2021-06-21 ENCOUNTER — TRANSFERRED RECORDS (OUTPATIENT)
Dept: HEALTH INFORMATION MANAGEMENT | Facility: CLINIC | Age: 66
End: 2021-06-21

## 2021-07-21 ENCOUNTER — HOSPITAL ENCOUNTER (OUTPATIENT)
Dept: MAMMOGRAPHY | Facility: CLINIC | Age: 66
Discharge: HOME | End: 2021-07-21
Attending: OBSTETRICS & GYNECOLOGY | Admitting: OBSTETRICS & GYNECOLOGY
Payer: COMMERCIAL

## 2021-07-21 DIAGNOSIS — Z12.31 VISIT FOR SCREENING MAMMOGRAM: ICD-10-CM

## 2021-07-21 PROCEDURE — 77063 BREAST TOMOSYNTHESIS BI: CPT

## 2021-08-02 ENCOUNTER — MYC MEDICAL ADVICE (OUTPATIENT)
Dept: FAMILY MEDICINE | Facility: CLINIC | Age: 66
End: 2021-08-02

## 2021-08-02 DIAGNOSIS — R01.1 HEART MURMUR: Primary | ICD-10-CM

## 2021-08-03 NOTE — TELEPHONE ENCOUNTER
Please get echo    Please get CBC    Granuloma's are calcified, we typically don't have to do anything with    Await follow up    Next 5 appointments (look out 90 days)    Aug 30, 2021  2:00 PM  SHORT with Pa Montana MD  [facility] ([facility] ) [address] [place] [id number] [phone]

## 2021-08-03 NOTE — TELEPHONE ENCOUNTER
Please see my chart message below     Please review and advise     Thank you     Cristy Zepeda RN, BSN  [place] [facility]

## 2021-08-04 NOTE — TELEPHONE ENCOUNTER
Echo and CBC ordered.     Mychart sent to patient.     Lary Balderas RN  [facility]

## 2021-08-06 ENCOUNTER — LAB (OUTPATIENT)
Dept: LAB | Facility: CLINIC | Age: 66
End: 2021-08-06
Payer: COMMERCIAL

## 2021-08-06 DIAGNOSIS — R01.1 HEART MURMUR: ICD-10-CM

## 2021-08-06 LAB
ERYTHROCYTE [DISTWIDTH] IN BLOOD BY AUTOMATED COUNT: 12.4 % (ref 10–15)
HCT VFR BLD AUTO: 41.4 % (ref 35–47)
HGB BLD-MCNC: 14.3 G/DL (ref 11.7–15.7)
MCH RBC QN AUTO: 30.8 PG (ref 26.5–33)
MCHC RBC AUTO-ENTMCNC: 34.5 G/DL (ref 31.5–36.5)
MCV RBC AUTO: 89 FL (ref 78–100)
PLATELET # BLD AUTO: 206 10E3/UL (ref 150–450)
RBC # BLD AUTO: 4.64 10E6/UL (ref 3.8–5.2)
WBC # BLD AUTO: 3.5 10E3/UL (ref 4–11)

## 2021-08-06 PROCEDURE — 85027 COMPLETE CBC AUTOMATED: CPT

## 2021-08-06 PROCEDURE — 36416 COLLJ CAPILLARY BLOOD SPEC: CPT

## 2021-08-07 ENCOUNTER — MYC MEDICAL ADVICE (OUTPATIENT)
Dept: FAMILY MEDICINE | Facility: CLINIC | Age: 66
End: 2021-08-07

## 2021-08-07 DIAGNOSIS — I34.0 MITRAL VALVE INSUFFICIENCY, UNSPECIFIED ETIOLOGY: Primary | ICD-10-CM

## 2021-08-09 NOTE — TELEPHONE ENCOUNTER
My chart message sent       Cristy Zepeda RN, BSN  [facility]

## 2021-08-10 ENCOUNTER — HOSPITAL ENCOUNTER (OUTPATIENT)
Dept: CARDIOLOGY | Facility: CLINIC | Age: 66
Discharge: HOME | End: 2021-08-10
Attending: FAMILY MEDICINE | Admitting: FAMILY MEDICINE
Payer: COMMERCIAL

## 2021-08-10 DIAGNOSIS — R01.1 HEART MURMUR: ICD-10-CM

## 2021-08-10 LAB — LVEF ECHO: NORMAL

## 2021-08-10 PROCEDURE — 93306 TTE W/DOPPLER COMPLETE: CPT | Mod: 26 | Performed by: INTERNAL MEDICINE

## 2021-08-10 PROCEDURE — 93306 TTE W/DOPPLER COMPLETE: CPT

## 2021-08-11 NOTE — RESULT ENCOUNTER NOTE
Triage: please call pt and advise of results/recommendations:     Denia  Here are your recent results.  Your echocardiogram shows some abnormality in the mitral valve as the cause for your heart murmur.  Due to this finding, I am going to place a referral to cardiology.  They will determine if treatment or observation is recommended.    If you have any questions please do not hesitate to contact our office via phone ([phone]) or [employer].    Amisha Giraldo, MS, PA-C (covering for Dr. Montana)  [facility]

## 2021-08-11 NOTE — RESULT ENCOUNTER NOTE
Denia  Here are your recent results.  They are stable/normal.      Plan to follow up with Dr. Montana as planned on 8/30.    If you have any questions please do not hesitate to contact our office via phone ([phone]) or MyChart.    Amisha Giraldo, MS, PA-C  [facility]

## 2021-08-30 ENCOUNTER — OFFICE VISIT (OUTPATIENT)
Dept: FAMILY MEDICINE | Facility: CLINIC | Age: 66
End: 2021-08-30
Payer: COMMERCIAL

## 2021-08-30 VITALS
WEIGHT: 114.8 LBS | HEART RATE: 104 BPM | OXYGEN SATURATION: 100 % | DIASTOLIC BLOOD PRESSURE: 74 MMHG | HEIGHT: 65 IN | TEMPERATURE: 97.9 F | SYSTOLIC BLOOD PRESSURE: 122 MMHG | BODY MASS INDEX: 19.13 KG/M2 | RESPIRATION RATE: 20 BRPM

## 2021-08-30 DIAGNOSIS — K31.84 GASTROPARESIS: ICD-10-CM

## 2021-08-30 DIAGNOSIS — Z51.81 MEDICATION MONITORING ENCOUNTER: ICD-10-CM

## 2021-08-30 DIAGNOSIS — R63.4 WEIGHT LOSS: Primary | ICD-10-CM

## 2021-08-30 DIAGNOSIS — I34.0 MITRAL VALVE INSUFFICIENCY, UNSPECIFIED ETIOLOGY: ICD-10-CM

## 2021-08-30 DIAGNOSIS — K21.00 GASTROESOPHAGEAL REFLUX DISEASE WITH ESOPHAGITIS WITHOUT HEMORRHAGE: ICD-10-CM

## 2021-08-30 DIAGNOSIS — R68.81 EARLY SATIETY: ICD-10-CM

## 2021-08-30 PROCEDURE — 99214 OFFICE O/P EST MOD 30 MIN: CPT | Mod: 25 | Performed by: FAMILY MEDICINE

## 2021-08-30 PROCEDURE — 90471 IMMUNIZATION ADMIN: CPT | Performed by: FAMILY MEDICINE

## 2021-08-30 PROCEDURE — 90750 HZV VACC RECOMBINANT IM: CPT | Performed by: FAMILY MEDICINE

## 2021-08-30 RX ORDER — CYANOCOBALAMIN (VITAMIN B-12) 500 MCG
TABLET ORAL DAILY
COMMUNITY
End: 2021-12-27

## 2021-08-30 RX ORDER — ASCORBIC ACID 100 MG
1 TABLET,CHEWABLE ORAL DAILY
COMMUNITY
End: 2021-09-15

## 2021-08-30 RX ORDER — CETIRIZINE HYDROCHLORIDE 10 MG/1
10 TABLET ORAL DAILY
COMMUNITY
Start: 2021-08-22 | End: 2021-09-23

## 2021-08-30 RX ORDER — OMEPRAZOLE 40 MG/1
40 CAPSULE, DELAYED RELEASE ORAL DAILY
Qty: 180 CAPSULE | Refills: 3 | Status: SHIPPED | OUTPATIENT
Start: 2021-08-30 | End: 2022-10-10

## 2021-08-30 ASSESSMENT — MIFFLIN-ST. JEOR: SCORE: 1066.61

## 2021-08-30 ASSESSMENT — PATIENT HEALTH QUESTIONNAIRE - PHQ9
SUM OF ALL RESPONSES TO PHQ QUESTIONS 1-9: 0
10. IF YOU CHECKED OFF ANY PROBLEMS, HOW DIFFICULT HAVE THESE PROBLEMS MADE IT FOR YOU TO DO YOUR WORK, TAKE CARE OF THINGS AT HOME, OR GET ALONG WITH OTHER PEOPLE: NOT DIFFICULT AT ALL
SUM OF ALL RESPONSES TO PHQ QUESTIONS 1-9: 0

## 2021-08-30 ASSESSMENT — ANXIETY QUESTIONNAIRES
1. FEELING NERVOUS, ANXIOUS, OR ON EDGE: NOT AT ALL
7. FEELING AFRAID AS IF SOMETHING AWFUL MIGHT HAPPEN: NOT AT ALL
GAD7 TOTAL SCORE: 0
3. WORRYING TOO MUCH ABOUT DIFFERENT THINGS: NOT AT ALL
5. BEING SO RESTLESS THAT IT IS HARD TO SIT STILL: NOT AT ALL
4. TROUBLE RELAXING: NOT AT ALL
GAD7 TOTAL SCORE: 0
2. NOT BEING ABLE TO STOP OR CONTROL WORRYING: NOT AT ALL
6. BECOMING EASILY ANNOYED OR IRRITABLE: NOT AT ALL
8. IF YOU CHECKED OFF ANY PROBLEMS, HOW DIFFICULT HAVE THESE MADE IT FOR YOU TO DO YOUR WORK, TAKE CARE OF THINGS AT HOME, OR GET ALONG WITH OTHER PEOPLE?: NOT DIFFICULT AT ALL
7. FEELING AFRAID AS IF SOMETHING AWFUL MIGHT HAPPEN: NOT AT ALL
GAD7 TOTAL SCORE: 0

## 2021-08-30 NOTE — PROGRESS NOTES
Assessment & Plan       ICD-10-CM    1. Weight loss  R63.4 Adult Gastro Ref - Procedure Only     omeprazole (PRILOSEC) 40 MG DR capsule   2. Early satiety  R68.81 Adult Gastro Ref - Procedure Only     omeprazole (PRILOSEC) 40 MG DR capsule   3. Gastroesophageal reflux disease with esophagitis without hemorrhage  K21.00 omeprazole (PRILOSEC) 40 MG DR capsule     ZOSTER VACCINE RECOMBINANT ADJUVANTED IM NJX   4. Gastroparesis  K31.84 omeprazole (PRILOSEC) 40 MG DR capsule   5. Mitral valve insufficiency, unspecified etiology  I34.0    6. Medication monitoring encounter  Z51.81        Discussed treatment/modality options, including risk and benefits, she desires:    1) EGD    2) omeprazole refill    3) close follow up    4) Consider GI follow up    5) shingrix today, COVID 9/15    All diagnosis above reviewed and noted above, otherwise stable.      See [employer] orders for further details.      Return in about 1 month (around 9/30/2021) for Medication Recheck Visit, Follow Up Chronic, Follow Up Acute.    No LOS data to display    Doing chart review, history and exam, documentation and further activities as noted.           Pa Montana MD, FAAFP     [facility]  [address] [place], [place] [email or web address]   Office: [phone]  Fax: [phone]  Pager: [phone]       Marilia Loza is a 66 year old who presents for the following health issues     History of Present Illness       She eats 2-3 servings of fruits and vegetables daily.She consumes 1 sweetened beverage(s) daily.She exercises with enough effort to increase her heart rate 30 to 60 minutes per day.  She exercises with enough effort to increase her heart rate 4 days per week. She is missing 3 dose(s) of medications per week.  She is not taking prescribed medications regularly due to remembering to take.     Answers for HPI/ROS submitted by the patient  on 8/30/2021  If you checked off any problems, how difficult have these problems made it for you to do your work, take care of things at home, or get along with other people?: Not difficult at all  PHQ9 TOTAL SCORE: 0  SHANI 7 TOTAL SCORE: 0    Gets full easily, appetite decreased, 1/2 portions, weight is declining, max weight was 160, losing about 10 lbs per year - no burping, no belching, no heartburn, no change with increased omeprazole, noticing clothes feel big, seen GI (Dr Ramirez/Soys), H Pylori in 2016, C Diff in 2018, salmonella afterwards, CT/Video swallow in 2018 negative, CT colonography negative except diverticulosis, delayed gastric emptying time - minimal, celiac negative - works 12 hrs shift at [employer] - nutrition consult was negative/unrevealing    Wt Readings from Last 4 Encounters:   08/30/21 52.1 kg (114 lb 12.8 oz)   05/24/21 54 kg (119 lb)   07/20/19 60.3 kg (133 lb)   09/19/18 62.1 kg (137 lb)     Work up to date    CT Abd/Chest/Pelvis    IMPRESSION:  1.  Evidence of old granulomatous disease. Lungs are otherwise clear.  No evidence of a mass in the abdomen or pelvis.     2.  Benign left renal cortical cyst. No specific follow-up suggested.  Tiny nonobstructing collecting system stone noted within each kidney.  No hydronephrosis.     3.  Mild constipation. No bowel obstruction, diverticulitis or  appendicitis.     CT neck soft tissue - ENT consult was negative    IMPRESSION: Negative CT scan of the neck. No evidence for  lymphadenopathy or any focal neck masses or abnormalities at the area  of clinical concern.    Mammogram - Dr Kirk    Negative     Review of Systems   CONSTITUTIONAL: NEGATIVE for fever, chills, change in weight  INTEGUMENTARY/SKIN: NEGATIVE for worrisome rashes, moles or lesions  EYES: NEGATIVE for vision changes or irritation  ENT/MOUTH: NEGATIVE for ear, mouth and throat problems  RESP: NEGATIVE for significant cough or SOB  CV: NEGATIVE for chest pain, palpitations or  "peripheral edema  GI: NEGATIVE for nausea, abdominal pain, heartburn, or change in bowel habits  : NEGATIVE for frequency, dysuria, or hematuria  MUSCULOSKELETAL: NEGATIVE for significant arthralgias or myalgia  NEURO: NEGATIVE for weakness, dizziness or paresthesias  ENDOCRINE: NEGATIVE for temperature intolerance, skin/hair changes  HEME: NEGATIVE for bleeding problems  PSYCHIATRIC: NEGATIVE for changes in mood or affect      Objective    /74   Pulse 104   Temp 97.9  F (36.6  C)   Resp 20   Ht 1.651 m (5' 5\")   Wt 52.1 kg (114 lb 12.8 oz)   LMP 08/08/2009   SpO2 100%   BMI 19.10 kg/m    Body mass index is 19.1 kg/m .  Physical Exam   GENERAL: healthy, alert and no distress  EYES: Eyes grossly normal to inspection, PERRL and conjunctivae and sclerae normal  HENT: ear canals and TM's normal, nose and mouth without ulcers or lesions  NECK: no adenopathy, no asymmetry, masses, or scars and thyroid normal to palpation  RESP: lungs clear to auscultation - no rales, rhonchi or wheezes  CV: regular rate and rhythm, normal S1 S2, no S3 or S4, no murmur, click or rub, no peripheral edema and peripheral pulses strong  ABDOMEN: soft, nontender, no hepatosplenomegaly, no masses and bowel sounds normal  MS: no gross musculoskeletal defects noted, no edema  SKIN: no suspicious lesions or rashes  NEURO: Normal strength and tone, mentation intact and speech normal  PSYCH: mentation appears normal, affect normal/bright    Reviewed labs/imaging          "

## 2021-08-31 ASSESSMENT — ANXIETY QUESTIONNAIRES: GAD7 TOTAL SCORE: 0

## 2021-08-31 ASSESSMENT — PATIENT HEALTH QUESTIONNAIRE - PHQ9: SUM OF ALL RESPONSES TO PHQ QUESTIONS 1-9: 0

## 2021-09-07 DIAGNOSIS — Z11.59 ENCOUNTER FOR SCREENING FOR OTHER VIRAL DISEASES: ICD-10-CM

## 2021-09-10 ENCOUNTER — LAB (OUTPATIENT)
Dept: LAB | Facility: CLINIC | Age: 66
End: 2021-09-10
Attending: INTERNAL MEDICINE
Payer: COMMERCIAL

## 2021-09-10 DIAGNOSIS — Z11.59 ENCOUNTER FOR SCREENING FOR OTHER VIRAL DISEASES: ICD-10-CM

## 2021-09-10 PROCEDURE — U0003 INFECTIOUS AGENT DETECTION BY NUCLEIC ACID (DNA OR RNA); SEVERE ACUTE RESPIRATORY SYNDROME CORONAVIRUS 2 (SARS-COV-2) (CORONAVIRUS DISEASE [COVID-19]), AMPLIFIED PROBE TECHNIQUE, MAKING USE OF HIGH THROUGHPUT TECHNOLOGIES AS DESCRIBED BY CMS-2020-01-R: HCPCS

## 2021-09-11 LAB — SARS-COV-2 RNA RESP QL NAA+PROBE: NEGATIVE

## 2021-09-14 ENCOUNTER — HOSPITAL ENCOUNTER (OUTPATIENT)
Facility: CLINIC | Age: 66
Discharge: HOME | End: 2021-09-14
Attending: INTERNAL MEDICINE | Admitting: INTERNAL MEDICINE
Payer: COMMERCIAL

## 2021-09-14 VITALS
RESPIRATION RATE: 18 BRPM | BODY MASS INDEX: 19.99 KG/M2 | HEIGHT: 65 IN | TEMPERATURE: 96.9 F | OXYGEN SATURATION: 100 % | WEIGHT: 120 LBS | SYSTOLIC BLOOD PRESSURE: 109 MMHG | DIASTOLIC BLOOD PRESSURE: 71 MMHG | HEART RATE: 80 BPM

## 2021-09-14 LAB — UPPER GI ENDOSCOPY: NORMAL

## 2021-09-14 PROCEDURE — 250N000011 HC RX IP 250 OP 636: Performed by: INTERNAL MEDICINE

## 2021-09-14 PROCEDURE — 88305 TISSUE EXAM BY PATHOLOGIST: CPT | Mod: 26 | Performed by: PATHOLOGY

## 2021-09-14 PROCEDURE — 999N000099 HC STATISTIC MODERATE SEDATION < 10 MIN: Performed by: INTERNAL MEDICINE

## 2021-09-14 PROCEDURE — 250N000009 HC RX 250: Performed by: INTERNAL MEDICINE

## 2021-09-14 PROCEDURE — 88342 IMHCHEM/IMCYTCHM 1ST ANTB: CPT | Mod: 26 | Performed by: PATHOLOGY

## 2021-09-14 PROCEDURE — 43239 EGD BIOPSY SINGLE/MULTIPLE: CPT | Performed by: INTERNAL MEDICINE

## 2021-09-14 PROCEDURE — 88342 IMHCHEM/IMCYTCHM 1ST ANTB: CPT | Mod: TC | Performed by: INTERNAL MEDICINE

## 2021-09-14 PROCEDURE — 88341 IMHCHEM/IMCYTCHM EA ADD ANTB: CPT | Mod: 26 | Performed by: PATHOLOGY

## 2021-09-14 RX ORDER — EPINEPHRINE 1 MG/ML
0.1 INJECTION, SOLUTION INTRAMUSCULAR; SUBCUTANEOUS
Status: DISCONTINUED | OUTPATIENT
Start: 2021-09-14 | End: 2021-09-14 | Stop reason: HOSPADM

## 2021-09-14 RX ORDER — FLUMAZENIL 0.1 MG/ML
0.2 INJECTION, SOLUTION INTRAVENOUS
Status: DISCONTINUED | OUTPATIENT
Start: 2021-09-14 | End: 2021-09-14 | Stop reason: HOSPADM

## 2021-09-14 RX ORDER — LIDOCAINE 40 MG/G
CREAM TOPICAL
Status: DISCONTINUED | OUTPATIENT
Start: 2021-09-14 | End: 2021-09-14 | Stop reason: HOSPADM

## 2021-09-14 RX ORDER — NALOXONE HYDROCHLORIDE 0.4 MG/ML
0.4 INJECTION, SOLUTION INTRAMUSCULAR; INTRAVENOUS; SUBCUTANEOUS
Status: DISCONTINUED | OUTPATIENT
Start: 2021-09-14 | End: 2021-09-14 | Stop reason: HOSPADM

## 2021-09-14 RX ORDER — FENTANYL CITRATE 50 UG/ML
25-50 INJECTION, SOLUTION INTRAMUSCULAR; INTRAVENOUS
Status: COMPLETED | OUTPATIENT
Start: 2021-09-14 | End: 2021-09-14

## 2021-09-14 RX ORDER — NALOXONE HYDROCHLORIDE 0.4 MG/ML
0.2 INJECTION, SOLUTION INTRAMUSCULAR; INTRAVENOUS; SUBCUTANEOUS
Status: DISCONTINUED | OUTPATIENT
Start: 2021-09-14 | End: 2021-09-14 | Stop reason: HOSPADM

## 2021-09-14 RX ORDER — ATROPINE SULFATE 0.4 MG/ML
0.4 AMPUL (ML) INJECTION
Status: DISCONTINUED | OUTPATIENT
Start: 2021-09-14 | End: 2021-09-14 | Stop reason: HOSPADM

## 2021-09-14 RX ORDER — ONDANSETRON 4 MG/1
4 TABLET, ORALLY DISINTEGRATING ORAL EVERY 6 HOURS PRN
Status: DISCONTINUED | OUTPATIENT
Start: 2021-09-14 | End: 2021-09-14 | Stop reason: HOSPADM

## 2021-09-14 RX ORDER — SIMETHICONE 40MG/0.6ML
133 SUSPENSION, DROPS(FINAL DOSAGE FORM)(ML) ORAL
Status: DISCONTINUED | OUTPATIENT
Start: 2021-09-14 | End: 2021-09-14 | Stop reason: HOSPADM

## 2021-09-14 RX ORDER — FENTANYL CITRATE 50 UG/ML
25 INJECTION, SOLUTION INTRAMUSCULAR; INTRAVENOUS
Status: DISCONTINUED | OUTPATIENT
Start: 2021-09-14 | End: 2021-09-14 | Stop reason: HOSPADM

## 2021-09-14 RX ORDER — ONDANSETRON 2 MG/ML
4 INJECTION INTRAMUSCULAR; INTRAVENOUS EVERY 6 HOURS PRN
Status: DISCONTINUED | OUTPATIENT
Start: 2021-09-14 | End: 2021-09-14 | Stop reason: HOSPADM

## 2021-09-14 RX ORDER — PROCHLORPERAZINE MALEATE 5 MG
5 TABLET ORAL EVERY 6 HOURS PRN
Status: DISCONTINUED | OUTPATIENT
Start: 2021-09-14 | End: 2021-09-14 | Stop reason: HOSPADM

## 2021-09-14 RX ORDER — ONDANSETRON 2 MG/ML
4 INJECTION INTRAMUSCULAR; INTRAVENOUS
Status: COMPLETED | OUTPATIENT
Start: 2021-09-14 | End: 2021-09-14

## 2021-09-14 RX ADMIN — TOPICAL ANESTHETIC 0.5 ML: 200 SPRAY DENTAL; PERIODONTAL at 09:05

## 2021-09-14 RX ADMIN — FENTANYL CITRATE 50 MCG: 50 INJECTION, SOLUTION INTRAMUSCULAR; INTRAVENOUS at 09:04

## 2021-09-14 RX ADMIN — MIDAZOLAM 1 MG: 1 INJECTION INTRAMUSCULAR; INTRAVENOUS at 09:04

## 2021-09-14 RX ADMIN — ONDANSETRON 4 MG: 2 INJECTION INTRAMUSCULAR; INTRAVENOUS at 09:15

## 2021-09-14 ASSESSMENT — MIFFLIN-ST. JEOR: SCORE: 1090.2

## 2021-09-14 NOTE — H&P
Pre-Endoscopy History and Physical     Denia Dailey MRN# [id number]   YOB: 1955 Age: 66 year old     Date of Procedure: 9/14/2021  Primary care provider: Pa Montana  Type of Endoscopy: Colonoscopy with possible biopsy, possible polypectomy  Reason for Procedure: screen  Type of Anesthesia Anticipated: Conscious Sedation    HPI:    Denia is a 66 year old female who will be undergoing the above procedure.      A history and physical has been performed. The patient's medications and allergies have been reviewed. The risks and benefits of the procedure and the sedation options and risks were discussed with the patient.  All questions were answered and informed consent was obtained.      She denies a personal or family history of anesthesia complications or bleeding disorders.     Patient Active Problem List   Diagnosis     Thoracic or lumbosacral neuritis or radiculitis, unspecified     Lumbago     CARDIOVASCULAR SCREENING; LDL GOAL LESS THAN 130     Advanced directives, counseling/discussion     GERD (gastroesophageal reflux disease)     Chronic cough     Colitis due to Clostridium difficile     Cervical cancer screening     Gastroparesis     LPRD (laryngopharyngeal reflux disease)     Mitral valve regurgitation        Past Medical History:   Diagnosis Date     CARDIOVASCULAR SCREENING; LDL GOAL LESS THAN 130      Chronic cough      Gastroparesis 2019    mild/moderate - [facility]     GERD (gastroesophageal reflux disease)      LPRD (laryngopharyngeal reflux disease)     Dr Hernandez - cough     Nephrolithiasis 05/2021    bilateral, small     Thoracic or lumbosacral neuritis or radiculitis, unspecified 05/02/2007    Disc Herniation at L4-5, L5-S1        Past Surgical History:   Procedure Laterality Date     C CLOSED RX NOSE/JAW FRAC+WIRES  1966     C DISKECTOMY,PERCUTANEOUS LUMBAR  07/10/2007    L5-S1 with nerve root decompression     COLONOSCOPY  7/09, 9/19    Diverticulosis, Ct Colonography, due 5 yrs      ESOPHAGOSCOPY, GASTROSCOPY, DUODENOSCOPY (EGD), COMBINED N/A 03/15/2016    chronic gastritis     ORTHOPEDIC SURGERY Right 2009    bunnionectomy     UPPER GI ENDOSCOPY  03/2016    Erythematous mucosa in the greater curvature.        Social History     Tobacco Use     Smoking status: Former Smoker     Smokeless tobacco: Never Used     Tobacco comment: quit age 33 - 1988, 1/2 PPD 8-10 yrs   Substance Use Topics     Alcohol use: Not Currently     Alcohol/week: 0.0 - 1.0 standard drinks     Comment: 1-2 per month       Family History   Problem Relation Age of Onset     Cancer Father         renal cell CA     Breast Cancer Maternal Aunt      Coronary Artery Disease Maternal Grandmother      Coronary Artery Disease Paternal Grandmother      Coronary Artery Disease Paternal Uncle        Prior to Admission medications    Medication Sig Start Date End Date Taking? Authorizing Provider   Ascorbic Acid (VITAMIN C) 100 MG CHEW Take 1 chew tab by mouth daily    Reported, Patient   BREO ELLIPTA 200-25 MCG/INH Inhaler INHALE ONE PUFF BY MOUTH ONCE DAILY 4/16/21   Rory Haque MD   CALCIUM + D 600-200 MG-UNIT OR TABS TWICE A DAY    Reported, Patient   cetirizine (ZYRTEC) 10 MG tablet  8/22/21   Reported, Patient   Cholecalciferol (VITAMIN D) 2000 UNITS tablet Take 2,000 Units by mouth daily 2/24/16   Pa Montana MD   folic acid 0.8 MG CAPS     Reported, Patient   magnesium 250 MG tablet Take 1 tablet by mouth daily. 10/4/12   Weiler, Karen, MD   omeprazole (PRILOSEC) 40 MG DR capsule Take 1 capsule (40 mg) by mouth daily 8/30/21   Pa Montana MD       Allergies   Allergen Reactions     Vioxx      stomach upset     Nickel Rash     Accompanied by itching and swelling        REVIEW OF SYSTEMS:   5 point ROS negative except as noted above in HPI, including Gen., Resp., CV, GI &  system review.    PHYSICAL EXAM:   LMP 08/08/2009  Estimated body mass index is 19.1 kg/m  as calculated from the following:    Height as of 8/30/21:  "1.651 m (5' 5\").    Weight as of 8/30/21: 52.1 kg (114 lb 12.8 oz).   GENERAL APPEARANCE: alert, and oriented  MENTAL STATUS: alert  AIRWAY EXAM: Mallampatti Class I (visualization of the soft palate, fauces, uvula, anterior and posterior pillars)  RESP: lungs clear to auscultation - no rales, rhonchi or wheezes  CV: regular rates and rhythm  DIAGNOSTICS:    Not indicated    IMPRESSION   ASA Class 2 - Mild systemic disease    PLAN:   Plan for Colonoscopy with possible biopsy, possible polypectomy. We discussed the risks, benefits and alternatives and the patient wished to proceed.    The above has been forwarded to the consulting provider.      Signed Electronically by: Tomasz Andres MD  September 14, 2021          "

## 2021-09-14 NOTE — H&P
Pre-Endoscopy History and Physical     Denia Dailye MRN# [id number]   YOB: 1955 Age: 66 year old     Date of Procedure: 9/14/2021  Primary care provider: Pa Montana  Type of Endoscopy: Gastroscopy with possible biopsy, possible dilation  Reason for Procedure: reflux  Type of Anesthesia Anticipated: Conscious Sedation    HPI:    Denia is a 66 year old female who will be undergoing the above procedure.      A history and physical has been performed. The patient's medications and allergies have been reviewed. The risks and benefits of the procedure and the sedation options and risks were discussed with the patient.  All questions were answered and informed consent was obtained.      She denies a personal or family history of anesthesia complications or bleeding disorders.     Patient Active Problem List   Diagnosis     Thoracic or lumbosacral neuritis or radiculitis, unspecified     Lumbago     CARDIOVASCULAR SCREENING; LDL GOAL LESS THAN 130     Advanced directives, counseling/discussion     GERD (gastroesophageal reflux disease)     Chronic cough     Colitis due to Clostridium difficile     Cervical cancer screening     Gastroparesis     LPRD (laryngopharyngeal reflux disease)     Mitral valve regurgitation        Past Medical History:   Diagnosis Date     CARDIOVASCULAR SCREENING; LDL GOAL LESS THAN 130      Chronic cough      Gastroparesis 2019    mild/moderate - [facility]     GERD (gastroesophageal reflux disease)      LPRD (laryngopharyngeal reflux disease)     Dr Hernandez - cough     Nephrolithiasis 05/2021    bilateral, small     Thoracic or lumbosacral neuritis or radiculitis, unspecified 05/02/2007    Disc Herniation at L4-5, L5-S1        Past Surgical History:   Procedure Laterality Date     C CLOSED RX NOSE/JAW FRAC+WIRES  1966     C DISKECTOMY,PERCUTANEOUS LUMBAR  07/10/2007    L5-S1 with nerve root decompression     COLONOSCOPY  7/09, 9/19    Diverticulosis, Ct Colonography, due 5 yrs      "ESOPHAGOSCOPY, GASTROSCOPY, DUODENOSCOPY (EGD), COMBINED N/A 03/15/2016    chronic gastritis     ORTHOPEDIC SURGERY Right 2009    bunnionectomy     UPPER GI ENDOSCOPY  03/2016    Erythematous mucosa in the greater curvature.        Social History     Tobacco Use     Smoking status: Former Smoker     Smokeless tobacco: Never Used     Tobacco comment: quit age 33 - 1988, 1/2 PPD 8-10 yrs   Substance Use Topics     Alcohol use: Not Currently     Alcohol/week: 0.0 - 1.0 standard drinks     Comment: 1-2 per month       Family History   Problem Relation Age of Onset     Cancer Father         renal cell CA     Breast Cancer Maternal Aunt      Coronary Artery Disease Maternal Grandmother      Coronary Artery Disease Paternal Grandmother      Coronary Artery Disease Paternal Uncle        Prior to Admission medications    Medication Sig Start Date End Date Taking? Authorizing Provider   Ascorbic Acid (VITAMIN C) 100 MG CHEW Take 1 chew tab by mouth daily    Reported, Patient   BREO ELLIPTA 200-25 MCG/INH Inhaler INHALE ONE PUFF BY MOUTH ONCE DAILY 4/16/21   Rory Haque MD   CALCIUM + D 600-200 MG-UNIT OR TABS TWICE A DAY    Reported, Patient   cetirizine (ZYRTEC) 10 MG tablet  8/22/21   Reported, Patient   Cholecalciferol (VITAMIN D) 2000 UNITS tablet Take 2,000 Units by mouth daily 2/24/16   Pa Montana MD   folic acid 0.8 MG CAPS     Reported, Patient   magnesium 250 MG tablet Take 1 tablet by mouth daily. 10/4/12   Weiler, Karen, MD   omeprazole (PRILOSEC) 40 MG DR capsule Take 1 capsule (40 mg) by mouth daily 8/30/21   Pa Montana MD       Allergies   Allergen Reactions     Vioxx      stomach upset     Nickel Rash     Accompanied by itching and swelling        REVIEW OF SYSTEMS:   5 point ROS negative except as noted above in HPI, including Gen., Resp., CV, GI &  system review.    PHYSICAL EXAM:   /81   Pulse 85   Temp 96.9  F (36.1  C) (Temporal)   Ht 1.651 m (5' 5\")   Wt 54.4 kg (120 lb)   LMP " "08/08/2009   SpO2 100%   BMI 19.97 kg/m   Estimated body mass index is 19.97 kg/m  as calculated from the following:    Height as of this encounter: 1.651 m (5' 5\").    Weight as of this encounter: 54.4 kg (120 lb).   GENERAL APPEARANCE: alert, and oriented  MENTAL STATUS: alert  AIRWAY EXAM: Mallampatti Class I (visualization of the soft palate, fauces, uvula, anterior and posterior pillars)  RESP: lungs clear to auscultation - no rales, rhonchi or wheezes  CV: regular rates and rhythm  DIAGNOSTICS:    Not indicated    IMPRESSION   ASA Class 2 - Mild systemic disease    PLAN:   Plan for Gastroscopy with possible biopsy, possible dilation. We discussed the risks, benefits and alternatives and the patient wished to proceed.    The above has been forwarded to the consulting provider.      Signed Electronically by: Tomasz Andres MD  September 14, 2021          "

## 2021-09-14 NOTE — DISCHARGE INSTRUCTIONS
Celiac Disease    Celiac disease is caused by an immune-based reaction to gluten in food. Gluten is a protein found in many grains such as wheat, barley, and rye. Celiac disease affects tiny, fingerlike stalks (villi) in the small bowel (intestine). Normally, the villi make it possible for the small bowel to absorb nutrients from the food you eat. But celiac disease damages the villi. As a result, you can t absorb the nutrients you need, even if you eat plenty of food. Celiac disease is an autoimmune disease. You can manage the disease by removing gluten from your diet. This relieves your symptoms. It also reverses the damage to your small bowel. Celiac disease is sometimes called celiac sprue.   Causes of celiac disease  Celiac disease may have a genetic component. This means it can be passed down in families. If your healthcare provider thinks that you have celiac disease, he or she may advise that other members of your family be checked for it as well.   Symptoms of celiac disease   The symptoms of celiac disease can vary for each person. Some people have no symptoms at all. If symptoms do happen, they can include:     Diarrhea, constipation, or both    Light colored, foul-smelling or fatty stool    Belly pain and cramping    Belly swelling or bloating    Weight loss    Bone or joint pain    Iron deficiency    Headaches    Tiredness and loss of energy    Mood changes, irritability, and depression    Infertility    Unexplained elevated liver tests    Canker sores    Skin rash    Tooth enamel problems  Diagnosing celiac disease  Your healthcare provider will ask about your symptoms and health history. You ll also have a physical exam. Tests are then done to confirm the problem. These can include:     Blood tests. These help check for specific proteins in the blood that are present with celiac disease. They also check for anemia and help rule out other problems. The tests are done by taking a blood  sample.    Upper endoscopy with biopsy. This is done to see inside the stomach and duodenum (first part of the small bowel). For the test, an endoscope is used. This is a thin, flexible tube with a tiny camera on the end. It s inserted through the mouth and down into the stomach and duodenum. Tools are passed through the endoscope to remove tiny tissue samples (biopsy). The tissue samples are taken to a lab and looked at under a microscope. This is to check the tiny villi for damage. This test must be done while you are still eating food with gluten. This is the only way to see whether the presence of gluten is damaging the villi.    Genetic tests. These check for problems with specific genes linked to celiac disease. They are done by taking blood samples.  Treating celiac disease  To treat celiac disease, you must remove all sources of gluten from your diet. This will allow the villi to heal, so that nutrients can be absorbed normally. It s important to follow a strict, gluten-free diet daily, even if you don t have symptoms. If you don t do this, the small bowel can become permanently damaged, which can lead to serious health problems. These include bone disease, cancer of the small bowel, and various nervous system disorders. You may need to take certain vitamins if your levels are low. Your doctor may want to recheck your intestine with an upper endoscopy or repeat the celiac blood tests to make sure the tissue has healed.   Sources of gluten  Gluten is found in wheat, barley, and rye. The most common foods with gluten are those made with wheat flour. These include bread, pasta, cake, and cereal. Gluten is also often found in beer, gravies, salad dressings, and most packaged foods. It's even found in some nonfood products, such as certain medicines and cosmetics. Your healthcare provider can refer you to a dietitian to  you about what you should avoid. The resources below will also give you lists of food  and products that contain gluten.   Follow-up  You ll meet with your healthcare provider periodically to monitor your health. During these visits, routine blood tests are often done to make sure your condition is under control. Your healthcare provider can also refer you to other healthcare providers or support and advocacy groups to help you cope with your condition.   Learning more about celiac disease  The following resources can help you learn more about celiac disease and how to manage it:     Celiac Disease Foundation , www.celiac.org    National Celiac Association , www.csaceliacs.org    Gluten Intolerance Group , www.gluten.org    National Julian of Diabetes and Digestive and Kidney Diseases , www.niddk.nih.gov  Lio last reviewed this educational content on 6/1/2019 2000-2021 [employer]. All rights reserved. This information is not intended as a substitute for professional medical care. Always follow your healthcare professional's instructions.        Gastritis (Adult)    Gastritis is inflammation and irritation of the stomach lining. You can have it for a short time (acute) or it can be long lasting (chronic). Infection with bacteria called H. pylori most often causes gastritis. More than 1 out of 3 people in the [place] have these bacteria in their bodies. In many cases, H. pylori causes no problems or symptoms. But in some people, the infection irritates the stomach lining and causes gastritis. H. pylori may be diagnosed through blood, stool, or breath tests, or by a biopsy during an endoscopy. Other causes of stomach irritation include drinking alcohol, smoking or chewing tobacco, or taking pain-relieving medicines called nonsteroidal anti-inflammatory drugs (NSAIDs), such as aspirin or ibuprofen. Some illegal drugs (such as cocaine) and immune conditions can also cause gastritis.   Symptoms of gastritis can include:    Belly pain or bloating    Feeling full quickly    Loss of  appetite    Nausea or vomiting    Vomiting blood or having black stools    Feeling more tired than normal  An inflamed and irritated stomach lining is more likely to develop a sore called an ulcer. To help prevent this, gastritis should be evaluated and treated as soon as symptoms occur.   Home care  If needed, your healthcare provider may prescribe medicines. If you have H. pylori infection, treating it will likely ease your symptoms. Other changes can help reduce stomach irritation and help it heal.     Take prescription medicines as directed. If you have been prescribed medicines for H. pylori infection, take them as directed. Take all of the medicine until it's finished or until your provider tells you to stop taking it, even if you start to feel better.    Follow your healthcare provider's advice on NSAIDs. Your provider may advise you not to take NSAIDs. If you take daily aspirin for your heart or other health reasons, don't stop without talking with your provider first.    Don't drink alcohol. If you need help stopping your use of alcohol, ask your provider for treatment resources.    Stop smoking. Smoking can irritate the stomach and delay healing. As much as possible, stay away from secondhand smoke. If you smoke and have trouble stopping, ask your provider for help.  Follow-up care  Follow up with your healthcare provider, or as advised. You may need testing to check for inflammation or an ulcer.   When to get medical advice  Call your healthcare provider for any of the following:    Stomach pain that gets worse or moves to the lower right belly (appendix area)    Chest pain that suddenly appears or gets worse, or spreads to the back, neck, shoulder, or arm    Frequent vomiting (can t keep down liquids)    Blood in the stool or vomit (red or black in color)    Feeling weak or dizzy    Shortness of breath    Unexplained weight loss    Fever of 100.4 F (38 C) or higher, or as directed by your healthcare  provider    Symptoms that get worse, or new symptoms  Lio last reviewed this educational content on 2/1/2021 2000-2021 [employer]. All rights reserved. This information is not intended as a substitute for professional medical care. Always follow your healthcare professional's instructions.

## 2021-09-15 ENCOUNTER — HOSPITAL ENCOUNTER (INPATIENT)
Facility: CLINIC | Age: 66
LOS: 1 days | Discharge: HOME | DRG: 418 | End: 2021-09-18
Attending: EMERGENCY MEDICINE | Admitting: INTERNAL MEDICINE
Payer: COMMERCIAL

## 2021-09-15 ENCOUNTER — APPOINTMENT (OUTPATIENT)
Dept: CT IMAGING | Facility: CLINIC | Age: 66
DRG: 418 | End: 2021-09-15
Attending: EMERGENCY MEDICINE
Payer: COMMERCIAL

## 2021-09-15 DIAGNOSIS — R63.4 WEIGHT LOSS: ICD-10-CM

## 2021-09-15 DIAGNOSIS — R50.9 FEBRILE ILLNESS: ICD-10-CM

## 2021-09-15 DIAGNOSIS — R07.9 CHEST PAIN, UNSPECIFIED TYPE: ICD-10-CM

## 2021-09-15 DIAGNOSIS — K82.8 BILIARY DYSKINESIA: Primary | ICD-10-CM

## 2021-09-15 DIAGNOSIS — K80.20 GALLSTONES: ICD-10-CM

## 2021-09-15 DIAGNOSIS — G89.18 POSTOPERATIVE PAIN: ICD-10-CM

## 2021-09-15 DIAGNOSIS — R79.89 LFT ELEVATION: ICD-10-CM

## 2021-09-15 LAB
ANION GAP SERPL CALCULATED.3IONS-SCNC: 3 MMOL/L (ref 3–14)
ATRIAL RATE - MUSE: 64 BPM
BASOPHILS # BLD AUTO: 0 10E3/UL (ref 0–0.2)
BASOPHILS NFR BLD AUTO: 0 %
BUN SERPL-MCNC: 13 MG/DL (ref 7–30)
CALCIUM SERPL-MCNC: 7.9 MG/DL (ref 8.5–10.1)
CHLORIDE BLD-SCNC: 104 MMOL/L (ref 94–109)
CO2 SERPL-SCNC: 29 MMOL/L (ref 20–32)
CREAT SERPL-MCNC: 0.82 MG/DL (ref 0.52–1.04)
D DIMER PPP FEU-MCNC: 1.18 UG/ML FEU (ref 0–0.5)
DIASTOLIC BLOOD PRESSURE - MUSE: NORMAL MMHG
EOSINOPHIL # BLD AUTO: 0.1 10E3/UL (ref 0–0.7)
EOSINOPHIL NFR BLD AUTO: 2 %
ERYTHROCYTE [DISTWIDTH] IN BLOOD BY AUTOMATED COUNT: 12.8 % (ref 10–15)
GFR SERPL CREATININE-BSD FRML MDRD: 75 ML/MIN/1.73M2
GLUCOSE BLD-MCNC: 156 MG/DL (ref 70–99)
HCT VFR BLD AUTO: 37.3 % (ref 35–47)
HGB BLD-MCNC: 12.5 G/DL (ref 11.7–15.7)
IMM GRANULOCYTES # BLD: 0 10E3/UL
IMM GRANULOCYTES NFR BLD: 0 %
INTERPRETATION ECG - MUSE: NORMAL
LYMPHOCYTES # BLD AUTO: 0.6 10E3/UL (ref 0.8–5.3)
LYMPHOCYTES NFR BLD AUTO: 13 %
MCH RBC QN AUTO: 30.6 PG (ref 26.5–33)
MCHC RBC AUTO-ENTMCNC: 33.5 G/DL (ref 31.5–36.5)
MCV RBC AUTO: 91 FL (ref 78–100)
MONOCYTES # BLD AUTO: 0.2 10E3/UL (ref 0–1.3)
MONOCYTES NFR BLD AUTO: 5 %
NEUTROPHILS # BLD AUTO: 3.7 10E3/UL (ref 1.6–8.3)
NEUTROPHILS NFR BLD AUTO: 80 %
NRBC # BLD AUTO: 0 10E3/UL
NRBC BLD AUTO-RTO: 0 /100
P AXIS - MUSE: 35 DEGREES
PLATELET # BLD AUTO: 192 10E3/UL (ref 150–450)
POTASSIUM BLD-SCNC: 3.7 MMOL/L (ref 3.4–5.3)
PR INTERVAL - MUSE: 162 MS
QRS DURATION - MUSE: 84 MS
QT - MUSE: 410 MS
QTC - MUSE: 422 MS
R AXIS - MUSE: 42 DEGREES
RBC # BLD AUTO: 4.09 10E6/UL (ref 3.8–5.2)
SARS-COV-2 RNA RESP QL NAA+PROBE: NEGATIVE
SODIUM SERPL-SCNC: 136 MMOL/L (ref 133–144)
SYSTOLIC BLOOD PRESSURE - MUSE: NORMAL MMHG
T AXIS - MUSE: 57 DEGREES
TROPONIN I SERPL-MCNC: <0.015 UG/L (ref 0–0.04)
VENTRICULAR RATE- MUSE: 64 BPM
WBC # BLD AUTO: 4.7 10E3/UL (ref 4–11)

## 2021-09-15 PROCEDURE — 36415 COLL VENOUS BLD VENIPUNCTURE: CPT | Performed by: INTERNAL MEDICINE

## 2021-09-15 PROCEDURE — 84484 ASSAY OF TROPONIN QUANT: CPT | Performed by: EMERGENCY MEDICINE

## 2021-09-15 PROCEDURE — 250N000009 HC RX 250: Performed by: EMERGENCY MEDICINE

## 2021-09-15 PROCEDURE — 258N000003 HC RX IP 258 OP 636: Performed by: EMERGENCY MEDICINE

## 2021-09-15 PROCEDURE — 93005 ELECTROCARDIOGRAM TRACING: CPT

## 2021-09-15 PROCEDURE — 80048 BASIC METABOLIC PNL TOTAL CA: CPT | Performed by: EMERGENCY MEDICINE

## 2021-09-15 PROCEDURE — 96374 THER/PROPH/DIAG INJ IV PUSH: CPT | Mod: 59

## 2021-09-15 PROCEDURE — G0378 HOSPITAL OBSERVATION PER HR: HCPCS

## 2021-09-15 PROCEDURE — 85025 COMPLETE CBC W/AUTO DIFF WBC: CPT | Performed by: EMERGENCY MEDICINE

## 2021-09-15 PROCEDURE — 99220 PR INITIAL OBSERVATION CARE,LEVEL III: CPT | Performed by: INTERNAL MEDICINE

## 2021-09-15 PROCEDURE — 99285 EMERGENCY DEPT VISIT HI MDM: CPT | Mod: 25

## 2021-09-15 PROCEDURE — 250N000011 HC RX IP 250 OP 636: Performed by: EMERGENCY MEDICINE

## 2021-09-15 PROCEDURE — 87635 SARS-COV-2 COVID-19 AMP PRB: CPT | Performed by: EMERGENCY MEDICINE

## 2021-09-15 PROCEDURE — 250N000013 HC RX MED GY IP 250 OP 250 PS 637: Performed by: INTERNAL MEDICINE

## 2021-09-15 PROCEDURE — 96361 HYDRATE IV INFUSION ADD-ON: CPT

## 2021-09-15 PROCEDURE — C9803 HOPD COVID-19 SPEC COLLECT: HCPCS

## 2021-09-15 PROCEDURE — 36415 COLL VENOUS BLD VENIPUNCTURE: CPT | Performed by: EMERGENCY MEDICINE

## 2021-09-15 PROCEDURE — 71275 CT ANGIOGRAPHY CHEST: CPT

## 2021-09-15 PROCEDURE — 85379 FIBRIN DEGRADATION QUANT: CPT | Performed by: EMERGENCY MEDICINE

## 2021-09-15 PROCEDURE — 84484 ASSAY OF TROPONIN QUANT: CPT | Performed by: INTERNAL MEDICINE

## 2021-09-15 RX ORDER — NALOXONE HYDROCHLORIDE 0.4 MG/ML
0.4 INJECTION, SOLUTION INTRAMUSCULAR; INTRAVENOUS; SUBCUTANEOUS
Status: DISCONTINUED | OUTPATIENT
Start: 2021-09-15 | End: 2021-09-18 | Stop reason: HOSPADM

## 2021-09-15 RX ORDER — NALOXONE HYDROCHLORIDE 0.4 MG/ML
0.2 INJECTION, SOLUTION INTRAMUSCULAR; INTRAVENOUS; SUBCUTANEOUS
Status: DISCONTINUED | OUTPATIENT
Start: 2021-09-15 | End: 2021-09-18 | Stop reason: HOSPADM

## 2021-09-15 RX ORDER — NITROGLYCERIN 0.4 MG/1
0.4 TABLET SUBLINGUAL EVERY 5 MIN PRN
Status: DISCONTINUED | OUTPATIENT
Start: 2021-09-15 | End: 2021-09-18 | Stop reason: HOSPADM

## 2021-09-15 RX ORDER — IOPAMIDOL 755 MG/ML
56 INJECTION, SOLUTION INTRAVASCULAR ONCE
Status: COMPLETED | OUTPATIENT
Start: 2021-09-15 | End: 2021-09-15

## 2021-09-15 RX ORDER — FAMOTIDINE 20 MG/1
20 TABLET, FILM COATED ORAL 2 TIMES DAILY
Status: DISCONTINUED | OUTPATIENT
Start: 2021-09-15 | End: 2021-09-17

## 2021-09-15 RX ORDER — DEXTROMETHORPHAN POLISTIREX 30 MG/5ML
30 SUSPENSION ORAL
Status: DISCONTINUED | OUTPATIENT
Start: 2021-09-15 | End: 2021-09-18 | Stop reason: HOSPADM

## 2021-09-15 RX ORDER — ACETAMINOPHEN 650 MG/1
650 SUPPOSITORY RECTAL EVERY 6 HOURS PRN
Status: DISCONTINUED | OUTPATIENT
Start: 2021-09-15 | End: 2021-09-18 | Stop reason: HOSPADM

## 2021-09-15 RX ORDER — HYDROMORPHONE HYDROCHLORIDE 2 MG/1
2 TABLET ORAL EVERY 4 HOURS PRN
Status: DISCONTINUED | OUTPATIENT
Start: 2021-09-15 | End: 2021-09-18 | Stop reason: HOSPADM

## 2021-09-15 RX ORDER — ASPIRIN 81 MG/1
81 TABLET ORAL DAILY
Status: DISCONTINUED | OUTPATIENT
Start: 2021-09-16 | End: 2021-09-18 | Stop reason: HOSPADM

## 2021-09-15 RX ORDER — HYDROMORPHONE HCL IN WATER/PF 6 MG/30 ML
0.2 PATIENT CONTROLLED ANALGESIA SYRINGE INTRAVENOUS
Status: DISCONTINUED | OUTPATIENT
Start: 2021-09-15 | End: 2021-09-18 | Stop reason: HOSPADM

## 2021-09-15 RX ORDER — ACETAMINOPHEN 325 MG/1
650 TABLET ORAL EVERY 6 HOURS PRN
Status: DISCONTINUED | OUTPATIENT
Start: 2021-09-15 | End: 2021-09-18 | Stop reason: HOSPADM

## 2021-09-15 RX ORDER — KETOROLAC TROMETHAMINE 15 MG/ML
15 INJECTION, SOLUTION INTRAMUSCULAR; INTRAVENOUS ONCE
Status: COMPLETED | OUTPATIENT
Start: 2021-09-15 | End: 2021-09-15

## 2021-09-15 RX ORDER — LIDOCAINE 40 MG/G
CREAM TOPICAL
Status: DISCONTINUED | OUTPATIENT
Start: 2021-09-15 | End: 2021-09-18 | Stop reason: HOSPADM

## 2021-09-15 RX ORDER — MAGNESIUM HYDROXIDE/ALUMINUM HYDROXICE/SIMETHICONE 120; 1200; 1200 MG/30ML; MG/30ML; MG/30ML
30 SUSPENSION ORAL EVERY 4 HOURS PRN
Status: DISCONTINUED | OUTPATIENT
Start: 2021-09-15 | End: 2021-09-18 | Stop reason: HOSPADM

## 2021-09-15 RX ADMIN — SODIUM CHLORIDE 83 ML: 9 INJECTION, SOLUTION INTRAVENOUS at 11:01

## 2021-09-15 RX ADMIN — SODIUM CHLORIDE 1000 ML: 9 INJECTION, SOLUTION INTRAVENOUS at 10:49

## 2021-09-15 RX ADMIN — Medication 2 TABLET: at 17:20

## 2021-09-15 RX ADMIN — ACETAMINOPHEN 650 MG: 325 TABLET, FILM COATED ORAL at 22:08

## 2021-09-15 RX ADMIN — KETOROLAC TROMETHAMINE 15 MG: 15 INJECTION, SOLUTION INTRAMUSCULAR; INTRAVENOUS at 10:49

## 2021-09-15 RX ADMIN — IOPAMIDOL 56 ML: 755 INJECTION, SOLUTION INTRAVENOUS at 11:01

## 2021-09-15 RX ADMIN — DEXTROMETHORPHAN 30 MG: 30 SUSPENSION, EXTENDED RELEASE ORAL at 23:02

## 2021-09-15 RX ADMIN — FAMOTIDINE 20 MG: 20 TABLET ORAL at 20:22

## 2021-09-15 ASSESSMENT — ENCOUNTER SYMPTOMS
MYALGIAS: 0
NAUSEA: 0
ARTHRALGIAS: 0
COUGH: 0
VOMITING: 0
SHORTNESS OF BREATH: 0
DIARRHEA: 0
FEVER: 0
DIAPHORESIS: 1

## 2021-09-15 ASSESSMENT — ACTIVITIES OF DAILY LIVING (ADL)
WALKING_OR_CLIMBING_STAIRS_DIFFICULTY: NO
DOING_ERRANDS_INDEPENDENTLY_DIFFICULTY: NO
DIFFICULTY_COMMUNICATING: NO
DIFFICULTY_EATING/SWALLOWING: NO
PATIENT_/_FAMILY_COMMUNICATION_STYLE: SPOKEN LANGUAGE (ENGLISH OR BILINGUAL)
WEAR_GLASSES_OR_BLIND: YES
VISION_MANAGEMENT: GLASSES
DRESSING/BATHING_DIFFICULTY: NO
FALL_HISTORY_WITHIN_LAST_SIX_MONTHS: NO
CONCENTRATING,_REMEMBERING_OR_MAKING_DECISIONS_DIFFICULTY: NO
TOILETING_ISSUES: NO
HEARING_DIFFICULTY_OR_DEAF: NO

## 2021-09-15 ASSESSMENT — MIFFLIN-ST. JEOR: SCORE: 1063.88

## 2021-09-15 NOTE — PLAN OF CARE
RECEIVING UNIT ED HANDOFF REVIEW    ED Nurse Handoff Report was reviewed by: Leandro Raymond, RN on September 15, 2021 at 3:08 PM

## 2021-09-15 NOTE — PHARMACY-ADMISSION MEDICATION HISTORY
Pharmacy Medication History  Admission medication history interview status for the 9/15/2021  admission is complete. See EPIC admission navigator for prior to admission medications     Location of Interview: Patient room  Medication history sources: Patient, Surescripts and Care Everywhere    Significant changes made to the medication list:  None    In the past week, patient estimated taking medication this percent of the time: greater than 90%    Additional medication history information:   Denia did not take her supplements this morning due to not feeling well, but she had her omeprazole.  She no longer uses Breo Ellipta regularly, but has an old inhaler that she is keeping for as needed use.  We discussed that Breo works to prevent shortness of breath and cough when it is used regularly.  It is not meant for use as a rescue inhaler.    Medication reconciliation completed by provider prior to medication history? No    Time spent in this activity: 15 minutes    Prior to Admission medications    Medication Sig Last Dose Taking? Auth Provider   Ascorbic Acid 500 MG CHEW Take 500 mg by mouth daily 9/14/2021 at Unknown time Yes Unknown, Entered By History   CALCIUM + D 600-200 MG-UNIT OR TABS Take 1 tablet by mouth 2 times daily  9/14/2021 at Unknown time Yes Reported, Patient   cetirizine (ZYRTEC) 10 MG tablet Take 10 mg by mouth daily  9/14/2021 at Unknown time Yes Reported, Patient   Cholecalciferol (VITAMIN D) 2000 UNITS tablet Take 2,000 Units by mouth daily 9/14/2021 at Unknown time Yes Pa Montana MD   folic acid 0.8 MG CAPS  9/14/2021 at Unknown time Yes Reported, Patient   magnesium 250 MG tablet Take 1 tablet by mouth daily. 9/14/2021 at Unknown time Yes Weiler, Karen, MD   omeprazole (PRILOSEC) 40 MG DR capsule Take 1 capsule (40 mg) by mouth daily 9/15/2021 at am Yes Pa Montana MD   BREO ELLIPTA 200-25 MCG/INH Inhaler INHALE ONE PUFF BY MOUTH ONCE DAILY   Rory Haque MD       The information  provided in this note is only as accurate as the sources available at the time of update(s)

## 2021-09-15 NOTE — ED NOTES
[facility]  ED Nurse Handoff Report    ED Chief complaint: Chest Pain      ED Diagnosis:   Final diagnoses:   None       Code Status: Full Code    Allergies:   Allergies   Allergen Reactions     Vioxx      stomach upset     Nickel Rash     Accompanied by itching and swelling       Patient Story: CP and diaphoretic  Focused Assessment:  Otherwise healthy, Pt has been getting a cardiac work up after gyn MD heard a mummer; ECHO showed a moderate to sever mitral valve prolapse. PE Ct was negitive; pt coming in for further work up     Treatments and/or interventions provided: ASA, nitroglycerin (2 by EMS, no change) IV fluids, Toradol     Patient's response to treatments and/or interventions: well     To be done/followed up on inpatient unit:  cards follow up    Does this patient have any cognitive concerns?: alert    Activity level - Baseline/Home:  Independent  Activity Level - Current:   Independent    Patient's Preferred language: English   Needed?: No    Isolation: None  Infection: Not Applicable  Patient tested for COVID 19 prior to admission: YES  Bariatric?: No    Vital Signs:   Vitals:    09/15/21 1000 09/15/21 1030 09/15/21 1041 09/15/21 1130   BP: 120/80 114/72  104/84   Pulse: 75 67  79   Resp:  (!) 6  17   Temp:   97.7  F (36.5  C)    TempSrc:   Temporal    SpO2: 100% 100%  99%       Cardiac Rhythm:Cardiac Rhythm: Normal sinus rhythm    Was the PSS-3 completed:   Yes  What interventions are required if any?               Family Comments:   OBS brochure/video discussed/provided to patient/family: Yes              Name of person given brochure if not patient:               Relationship to patient:     For the majority of the shift this patient's behavior was Green.   Behavioral interventions performed were .    ED NURSE PHONE NUMBER: *[phone]

## 2021-09-15 NOTE — ED TRIAGE NOTES
EMS gave 324 ASA, 2 nitroglycerin which brought CP from a 10/10 down to a 4/10, 4mg of Zofran; pt has been getting cardiac workout

## 2021-09-15 NOTE — PLAN OF CARE
Pt admitted to the unit under observation for chest pain. A/ox4. VSS. Tele NSR. No chest pain at this time. LS clear. Second troponin draw was WNL, final recheck @ 2015. Cardiac diet , no caffeine. Up independently.

## 2021-09-15 NOTE — H&P
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    Chest pain.  D-dimer was mildly elevated but chest CT was negative for pulmonary embolus or other causes of chest pain.  Troponin and EKG were negative.  -Serial troponins  -Stress echo if troponins are negative  -If work-up negative this could be esophageal spasm from reflux, musculoskeletal pain or an anxiety attack.   -Add famotidine twice daily to daily Protonix and would also use as needed Tylenol  -Follow-up with primary care physician for ongoing management    Hypocalcemia.  Admission calcium level was 7.9.  She does take calcium and vitamin D supplements.  -Repeat BMP tomorrow with LFTs to check albumin level and an ionized calcium.  -If calcium continues to be low could consider further work-up with PFTs, phosphorus level, and vitamin D levels  -Increase calcium with vitamin D supplements to 2 tablets twice daily and follow-up with PCP in 4 weeks to repeat levels    GERD.  -Continue daily omeprazole and add famotidine as above    Moderate mitral regurgitation.  Seen on recent echocardiogram.  -Follow-up with cardiology as arranged    Unintentional weight loss.  Has lost 40 pounds with early satiety.  Gastric emptying study showed decreased motility but her to doctors disagree with how much this is impacting her weight loss.  EGD showed some flattening of the stomach folds but was otherwise normal.  Biopsies were taken.  Does have a history of H. pylori and developed C. difficile a few months after treatment.  -Await biopsy results and follow-up with GI    Chronic cough.  -Continue inhaler  as outpatient  -Continue nighttime Robitussin cough medication as needed nightly     Diet: Combination Diet Regular Diet Adult; No Caffeine Diet  DVT Prophylaxis: Low Risk/Ambulatory with no VTE prophylaxis indicated and Pneumatic Compression Devices  King Catheter: Not present  Central Lines: None  Code Status: Full Code    Disposition Plan   Expected discharge: 09/16/2021 recommended to prior living arrangement once cardiac work-up complete.     The patient's care was discussed with the Patient.    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 She was getting ready this morning and had onset of sharp mid sternal chest pain and became diaphoretic. She was unsure if this could have been a panic attack, however she has not been stressed lately so thinks that is unlikely. She laid in bed but pain did not subside so she called EMS. At time of EMS call pain was at a 10/10, however prior to paramedic arrival pain had reduced to a 05/10 but the pain lasted for over 30 minutes. She was given 324 mg of aspirin and 2 nitroglycerin, however she is unsure if this helped reduce her symptoms or if they just improved on their own.  Her initial pain episode has the pain in the center of her chest and she said it was sharp.  She was diaphoretic but denied any shortness of breath, nausea or vomiting.  In the  ambulance and the emergency department she had pain come back that was more on her left side of her chest radiating towards the middle of her chest. Denies any jaw pain or shoulder pain. Denies fevers, cough, diarrhea, leg pain or swelling. Denies parental cardiac history. Notes she had an echocardiogram done on August 10th due to a heart murmur found by her OB and that showed moderate mitral regurgitation so she has a follow-up with cardiology planned on October 4. She had an endoscopy done yesterday due to chronic weight loss, findings unremarkable other than some flattening of the stomach folds.  Her reflux has been well controlled and she recently decreased her omeprazole from twice daily to once daily.  This pain does not feel like her typical reflux. She does have famotidine at home and uses that intermittently when symptoms are not controlled.  Evaluation in the emergency department showed a mildly elevated D-dimer with negative chest CT that only showed some old granulomatous disease and some known gallstones and sludge in her gallbladder, negative troponin, and normal EKG. Lab work was normal except for a mildly decreased calcium of 7.9 and mild glucose elevation of 156 that was nonfasting. TSH was normal 3 months ago. Calcium was mildly decreased at 8.4 one year ago. She does take calcium + vitamin D supplements.     Echocardiogram from 08/10/2021  Interpretation Summary  Left ventricular size, global systolic function, and wall motion are normal,  estimated LVEF 60-65%.  Right ventricular global function is normal.  Moderately severe (3+) mitral regurgitation. There is borderline mitral valve  prolapse.     Upper GI Endoscopy from 09/14/2021  Impression:               Normal esophagus.   Gastric mucosal atrophy. Biopsied.   Normal examined duodenum. Biopsied.     Review of Systems    The 10 point Review of Systems is negative other than noted in the HPI or here.  Weight loss and early satiety.  Chronic  right leg numbness.    Past Medical History    I have reviewed this patient's medical history and updated it with pertinent information if needed.   Past Medical History:   Diagnosis Date     CARDIOVASCULAR SCREENING; LDL GOAL LESS THAN 130      Chronic cough      Gastroparesis 2019    mild/moderate - [facility]     GERD (gastroesophageal reflux disease)      LPRD (laryngopharyngeal reflux disease)     Dr Hernandez - cough     Nephrolithiasis 05/2021    bilateral, small     Thoracic or lumbosacral neuritis or radiculitis, unspecified 05/02/2007    Disc Herniation at L4-5, L5-S1       Past Surgical History   I have reviewed this patient's surgical history and updated it with pertinent information if needed.  Past Surgical History:   Procedure Laterality Date     C CLOSED RX NOSE/JAW FRAC+WIRES  1966     C DISKECTOMY,PERCUTANEOUS LUMBAR  07/10/2007    L5-S1 with nerve root decompression     COLONOSCOPY  7/09, 9/19    Diverticulosis, Ct Colonography, due 5 yrs     ESOPHAGOSCOPY, GASTROSCOPY, DUODENOSCOPY (EGD), COMBINED N/A 03/15/2016    chronic gastritis     ESOPHAGOSCOPY, GASTROSCOPY, DUODENOSCOPY (EGD), COMBINED N/A 9/14/2021    Procedure: ESOPHAGOGASTRODUODENOSCOPY, WITH BIOPSies using biopsy forceps;  Surgeon: Tomasz Andres MD;  Location:  GI     ORTHOPEDIC SURGERY Right 2009    bunnionectomy     UPPER GI ENDOSCOPY  03/2016    Erythematous mucosa in the greater curvature.        Social History   I have reviewed this patient's social history and updated it with pertinent information if needed.  Social History     Tobacco Use     Smoking status: Former Smoker     Smokeless tobacco: Never Used     Tobacco comment: quit age 33 - 1988, 1/2 PPD 8-10 yrs   Substance Use Topics     Alcohol use: Not Currently     Alcohol/week: 0.0 - 1.0 standard drinks     Comment: 1-2 per month     Drug use: No       Family History   I have reviewed this patient's family history and updated it with pertinent information if needed.  Family  History   Problem Relation Age of Onset     Cancer Father         renal cell CA     Breast Cancer Maternal Aunt      Coronary Artery Disease Maternal Grandmother      Coronary Artery Disease Paternal Grandmother      Coronary Artery Disease Paternal Uncle        Prior to Admission Medications   Prior to Admission Medications   Prescriptions Last Dose Informant Patient Reported? Taking?   Ascorbic Acid 500 MG CHEW 9/14/2021 at Unknown time Self Yes Yes   Sig: Take 500 mg by mouth daily   BREO ELLIPTA 200-25 MCG/INH Inhaler  Self No No   Sig: INHALE ONE PUFF BY MOUTH ONCE DAILY   CALCIUM + D 600-200 MG-UNIT OR TABS 9/14/2021 at Unknown time Self Yes Yes   Sig: Take 1 tablet by mouth 2 times daily    Cholecalciferol (VITAMIN D) 2000 UNITS tablet 9/14/2021 at Unknown time Self Yes Yes   Sig: Take 2,000 Units by mouth daily   Doxylamine-DM 12.5-30 MG/10ML LIQD  at Unknown  Yes Yes   Sig: Take 10 mLs by mouth nightly as needed   cetirizine (ZYRTEC) 10 MG tablet 9/14/2021 at Unknown time Self Yes Yes   Sig: Take 10 mg by mouth daily    folic acid 0.8 MG CAPS 9/14/2021 at Unknown time Self Yes Yes   magnesium 250 MG tablet 9/14/2021 at Unknown time Self Yes Yes   Sig: Take 1 tablet by mouth daily.   omeprazole (PRILOSEC) 40 MG DR capsule 9/15/2021 at am Self No Yes   Sig: Take 1 capsule (40 mg) by mouth daily      Facility-Administered Medications: None     Allergies   Allergies   Allergen Reactions     Vioxx      stomach upset     Nickel Rash     Accompanied by itching and swelling       Physical Exam   Vital Signs: Temp: 98.7  F (37.1  C) Temp src: Oral BP: (!) 140/83 Pulse: 104   Resp: 16 SpO2: 97 % O2 Device: None (Room air)    Weight: 114 lbs 3.17 oz  Constitutional: Awake, alert, cooperative, no apparent distress.  Eyes: Conjunctiva and pupils examined and normal.  HEENT: Moist mucous membranes, normal dentition.  Respiratory: Clear to auscultation bilaterally, no crackles or wheezing.  Cardiovascular: Regular rate  and rhythm, normal S1 and S2, and 3/6 systolic murmur at apex noted.  GI: Soft, non-distended, non-tender, normal bowel sounds.  Lymph/Hematologic: No anterior cervical or supraclavicular adenopathy.  Skin: No rashes, no cyanosis, no edema.  Musculoskeletal: No joint swelling, erythema or tenderness.  Neurologic: Cranial nerves 2-12 intact, normal strength and sensation except for some mild numbness in the right leg that she says is chronic.  Psychiatric: Alert, oriented to person, place and time, no obvious anxiety or depression.      Data   Data reviewed today: I reviewed all medications, new labs and imaging results over the last 24 hours. I personally reviewed the EKG tracing showing sinus rhythm with no signs of ischemic changes.    Recent Labs   Lab 09/15/21  1017   WBC 4.7   HGB 12.5   MCV 91         POTASSIUM 3.7   CHLORIDE 104   CO2 29   BUN 13   CR 0.82   ANIONGAP 3   KEYA 7.9*   *   TROPONIN <0.015     Recent Results (from the past 24 hour(s))   CT Chest Pulmonary Embolism w Contrast    Narrative    CT CHEST PULMONARY EMBOLISM WITH CONTRAST  9/15/2021 11:13 AM    CLINICAL HISTORY: Dyspnea. Positive D-dimer.    TECHNIQUE: CT angiogram chest during arterial phase injection IV  contrast. 2D and 3D MIP reconstructions were performed by the CT  technologist. Dose reduction techniques were used.     CONTRAST: 56 mL Isovue-370    COMPARISON: None.    FINDINGS:  ANGIOGRAM CHEST: Pulmonary arteries are normal caliber and negative  for pulmonary emboli. Thoracic aorta is negative for dissection. No CT  evidence of right heart strain.    LUNGS AND PLEURA: Calcified granulomas noted in the lower lobes and  right frontal lobe. Mild dependent atelectasis at the lung bases. No  pleural fluid.    MEDIASTINUM/AXILLAE: Heart is normal in size. No pericardial fluid.  Calcified mediastinal and left hilar lymph nodes are present  consistent with old granulomatous disease. No enlarged lymph nodes  are  appreciated. Thyroid gland appears normal in size. Esophagus is  unremarkable.    UPPER ABDOMEN: Gallstones or gallbladder sludge noted. No gallbladder  inflammation is appreciated.    MUSCULOSKELETAL: Degenerative spine changes. No destructive bone  lesions.      Impression    IMPRESSION:  1.  No pulmonary embolism. Thoracic aorta is unremarkable.    2.  Evidence of old granulomatous disease.    3.  Gallbladder stones or sludge. Follow-up gallbladder ultrasound if  clinically warranted for further assessment.    MARCIAL JOSEPH MD         SYSTEM ID:  [id number]

## 2021-09-15 NOTE — ED PROVIDER NOTES
History   Chief Complaint:  Chest Pain    The history is provided by the patient.      Denia Dailey is a 66 year old female with history of GERD who presents with chest pain. Two hours prior to arrival, she was getting ready for the day when she had sudden onset of non-radiating, 10/10, sharp central chest pain with diaphoresis. She was not feeling anxious or stressed. She rested, but when the pain did not subside she called 911. By the time police arrived, her pain was about 5/10 and was nearly resolved but the time paramedics arrived such that she is unable to state if the nitroglycerin they administered was helpful or not. She did get aspirin en route. Most recently, she has had return of pain which is more dull and left sided. She had no nausea, vomiting, or shortness of breath. She has had no recent fever, cough, or diarrhea. She denies leg pain or swelling, recent travel, or recent surgeries.    Of note, she had an echocardiogram done on August 10th due to an incidental heart murmur found by her gynecologist. It showed mitral regurgitation, as below. She has a follow up appointment with cardiology for that on October 4th. Further, she also had an endoscopy done yesterday due to chronic unintentional weight loss, findings below.      Echocardiogram from 08/10/2021  Interpretation Summary  Left ventricular size, global systolic function, and wall motion are normal,  estimated LVEF 60-65%.  Right ventricular global function is normal.  Moderately severe (3+) mitral regurgitation. There is borderline mitral valve  prolapse.    Upper GI Endoscopy from 09/14/2021  Impression:               Normal esophagus.   Gastric mucosal atrophy. Biopsied.   Normal examined duodenum. Biopsied.     Review of Systems   Constitutional: Positive for diaphoresis and unexpected weight change (chronic). Negative for activity change and fever.   Respiratory: Negative for cough and shortness of breath.    Cardiovascular: Positive  "for chest pain. Negative for leg swelling.   Gastrointestinal: Negative for diarrhea, nausea and vomiting.   Musculoskeletal: Negative for arthralgias and myalgias.   Psychiatric/Behavioral: The patient is not nervous/anxious.    All other systems reviewed and are negative.    Allergies:  Vioxx  Nickel    Medications:  Zyrtec  Prilosec    Past Medical History:    Gastroparesis  GERD  Laryngopharyngeal reflux disease  Nephrolithiasis  Thoracic of lumbosacral neuritis or radiculitis   Mitral valve regurgitation  Colitis  Lumbago    Past Surgical History:    Diskectomy, L5-S1 with nerve root decompression  Colonoscopy  Esophagogastroduodenoscopy (x2)  Bunionectomy     Family History:    Father - Renal Cell Cancer  Denies first degree relatives with cardiac disease.    Social History:  The patient arrived to the emergency department via EMS.  She is a nurse here at [employer].  Does not smoke.    Physical Exam     Patient Vitals for the past 24 hrs:   BP Temp Temp src Pulse Resp SpO2 Height Weight   09/15/21 1555 (!) 140/83 98.7  F (37.1  C) Oral 104 16 97 % 1.651 m (5' 5\") 51.8 kg (114 lb 3.2 oz)   09/15/21 1430 -- -- -- 84 15 -- -- --   09/15/21 1400 -- -- -- 90 16 -- -- --   09/15/21 1330 -- -- -- 96 14 -- -- --   09/15/21 1300 -- -- -- 91 8 -- -- --   09/15/21 1230 -- -- -- 89 9 -- -- --   09/15/21 1200 -- -- -- 86 9 -- -- --   09/15/21 1130 104/84 -- -- 79 17 99 % -- --   09/15/21 1041 -- 97.7  F (36.5  C) Temporal -- -- -- -- --   09/15/21 1030 114/72 -- -- 67 (!) 6 100 % -- --   09/15/21 1000 120/80 -- -- 75 -- 100 % -- --   09/15/21 0953 -- -- -- -- -- 100 % -- --   09/15/21 0952 107/69 -- -- 75 11 -- -- --       Physical Exam  General: Well-developed and well-nourished. Well appearing middle aged  woman. Cooperative.  Head:  Atraumatic.  Eyes:  Conjunctivae, lids, and sclerae are normal.  Neck:  Supple. Normal range of motion.  CV:  Regular rate and rhythm. Normal heart sounds with no murmurs, rubs, or " gallops detected.  Resp:  No respiratory distress. Clear to auscultation bilaterally without decreased breath sounds, wheezing, rales, or rhonchi.  GI:  Soft. Non-distended. Non-tender.    MS:  Normal ROM. No bilateral lower extremity edema.  Skin:  Warm. Non-diaphoretic. No pallor.  Neuro:  Awake. A&Ox3. Normal strength.  Psych: Normal mood and affect. Normal speech.  Vitals reviewed.    Emergency Department Course   EKG  Indication: Chest pain   Time: 0957  Rate 64 bpm. TX interval 162. QRS duration 84. QT/QTc 410/422.   Normal sinus rhythm. Normal ECG.   No acute ST changes.  No significant change as compared to prior, dated 09/19/18.     Imaging:  CT Chest Pulmonary Embolism with Contrast  1.  No pulmonary embolism. Thoracic aorta is unremarkable.  2.  Evidence of old granulomatous disease.  3.  Gallbladder stones or sludge. Follow-up gallbladder ultrasound if  clinically warranted for further assessment.  Reading per radiology.    Laboratory:   CBC: WBC 4.7, HGB 12.5,    BMP: Calcium 7.9 (L), Glucose 156 (H), o/w WNL (Creatinine 0.82)   Troponin (Collected 1017): <0.015  Ddimer: 1.18 (H)    Asymptomatic Influenza A/B & SARS-CoV2 (COVID-19) Virus PCR Multiplex: negative    Emergency Department Course:    Reviewed:  I reviewed vitals and past medical history.    Assessments:  1005 I obtained history and examined the patient as noted above.   1300 I rechecked the patient and explained findings. The patient feels comfortable with admission.     Consults:   4880 I spoke to Dr. Issa of the hospitalist service who accepts the patient for admission.     Interventions:  1049 0.9% NaCl bolus 1000 mL IV  1049 Toradol 15 mg IV    Disposition:  The patient was admitted to the hospital under the care of Dr. Issa.     Impression & Plan   Medical Decision Making:  Denia is a 66-year-old woman who is currently undergoing an outpatient work-up for weight loss including endoscopy yesterday which appeared reassuring.   She is also awaiting cardiology appointment for newly diagnosed mitral regurgitation.  She had a sudden onset of central sharp chest pain that she describes as quite severe and associated with diaphoresis about an hour and 20 minutes prior to arrival.  She felt that this pain did improve spontaneously so she is uncertain if the nitroglyecerin administered per paramedics was helpful or not.  She has had some recurrence of left-sided chest pain by the time of interview.  Her exam is unremarkable.  Her EKG is reassuring without acute ST changes or arrhythmias.  Initial troponin is undetectable.  However, this is obtained just under 2 hours after onset of pain.  Her D-dimer was elevated so she was sent for CT angiogram of the chest.  Fortunately, there is no pulmonary embolism or other acute intrathoracic pathology noted.  There is incidental gallstones but the patient has absolutely no right upper quadrant pain or tenderness.  Basic laboratory studies are otherwise noncontributory.  She was given IV fluids and Toradol and on repeat evaluation feels some mild residual left-sided pain.  Although her story is atypical and her work-up here is reassuring, the severity of her pain associated with diaphoresis is quite concerning to me.  I have recommended an observation admission for further cardiac work-up and stress testing.  Denia is comfortable with this and I answered all her questions.  I discussed the patient's case with Dr. Issa, hospitalist, who accepts admission and has no further orders.    Diagnosis:    ICD-10-CM    1. Chest pain, unspecified type  R07.9    2. Gallstones  K80.20      Scribe Disclosure:  I, Maritza Chan, am serving as a scribe at 9:57 AM on 9/15/2021 to document services personally performed by Yani Saini MD based on my observations and the provider's statements to me.     Yani Saini MD  09/16/21 2575

## 2021-09-16 ENCOUNTER — APPOINTMENT (OUTPATIENT)
Dept: NUCLEAR MEDICINE | Facility: CLINIC | Age: 66
DRG: 418 | End: 2021-09-16
Attending: PHYSICIAN ASSISTANT
Payer: COMMERCIAL

## 2021-09-16 ENCOUNTER — APPOINTMENT (OUTPATIENT)
Dept: CARDIOLOGY | Facility: CLINIC | Age: 66
DRG: 418 | End: 2021-09-16
Attending: INTERNAL MEDICINE
Payer: COMMERCIAL

## 2021-09-16 LAB
ALBUMIN SERPL-MCNC: 3 G/DL (ref 3.4–5)
ALBUMIN SERPL-MCNC: 3 G/DL (ref 3.4–5)
ALP SERPL-CCNC: 141 U/L (ref 40–150)
ALP SERPL-CCNC: 146 U/L (ref 40–150)
ALT SERPL W P-5'-P-CCNC: 203 U/L (ref 0–50)
ALT SERPL W P-5'-P-CCNC: 260 U/L (ref 0–50)
ANION GAP SERPL CALCULATED.3IONS-SCNC: 3 MMOL/L (ref 3–14)
ANION GAP SERPL CALCULATED.3IONS-SCNC: 5 MMOL/L (ref 3–14)
AST SERPL W P-5'-P-CCNC: 109 U/L (ref 0–45)
AST SERPL W P-5'-P-CCNC: 200 U/L (ref 0–45)
BILIRUB DIRECT SERPL-MCNC: 0.4 MG/DL (ref 0–0.2)
BILIRUB SERPL-MCNC: 0.9 MG/DL (ref 0.2–1.3)
BILIRUB SERPL-MCNC: 1.2 MG/DL (ref 0.2–1.3)
BUN SERPL-MCNC: 14 MG/DL (ref 7–30)
BUN SERPL-MCNC: 16 MG/DL (ref 7–30)
CA-I BLD-MCNC: 4.5 MG/DL (ref 4.4–5.2)
CALCIUM SERPL-MCNC: 8.6 MG/DL (ref 8.5–10.1)
CALCIUM SERPL-MCNC: 8.7 MG/DL (ref 8.5–10.1)
CHLORIDE BLD-SCNC: 109 MMOL/L (ref 94–109)
CHLORIDE BLD-SCNC: 110 MMOL/L (ref 94–109)
CO2 SERPL-SCNC: 26 MMOL/L (ref 20–32)
CO2 SERPL-SCNC: 27 MMOL/L (ref 20–32)
CREAT SERPL-MCNC: 0.73 MG/DL (ref 0.52–1.04)
CREAT SERPL-MCNC: 0.85 MG/DL (ref 0.52–1.04)
ERYTHROCYTE [DISTWIDTH] IN BLOOD BY AUTOMATED COUNT: 12.8 % (ref 10–15)
GFR SERPL CREATININE-BSD FRML MDRD: 72 ML/MIN/1.73M2
GFR SERPL CREATININE-BSD FRML MDRD: 87 ML/MIN/1.73M2
GLUCOSE BLD-MCNC: 102 MG/DL (ref 70–99)
GLUCOSE BLD-MCNC: 110 MG/DL (ref 70–99)
HCT VFR BLD AUTO: 36.5 % (ref 35–47)
HGB BLD-MCNC: 12.3 G/DL (ref 11.7–15.7)
LACTATE SERPL-SCNC: 0.8 MMOL/L (ref 0.7–2)
MCH RBC QN AUTO: 30.5 PG (ref 26.5–33)
MCHC RBC AUTO-ENTMCNC: 33.7 G/DL (ref 31.5–36.5)
MCV RBC AUTO: 91 FL (ref 78–100)
PATH REPORT.COMMENTS IMP SPEC: NORMAL
PATH REPORT.FINAL DX SPEC: NORMAL
PATH REPORT.GROSS SPEC: NORMAL
PATH REPORT.MICROSCOPIC SPEC OTHER STN: NORMAL
PATH REPORT.RELEVANT HX SPEC: NORMAL
PHOTO IMAGE: NORMAL
PLATELET # BLD AUTO: 198 10E3/UL (ref 150–450)
POTASSIUM BLD-SCNC: 3.8 MMOL/L (ref 3.4–5.3)
POTASSIUM BLD-SCNC: 4.4 MMOL/L (ref 3.4–5.3)
PROT SERPL-MCNC: 6.3 G/DL (ref 6.8–8.8)
PROT SERPL-MCNC: 6.3 G/DL (ref 6.8–8.8)
RBC # BLD AUTO: 4.03 10E6/UL (ref 3.8–5.2)
SODIUM SERPL-SCNC: 139 MMOL/L (ref 133–144)
SODIUM SERPL-SCNC: 141 MMOL/L (ref 133–144)
WBC # BLD AUTO: 2.2 10E3/UL (ref 4–11)

## 2021-09-16 PROCEDURE — 93325 DOPPLER ECHO COLOR FLOW MAPG: CPT | Mod: 26 | Performed by: INTERNAL MEDICINE

## 2021-09-16 PROCEDURE — 82248 BILIRUBIN DIRECT: CPT | Performed by: INTERNAL MEDICINE

## 2021-09-16 PROCEDURE — 84450 TRANSFERASE (AST) (SGOT): CPT | Performed by: HOSPITALIST

## 2021-09-16 PROCEDURE — 82040 ASSAY OF SERUM ALBUMIN: CPT | Performed by: INTERNAL MEDICINE

## 2021-09-16 PROCEDURE — 343N000001 HC RX 343: Performed by: INTERNAL MEDICINE

## 2021-09-16 PROCEDURE — 93350 STRESS TTE ONLY: CPT | Mod: 26 | Performed by: INTERNAL MEDICINE

## 2021-09-16 PROCEDURE — 36415 COLL VENOUS BLD VENIPUNCTURE: CPT | Performed by: HOSPITALIST

## 2021-09-16 PROCEDURE — 93350 STRESS TTE ONLY: CPT | Mod: TC

## 2021-09-16 PROCEDURE — G0378 HOSPITAL OBSERVATION PER HR: HCPCS

## 2021-09-16 PROCEDURE — 93321 DOPPLER ECHO F-UP/LMTD STD: CPT | Mod: TC

## 2021-09-16 PROCEDURE — 82330 ASSAY OF CALCIUM: CPT | Performed by: INTERNAL MEDICINE

## 2021-09-16 PROCEDURE — 258N000003 HC RX IP 258 OP 636: Performed by: PHYSICIAN ASSISTANT

## 2021-09-16 PROCEDURE — 250N000013 HC RX MED GY IP 250 OP 250 PS 637: Performed by: HOSPITALIST

## 2021-09-16 PROCEDURE — 83605 ASSAY OF LACTIC ACID: CPT | Performed by: HOSPITALIST

## 2021-09-16 PROCEDURE — 85027 COMPLETE CBC AUTOMATED: CPT | Performed by: HOSPITALIST

## 2021-09-16 PROCEDURE — 78227 HEPATOBIL SYST IMAGE W/DRUG: CPT

## 2021-09-16 PROCEDURE — 36415 COLL VENOUS BLD VENIPUNCTURE: CPT | Performed by: INTERNAL MEDICINE

## 2021-09-16 PROCEDURE — A9537 TC99M MEBROFENIN: HCPCS | Performed by: INTERNAL MEDICINE

## 2021-09-16 PROCEDURE — 99207 PR NO CHARGE LOS: CPT | Performed by: PHYSICIAN ASSISTANT

## 2021-09-16 PROCEDURE — 99226 PR SUBSEQUENT OBSERVATION CARE,LEVEL III: CPT | Performed by: INTERNAL MEDICINE

## 2021-09-16 PROCEDURE — 80048 BASIC METABOLIC PNL TOTAL CA: CPT | Performed by: INTERNAL MEDICINE

## 2021-09-16 PROCEDURE — 93321 DOPPLER ECHO F-UP/LMTD STD: CPT | Mod: 26 | Performed by: INTERNAL MEDICINE

## 2021-09-16 PROCEDURE — 93018 CV STRESS TEST I&R ONLY: CPT | Performed by: INTERNAL MEDICINE

## 2021-09-16 PROCEDURE — 250N000011 HC RX IP 250 OP 636: Performed by: INTERNAL MEDICINE

## 2021-09-16 PROCEDURE — 84155 ASSAY OF PROTEIN SERUM: CPT | Performed by: HOSPITALIST

## 2021-09-16 PROCEDURE — 87040 BLOOD CULTURE FOR BACTERIA: CPT | Performed by: HOSPITALIST

## 2021-09-16 PROCEDURE — 250N000013 HC RX MED GY IP 250 OP 250 PS 637: Performed by: INTERNAL MEDICINE

## 2021-09-16 PROCEDURE — 93016 CV STRESS TEST SUPVJ ONLY: CPT | Performed by: INTERNAL MEDICINE

## 2021-09-16 RX ORDER — POLYETHYLENE GLYCOL 3350 17 G/17G
17 POWDER, FOR SOLUTION ORAL 2 TIMES DAILY
Status: DISCONTINUED | OUTPATIENT
Start: 2021-09-16 | End: 2021-09-18 | Stop reason: HOSPADM

## 2021-09-16 RX ORDER — KIT FOR THE PREPARATION OF TECHNETIUM TC 99M MEBROFENIN 45 MG/10ML
6 INJECTION, POWDER, LYOPHILIZED, FOR SOLUTION INTRAVENOUS ONCE
Status: COMPLETED | OUTPATIENT
Start: 2021-09-16 | End: 2021-09-16

## 2021-09-16 RX ORDER — SINCALIDE 5 UG/5ML
0.02 INJECTION, POWDER, LYOPHILIZED, FOR SOLUTION INTRAVENOUS ONCE
Status: DISCONTINUED | OUTPATIENT
Start: 2021-09-16 | End: 2021-09-16

## 2021-09-16 RX ADMIN — FAMOTIDINE 20 MG: 20 TABLET ORAL at 08:00

## 2021-09-16 RX ADMIN — ASPIRIN 81 MG: 81 TABLET, COATED ORAL at 08:02

## 2021-09-16 RX ADMIN — Medication 2 TABLET: at 17:49

## 2021-09-16 RX ADMIN — Medication 2 TABLET: at 10:12

## 2021-09-16 RX ADMIN — FAMOTIDINE 20 MG: 20 TABLET ORAL at 20:08

## 2021-09-16 RX ADMIN — SINCALIDE 1 MCG: 5 INJECTION, POWDER, LYOPHILIZED, FOR SOLUTION INTRAVENOUS at 14:50

## 2021-09-16 RX ADMIN — ACETAMINOPHEN 650 MG: 325 TABLET, FILM COATED ORAL at 20:08

## 2021-09-16 RX ADMIN — OMEPRAZOLE 40 MG: 20 CAPSULE, DELAYED RELEASE ORAL at 08:00

## 2021-09-16 RX ADMIN — SODIUM CHLORIDE 500 ML: 9 INJECTION, SOLUTION INTRAVENOUS at 15:47

## 2021-09-16 RX ADMIN — POLYETHYLENE GLYCOL 3350 17 G: 17 POWDER, FOR SOLUTION ORAL at 22:35

## 2021-09-16 RX ADMIN — MEBROFENIN 6.2 MILLICURIE: 45 INJECTION, POWDER, LYOPHILIZED, FOR SOLUTION INTRAVENOUS at 14:00

## 2021-09-16 ASSESSMENT — ENCOUNTER SYMPTOMS
UNEXPECTED WEIGHT CHANGE: 1
ACTIVITY CHANGE: 0
NERVOUS/ANXIOUS: 0

## 2021-09-16 NOTE — PLAN OF CARE
[id number]:  Pt is a/ox4. VSS, ccasionally tachy. Tele SR/ST. No chest pain at this time. Stress echo and HIDA scan done today. Plan to keep pt NPO at midnight for General Surgery consult. Independent.

## 2021-09-16 NOTE — PLAN OF CARE
Observation goals PRIOR TO DISCHARGE       Comments: List all goals to be met before discharge home:     - Serial troponins and stress test complete-Not met     - Seen and cleared by consultant if applicable -N/A    - Adequate pain control on oral analgesia -Met    - Vital signs normal or at patient baseline -Not met,still tachy    - Safe disposition plan has been identified -Met    - Nurse to notify provider when observation goals have been met and patient is ready for discharge.

## 2021-09-16 NOTE — PLAN OF CARE
"RN:  Patient A/O x4.  Heart rate monitored on telemetry--ST.  Other VSS.  Tylenol given x3 for complaints of leg pain and \"tenseness\".  Bilateral LE skin temperature from the ankle to her toes runs cool to cold.  No discoloration to the skin or edema noted.  Tolerating cardiac diet orders.  IV saline locked.  X3 negative troponins.  Echo stress test ordered for tomorrow.  No outstanding consult orders at this time.  Patient up IND in the room and hallways.  Discharge pending progress.   "

## 2021-09-16 NOTE — PLAN OF CARE
Observation goals PRIOR TO DISCHARGE       Comments: List all goals to be met before discharge home:     - Serial troponins and stress test complete-Not met     - Seen and cleared by consultant if applicable -N/A    - Adequate pain control on oral analgesia -Met    - Vital signs normal or at patient baseline -Not met,still tachy    - Safe disposition plan has been identified -Met    - Nurse to notify provider when observation goals have been met and patient is ready for discharge.        Summary:     Pt is A&OX4,VSS on RA, denies any pain nor SOB, up ind, amb to bathroom and voiding o.k,tele-NSR, luis reg diet, no caffeine, PIV is s/l, plan is exercise stress Echo today and discharge once cardiac work up is complete.

## 2021-09-16 NOTE — PROGRESS NOTES
Observation goals PRIOR TO DISCHARGE     Comments: List all goals to be met before discharge home:  - Serial troponins and stress test complete: Not met.  Trending troponins.  Echo stress test ordered for tomorrow.     - Seen and cleared by consultant if applicable:  N/A.    - Adequate pain control on oral analgesia: Met at this time.   - Vital signs normal or at patient baseline:  Partially met.  Heart rate tachy at times.  Other VSS.   - Safe disposition plan has been identified: Met.  Patient plans to discharge to home once medically cleared.

## 2021-09-16 NOTE — PROGRESS NOTES
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     Chest pain  Elevated LFTs  Patient presents with sudden onset chest pain described a ache with associated diaphoresis.  D-dimer was mildly elevated but chest CT was negative for pulmonary embolus or other causes of chest pain.  Troponin x3 and EKG negative.  LFT on 9/16 show elevated transaminases in 200s (recently normal 5/2021). Bili 1.2.  CT also noted gallbladder stones vs sludge. Denies significant alcohol or tylenol use. Abdominal exam is benign.   She has not had recurrence of her pain since admission.   -Stress echo 9/16 with negative EKG portion, unable to fully assess for presence/absence of WMA  -abdominal US, HIDA scan today. If concern for gallbladder disease will consult surgery   -Follow-up with primary care physician for repeat LFT  -- overall low risk for cardiac etiology but CT coronary could be considered as outpatient if recurrence of symptoms.      GERD.  -Continue daily omeprazole, reports current symptoms not consistent with GERD sxs      Moderate mitral regurgitation.  Seen on recent echocardiogram.  -Follow-up with cardiology as arranged     Unintentional weight loss.  Has lost 40 pounds with early satiety.  Gastric emptying study showed decreased motility but her to doctors disagree with how much this is impacting her weight loss.  EGD showed some flattening of the stomach folds but was otherwise normal.  Biopsies were taken.  Does have a history of H. pylori and developed C. difficile a few months after treatment.  -Await biopsy results and follow-up with GI     Chronic cough.  -Continue inhaler as outpatient  -Continue  nighttime Robitussin cough medication as needed nightly    Hypocalcemia.  Admission calcium level was 7.9.  She does take calcium and vitamin D supplements. Improved on recheck with normal ionized calcium. Continue supplements at discharge.     DVT Prophylaxis: Pneumatic Compression Devices  Code Status: Full Code    Disposition: Expected discharge later tonight vs tomorrow pending results of US and HIDA scan     Pt discussed with Dr. De Guzman who agrees with the above plan     Sharron Jorgensen PA-C    Interval History   No recurrence of chest pain since admission. Denies abdominal pain, nausea. continues to have early satiety as she has for some time. No fevers, chills today. Reports episode yesterday much different than baseline GERD.     -Data reviewed today: I reviewed all new labs and imaging results over the last 24 hours. I personally reviewed no images or EKG's today.    Physical Exam   Temp: 98.5  F (36.9  C) Temp src: Oral BP: 108/68 Pulse: 104   Resp: 18 SpO2: 99 % O2 Device: None (Room air)    Vitals:    09/15/21 1555   Weight: 51.8 kg (114 lb 3.2 oz)     Vital Signs with Ranges  Temp:  [97.7  F (36.5  C)-99.7  F (37.6  C)] 98.5  F (36.9  C)  Pulse:  [] 104  Resp:  [6-18] 18  BP: (100-140)/(56-84) 108/68  SpO2:  [95 %-100 %] 99 %  I/O last 3 completed shifts:  In: 1000 [IV Piggyback:1000]  Out: -     Constitutional: Alert and oriented, sitting up in bed. Appears comfortable and is appropriately conversant   ENT:  moist mucous membranes  Eyes:  Sclera anicteric, EOMI  Respiratory: Lungs clear to auscultation bilaterally, no increased work of breathing  Cardiovascular: Regular rate and rhythm, no LE edeme  GI: active bowel sounds, abdomen soft, non-distended. Negative schrader sign. No rebound or guarding.   MSK:  Moves all four extremities. Normal tone. Chest wall non tender to palpation  Neuro:  Speech is clear. Face symmetric. Follows commands.     Medications       aspirin  81 mg Oral Daily      calcium carbonate 600 mg-vitamin D 400 units  2 tablet Oral BID w/meals     famotidine  20 mg Oral BID     omeprazole  40 mg Oral Daily     sodium chloride (PF)  3 mL Intracatheter Q8H       Data   Recent Labs   Lab 09/16/21  0648 09/15/21  2024 09/15/21  1656 09/15/21  1017   WBC  --   --   --  4.7   HGB  --   --   --  12.5   MCV  --   --   --  91   PLT  --   --   --  192     --   --  136   POTASSIUM 4.4  --   --  3.7   CHLORIDE 109  --   --  104   CO2 27  --   --  29   BUN 16  --   --  13   CR 0.73  --   --  0.82   ANIONGAP 3  --   --  3   KEYA 8.7  --   --  7.9*   *  --   --  156*   ALBUMIN 3.0*  --   --   --    PROTTOTAL 6.3*  --   --   --    BILITOTAL 1.2  --   --   --    ALKPHOS 146  --   --   --    *  --   --   --    *  --   --   --    TROPONIN  --  <0.015 <0.015 <0.015       Recent Results (from the past 24 hour(s))   CT Chest Pulmonary Embolism w Contrast    Narrative    CT CHEST PULMONARY EMBOLISM WITH CONTRAST  9/15/2021 11:13 AM    CLINICAL HISTORY: Dyspnea. Positive D-dimer.    TECHNIQUE: CT angiogram chest during arterial phase injection IV  contrast. 2D and 3D MIP reconstructions were performed by the CT  technologist. Dose reduction techniques were used.     CONTRAST: 56 mL Isovue-370    COMPARISON: None.    FINDINGS:  ANGIOGRAM CHEST: Pulmonary arteries are normal caliber and negative  for pulmonary emboli. Thoracic aorta is negative for dissection. No CT  evidence of right heart strain.    LUNGS AND PLEURA: Calcified granulomas noted in the lower lobes and  right frontal lobe. Mild dependent atelectasis at the lung bases. No  pleural fluid.    MEDIASTINUM/AXILLAE: Heart is normal in size. No pericardial fluid.  Calcified mediastinal and left hilar lymph nodes are present  consistent with old granulomatous disease. No enlarged lymph nodes are  appreciated. Thyroid gland appears normal in size. Esophagus is  unremarkable.    UPPER ABDOMEN: Gallstones or gallbladder sludge  noted. No gallbladder  inflammation is appreciated.    MUSCULOSKELETAL: Degenerative spine changes. No destructive bone  lesions.      Impression    IMPRESSION:  1.  No pulmonary embolism. Thoracic aorta is unremarkable.    2.  Evidence of old granulomatous disease.    3.  Gallbladder stones or sludge. Follow-up gallbladder ultrasound if  clinically warranted for further assessment.    MARCIAL JOSEPH MD         SYSTEM ID:  [id number]

## 2021-09-17 ENCOUNTER — ANESTHESIA (OUTPATIENT)
Dept: SURGERY | Facility: CLINIC | Age: 66
DRG: 418 | End: 2021-09-17
Payer: COMMERCIAL

## 2021-09-17 ENCOUNTER — APPOINTMENT (OUTPATIENT)
Dept: GENERAL RADIOLOGY | Facility: CLINIC | Age: 66
DRG: 418 | End: 2021-09-17
Attending: INTERNAL MEDICINE
Payer: COMMERCIAL

## 2021-09-17 ENCOUNTER — ANESTHESIA EVENT (OUTPATIENT)
Dept: SURGERY | Facility: CLINIC | Age: 66
DRG: 418 | End: 2021-09-17
Payer: COMMERCIAL

## 2021-09-17 PROBLEM — K80.50 BILIARY COLIC: Status: ACTIVE | Noted: 2021-09-17

## 2021-09-17 LAB
ALBUMIN SERPL-MCNC: 2.9 G/DL (ref 3.4–5)
ALBUMIN UR-MCNC: 10 MG/DL
ALP SERPL-CCNC: 133 U/L (ref 40–150)
ALT SERPL W P-5'-P-CCNC: 170 U/L (ref 0–50)
ANION GAP SERPL CALCULATED.3IONS-SCNC: 4 MMOL/L (ref 3–14)
APPEARANCE UR: CLEAR
AST SERPL W P-5'-P-CCNC: 76 U/L (ref 0–45)
BILIRUB SERPL-MCNC: 0.7 MG/DL (ref 0.2–1.3)
BILIRUB UR QL STRIP: NEGATIVE
BUN SERPL-MCNC: 13 MG/DL (ref 7–30)
CALCIUM SERPL-MCNC: 8.5 MG/DL (ref 8.5–10.1)
CHLORIDE BLD-SCNC: 111 MMOL/L (ref 94–109)
CO2 SERPL-SCNC: 26 MMOL/L (ref 20–32)
COLOR UR AUTO: YELLOW
CREAT SERPL-MCNC: 0.66 MG/DL (ref 0.52–1.04)
GFR SERPL CREATININE-BSD FRML MDRD: >90 ML/MIN/1.73M2
GLUCOSE BLD-MCNC: 92 MG/DL (ref 70–99)
GLUCOSE UR STRIP-MCNC: NEGATIVE MG/DL
HGB UR QL STRIP: ABNORMAL
KETONES UR STRIP-MCNC: 10 MG/DL
LEUKOCYTE ESTERASE UR QL STRIP: NEGATIVE
MUCOUS THREADS #/AREA URNS LPF: PRESENT /LPF
NITRATE UR QL: NEGATIVE
PH UR STRIP: 6 [PH] (ref 5–7)
POTASSIUM BLD-SCNC: 4.2 MMOL/L (ref 3.4–5.3)
PROT SERPL-MCNC: 6.1 G/DL (ref 6.8–8.8)
RBC URINE: 19 /HPF
SODIUM SERPL-SCNC: 141 MMOL/L (ref 133–144)
SP GR UR STRIP: 1.02 (ref 1–1.03)
SQUAMOUS EPITHELIAL: <1 /HPF
UROBILINOGEN UR STRIP-MCNC: NORMAL MG/DL
WBC URINE: 1 /HPF

## 2021-09-17 PROCEDURE — 250N000013 HC RX MED GY IP 250 OP 250 PS 637: Performed by: PHYSICIAN ASSISTANT

## 2021-09-17 PROCEDURE — 250N000025 HC SEVOFLURANE, PER MIN: Performed by: SURGERY

## 2021-09-17 PROCEDURE — 250N000011 HC RX IP 250 OP 636: Performed by: NURSE ANESTHETIST, CERTIFIED REGISTERED

## 2021-09-17 PROCEDURE — 710N000009 HC RECOVERY PHASE 1, LEVEL 1, PER MIN: Performed by: SURGERY

## 2021-09-17 PROCEDURE — 272N000001 HC OR GENERAL SUPPLY STERILE: Performed by: SURGERY

## 2021-09-17 PROCEDURE — 88304 TISSUE EXAM BY PATHOLOGIST: CPT | Mod: TC | Performed by: SURGERY

## 2021-09-17 PROCEDURE — 99222 1ST HOSP IP/OBS MODERATE 55: CPT | Mod: 57 | Performed by: SURGERY

## 2021-09-17 PROCEDURE — 36415 COLL VENOUS BLD VENIPUNCTURE: CPT | Performed by: PHYSICIAN ASSISTANT

## 2021-09-17 PROCEDURE — 47563 LAPARO CHOLECYSTECTOMY/GRAPH: CPT | Performed by: SURGERY

## 2021-09-17 PROCEDURE — 258N000003 HC RX IP 258 OP 636: Performed by: NURSE ANESTHETIST, CERTIFIED REGISTERED

## 2021-09-17 PROCEDURE — 999N000141 HC STATISTIC PRE-PROCEDURE NURSING ASSESSMENT: Performed by: SURGERY

## 2021-09-17 PROCEDURE — 258N000001 HC RX 258: Performed by: SURGERY

## 2021-09-17 PROCEDURE — 47563 LAPARO CHOLECYSTECTOMY/GRAPH: CPT | Mod: AS | Performed by: PHYSICIAN ASSISTANT

## 2021-09-17 PROCEDURE — G0378 HOSPITAL OBSERVATION PER HR: HCPCS

## 2021-09-17 PROCEDURE — 0FT44ZZ RESECTION OF GALLBLADDER, PERCUTANEOUS ENDOSCOPIC APPROACH: ICD-10-PCS | Performed by: SURGERY

## 2021-09-17 PROCEDURE — 250N000009 HC RX 250: Performed by: NURSE ANESTHETIST, CERTIFIED REGISTERED

## 2021-09-17 PROCEDURE — 370N000017 HC ANESTHESIA TECHNICAL FEE, PER MIN: Performed by: SURGERY

## 2021-09-17 PROCEDURE — 250N000013 HC RX MED GY IP 250 OP 250 PS 637: Performed by: INTERNAL MEDICINE

## 2021-09-17 PROCEDURE — 120N000004 HC R&B MS OVERFLOW

## 2021-09-17 PROCEDURE — 360N000080 HC SURGERY LEVEL 7, PER MIN: Performed by: SURGERY

## 2021-09-17 PROCEDURE — 8E0W4CZ ROBOTIC ASSISTED PROCEDURE OF TRUNK REGION, PERCUTANEOUS ENDOSCOPIC APPROACH: ICD-10-PCS | Performed by: SURGERY

## 2021-09-17 PROCEDURE — 81001 URINALYSIS AUTO W/SCOPE: CPT | Performed by: INTERNAL MEDICINE

## 2021-09-17 PROCEDURE — 82040 ASSAY OF SERUM ALBUMIN: CPT | Performed by: PHYSICIAN ASSISTANT

## 2021-09-17 PROCEDURE — 999N000179 XR SURGERY CARM FLUORO LESS THAN 5 MIN W STILLS

## 2021-09-17 PROCEDURE — 250N000011 HC RX IP 250 OP 636: Performed by: PHYSICIAN ASSISTANT

## 2021-09-17 PROCEDURE — 250N000009 HC RX 250: Performed by: SURGERY

## 2021-09-17 PROCEDURE — 99233 SBSQ HOSP IP/OBS HIGH 50: CPT | Performed by: INTERNAL MEDICINE

## 2021-09-17 RX ORDER — DEXAMETHASONE SODIUM PHOSPHATE 4 MG/ML
INJECTION, SOLUTION INTRA-ARTICULAR; INTRALESIONAL; INTRAMUSCULAR; INTRAVENOUS; SOFT TISSUE PRN
Status: DISCONTINUED | OUTPATIENT
Start: 2021-09-17 | End: 2021-09-17

## 2021-09-17 RX ORDER — PROPOFOL 10 MG/ML
INJECTION, EMULSION INTRAVENOUS PRN
Status: DISCONTINUED | OUTPATIENT
Start: 2021-09-17 | End: 2021-09-17

## 2021-09-17 RX ORDER — ONDANSETRON 2 MG/ML
INJECTION INTRAMUSCULAR; INTRAVENOUS PRN
Status: DISCONTINUED | OUTPATIENT
Start: 2021-09-17 | End: 2021-09-17

## 2021-09-17 RX ORDER — HYDROMORPHONE HCL IN WATER/PF 6 MG/30 ML
0.2 PATIENT CONTROLLED ANALGESIA SYRINGE INTRAVENOUS EVERY 5 MIN PRN
Status: DISCONTINUED | OUTPATIENT
Start: 2021-09-17 | End: 2021-09-17 | Stop reason: HOSPADM

## 2021-09-17 RX ORDER — SODIUM CHLORIDE, SODIUM LACTATE, POTASSIUM CHLORIDE, CALCIUM CHLORIDE 600; 310; 30; 20 MG/100ML; MG/100ML; MG/100ML; MG/100ML
INJECTION, SOLUTION INTRAVENOUS CONTINUOUS PRN
Status: DISCONTINUED | OUTPATIENT
Start: 2021-09-17 | End: 2021-09-17

## 2021-09-17 RX ORDER — FENTANYL CITRATE 50 UG/ML
INJECTION, SOLUTION INTRAMUSCULAR; INTRAVENOUS PRN
Status: DISCONTINUED | OUTPATIENT
Start: 2021-09-17 | End: 2021-09-17

## 2021-09-17 RX ORDER — CEFAZOLIN SODIUM 2 G/100ML
2 INJECTION, SOLUTION INTRAVENOUS
Status: DISCONTINUED | OUTPATIENT
Start: 2021-09-17 | End: 2021-09-17

## 2021-09-17 RX ORDER — LABETALOL HYDROCHLORIDE 5 MG/ML
10 INJECTION, SOLUTION INTRAVENOUS
Status: DISCONTINUED | OUTPATIENT
Start: 2021-09-17 | End: 2021-09-17 | Stop reason: HOSPADM

## 2021-09-17 RX ORDER — FENTANYL CITRATE 0.05 MG/ML
25 INJECTION, SOLUTION INTRAMUSCULAR; INTRAVENOUS EVERY 5 MIN PRN
Status: DISCONTINUED | OUTPATIENT
Start: 2021-09-17 | End: 2021-09-17 | Stop reason: HOSPADM

## 2021-09-17 RX ORDER — ALBUTEROL SULFATE 0.83 MG/ML
2.5 SOLUTION RESPIRATORY (INHALATION) EVERY 4 HOURS PRN
Status: DISCONTINUED | OUTPATIENT
Start: 2021-09-17 | End: 2021-09-17 | Stop reason: HOSPADM

## 2021-09-17 RX ORDER — MEPERIDINE HYDROCHLORIDE 25 MG/ML
12.5 INJECTION INTRAMUSCULAR; INTRAVENOUS; SUBCUTANEOUS
Status: DISCONTINUED | OUTPATIENT
Start: 2021-09-17 | End: 2021-09-17 | Stop reason: HOSPADM

## 2021-09-17 RX ORDER — ONDANSETRON 4 MG/1
4 TABLET, ORALLY DISINTEGRATING ORAL EVERY 30 MIN PRN
Status: DISCONTINUED | OUTPATIENT
Start: 2021-09-17 | End: 2021-09-17 | Stop reason: HOSPADM

## 2021-09-17 RX ORDER — KETOROLAC TROMETHAMINE 30 MG/ML
INJECTION, SOLUTION INTRAMUSCULAR; INTRAVENOUS PRN
Status: DISCONTINUED | OUTPATIENT
Start: 2021-09-17 | End: 2021-09-17

## 2021-09-17 RX ORDER — DEXAMETHASONE SODIUM PHOSPHATE 4 MG/ML
4 INJECTION, SOLUTION INTRA-ARTICULAR; INTRALESIONAL; INTRAMUSCULAR; INTRAVENOUS; SOFT TISSUE EVERY 10 MIN PRN
Status: DISCONTINUED | OUTPATIENT
Start: 2021-09-17 | End: 2021-09-17 | Stop reason: HOSPADM

## 2021-09-17 RX ORDER — EPHEDRINE SULFATE 50 MG/ML
INJECTION, SOLUTION INTRAMUSCULAR; INTRAVENOUS; SUBCUTANEOUS PRN
Status: DISCONTINUED | OUTPATIENT
Start: 2021-09-17 | End: 2021-09-17

## 2021-09-17 RX ORDER — SODIUM CHLORIDE, SODIUM LACTATE, POTASSIUM CHLORIDE, CALCIUM CHLORIDE 600; 310; 30; 20 MG/100ML; MG/100ML; MG/100ML; MG/100ML
INJECTION, SOLUTION INTRAVENOUS CONTINUOUS
Status: DISCONTINUED | OUTPATIENT
Start: 2021-09-17 | End: 2021-09-17 | Stop reason: HOSPADM

## 2021-09-17 RX ORDER — SODIUM CHLORIDE, SODIUM LACTATE, POTASSIUM CHLORIDE, CALCIUM CHLORIDE 600; 310; 30; 20 MG/100ML; MG/100ML; MG/100ML; MG/100ML
INJECTION, SOLUTION INTRAVENOUS CONTINUOUS
Status: CANCELLED | OUTPATIENT
Start: 2021-09-17

## 2021-09-17 RX ORDER — BUPIVACAINE HYDROCHLORIDE 5 MG/ML
INJECTION, SOLUTION PERINEURAL PRN
Status: DISCONTINUED | OUTPATIENT
Start: 2021-09-17 | End: 2021-09-17 | Stop reason: HOSPADM

## 2021-09-17 RX ORDER — LIDOCAINE HYDROCHLORIDE 20 MG/ML
INJECTION, SOLUTION INFILTRATION; PERINEURAL PRN
Status: DISCONTINUED | OUTPATIENT
Start: 2021-09-17 | End: 2021-09-17

## 2021-09-17 RX ORDER — CEFAZOLIN SODIUM 2 G/100ML
2 INJECTION, SOLUTION INTRAVENOUS SEE ADMIN INSTRUCTIONS
Status: DISCONTINUED | OUTPATIENT
Start: 2021-09-17 | End: 2021-09-17

## 2021-09-17 RX ORDER — DIMENHYDRINATE 50 MG/ML
25 INJECTION, SOLUTION INTRAMUSCULAR; INTRAVENOUS
Status: DISCONTINUED | OUTPATIENT
Start: 2021-09-17 | End: 2021-09-17 | Stop reason: HOSPADM

## 2021-09-17 RX ORDER — INDOCYANINE GREEN AND WATER 25 MG
5 KIT INJECTION
Status: COMPLETED | OUTPATIENT
Start: 2021-09-17 | End: 2021-09-17

## 2021-09-17 RX ORDER — MAGNESIUM HYDROXIDE 1200 MG/15ML
LIQUID ORAL PRN
Status: DISCONTINUED | OUTPATIENT
Start: 2021-09-17 | End: 2021-09-17 | Stop reason: HOSPADM

## 2021-09-17 RX ORDER — PROPOFOL 10 MG/ML
INJECTION, EMULSION INTRAVENOUS CONTINUOUS PRN
Status: DISCONTINUED | OUTPATIENT
Start: 2021-09-17 | End: 2021-09-17

## 2021-09-17 RX ORDER — PIPERACILLIN SODIUM, TAZOBACTAM SODIUM 3; .375 G/15ML; G/15ML
3.38 INJECTION, POWDER, LYOPHILIZED, FOR SOLUTION INTRAVENOUS EVERY 6 HOURS
Status: DISCONTINUED | OUTPATIENT
Start: 2021-09-17 | End: 2021-09-17

## 2021-09-17 RX ORDER — FENTANYL CITRATE 50 UG/ML
25 INJECTION, SOLUTION INTRAMUSCULAR; INTRAVENOUS EVERY 5 MIN PRN
Status: CANCELLED | OUTPATIENT
Start: 2021-09-17 | End: 2021-09-17

## 2021-09-17 RX ORDER — BISACODYL 10 MG
10 SUPPOSITORY, RECTAL RECTAL ONCE
Status: COMPLETED | OUTPATIENT
Start: 2021-09-17 | End: 2021-09-17

## 2021-09-17 RX ORDER — HYDRALAZINE HYDROCHLORIDE 20 MG/ML
2.5-5 INJECTION INTRAMUSCULAR; INTRAVENOUS EVERY 10 MIN PRN
Status: DISCONTINUED | OUTPATIENT
Start: 2021-09-17 | End: 2021-09-17 | Stop reason: HOSPADM

## 2021-09-17 RX ORDER — ONDANSETRON 2 MG/ML
4 INJECTION INTRAMUSCULAR; INTRAVENOUS EVERY 30 MIN PRN
Status: DISCONTINUED | OUTPATIENT
Start: 2021-09-17 | End: 2021-09-17 | Stop reason: HOSPADM

## 2021-09-17 RX ORDER — AMOXICILLIN 250 MG
1 CAPSULE ORAL 2 TIMES DAILY
Status: DISCONTINUED | OUTPATIENT
Start: 2021-09-17 | End: 2021-09-18 | Stop reason: HOSPADM

## 2021-09-17 RX ADMIN — ACETAMINOPHEN 650 MG: 325 TABLET, FILM COATED ORAL at 22:34

## 2021-09-17 RX ADMIN — Medication 2 TABLET: at 19:40

## 2021-09-17 RX ADMIN — PROPOFOL 25 MCG/KG/MIN: 10 INJECTION, EMULSION INTRAVENOUS at 11:52

## 2021-09-17 RX ADMIN — DEXTROMETHORPHAN 30 MG: 30 SUSPENSION, EXTENDED RELEASE ORAL at 22:34

## 2021-09-17 RX ADMIN — ROCURONIUM BROMIDE 50 MG: 10 INJECTION INTRAVENOUS at 11:52

## 2021-09-17 RX ADMIN — BISACODYL 10 MG: 10 SUPPOSITORY RECTAL at 09:52

## 2021-09-17 RX ADMIN — PHENYLEPHRINE HYDROCHLORIDE 100 MCG: 10 INJECTION INTRAVENOUS at 12:20

## 2021-09-17 RX ADMIN — Medication 10 MG: at 12:01

## 2021-09-17 RX ADMIN — ONDANSETRON 4 MG: 2 INJECTION INTRAMUSCULAR; INTRAVENOUS at 13:54

## 2021-09-17 RX ADMIN — PIPERACILLIN SODIUM AND TAZOBACTAM SODIUM 3.38 G: 3; .375 INJECTION, POWDER, LYOPHILIZED, FOR SOLUTION INTRAVENOUS at 09:52

## 2021-09-17 RX ADMIN — POLYETHYLENE GLYCOL 3350 17 G: 17 POWDER, FOR SOLUTION ORAL at 19:41

## 2021-09-17 RX ADMIN — FENTANYL CITRATE 50 MCG: 50 INJECTION, SOLUTION INTRAMUSCULAR; INTRAVENOUS at 11:46

## 2021-09-17 RX ADMIN — MIDAZOLAM 2 MG: 1 INJECTION INTRAMUSCULAR; INTRAVENOUS at 11:46

## 2021-09-17 RX ADMIN — SENNOSIDES AND DOCUSATE SODIUM 1 TABLET: 8.6; 5 TABLET ORAL at 19:41

## 2021-09-17 RX ADMIN — SODIUM CHLORIDE, POTASSIUM CHLORIDE, SODIUM LACTATE AND CALCIUM CHLORIDE: 600; 310; 30; 20 INJECTION, SOLUTION INTRAVENOUS at 11:40

## 2021-09-17 RX ADMIN — INDOCYANINE GREEN AND WATER 5 MG: KIT at 11:40

## 2021-09-17 RX ADMIN — PROPOFOL 150 MG: 10 INJECTION, EMULSION INTRAVENOUS at 11:52

## 2021-09-17 RX ADMIN — LIDOCAINE HYDROCHLORIDE 100 MG: 20 INJECTION, SOLUTION INFILTRATION; PERINEURAL at 11:52

## 2021-09-17 RX ADMIN — Medication 5 MG: at 12:20

## 2021-09-17 RX ADMIN — ROCURONIUM BROMIDE 10 MG: 10 INJECTION INTRAVENOUS at 13:28

## 2021-09-17 RX ADMIN — PHENYLEPHRINE HYDROCHLORIDE 100 MCG: 10 INJECTION INTRAVENOUS at 12:04

## 2021-09-17 RX ADMIN — DEXAMETHASONE SODIUM PHOSPHATE 4 MG: 4 INJECTION, SOLUTION INTRA-ARTICULAR; INTRALESIONAL; INTRAMUSCULAR; INTRAVENOUS; SOFT TISSUE at 12:16

## 2021-09-17 RX ADMIN — FENTANYL CITRATE 50 MCG: 50 INJECTION, SOLUTION INTRAMUSCULAR; INTRAVENOUS at 13:16

## 2021-09-17 RX ADMIN — HYDROMORPHONE HYDROCHLORIDE 0.2 MG: 0.2 INJECTION, SOLUTION INTRAMUSCULAR; INTRAVENOUS; SUBCUTANEOUS at 16:29

## 2021-09-17 RX ADMIN — DEXTROMETHORPHAN 30 MG: 30 SUSPENSION, EXTENDED RELEASE ORAL at 00:03

## 2021-09-17 ASSESSMENT — ACTIVITIES OF DAILY LIVING (ADL)
ADLS_ACUITY_SCORE: 4
ADLS_ACUITY_SCORE: 6

## 2021-09-17 ASSESSMENT — LIFESTYLE VARIABLES: TOBACCO_USE: 1

## 2021-09-17 NOTE — PLAN OF CARE
Aox4. VSS on RA. Denies pain. Denies CP/SOB/dizziness. Co intermittent nausea. NPO ex meds. Moves ind. Voiding in BR. Gen surg to consult. Discharge pending improvement and plan. Continue to monitor.     Observation goals PRIOR TO DISCHARGE     Comments: List all goals to be met before discharge home:   - Serial troponins and stress test complete: met   - Seen and cleared by consultant if applicable: not met   - Adequate pain control on oral analgesia: met   - Vital signs normal or at patient baseline: met   - Safe disposition plan has been identified: not met   - Nurse to notify provider when observation goals have been met and patient is ready for discharge.

## 2021-09-17 NOTE — UTILIZATION REVIEW
Admission Status; Secondary Review Determination    Under the authority of the Utilization Management Committee, the utilization review process indicated a secondary review on the above patient. The review outcome is based on review of the medical records, discussions with staff, and applying clinical experience noted on the date of the review.    (x) Inpatient Status Appropriate - This patient's medical care is consistent with medical management for inpatient care and reasonable inpatient medical practice.    RATIONALE FOR DETERMINATION: 66-year-old female who has developed unintentional weight loss due to early satiety during recent years and underwent an EGD with biopsy a day prior to admission.  Now presented to the hospital with acute onset of 10 out of 10 sharp central chest pain with diaphoresis.  Patient had recent diagnosis of moderately severe mitral regurgitation, gastric mucosal atrophy, CT imaging revealed no pulmonary embolism but gallbladder stones or sludge was present.  Patient underwent serial troponin levels and stress echocardiogram that was negative for stress-induced ischemia.  Patient thus underwent nuclear hepatobiliary scan with findings of biliary dyskinesia.  Due to patient's significant pain on presentation, patient will further undergo laparoscopic cholecystectomy.  Complexity of patient's needed evaluation and testing as well as the planned surgical intervention is appropriate for inpatient management.    At the time of admission with the information available to the attending physician more than 2 nights Hospital complex care was anticipated, based on patient risk of adverse outcome if treated as outpatient and complex care required. Inpatient admission is appropriate based on the Medicare guidelines.    This document was produced using voice recognition software    The information on this document is developed by the utilization review team in order for the business office to  ensure compliance. This only denotes the appropriateness of proper admission status and does not reflect the quality of care rendered.    The definitions of Inpatient Status and Observation Status used in making the determination above are those provided in the CMS Coverage Manual, Chapter 1 and Chapter 6, section 70.4.    Sincerely,    Yung Palacios MD  Utilization Review  Physician Advisor  [facility].

## 2021-09-17 NOTE — ANESTHESIA CARE TRANSFER NOTE
Patient: Denia Dailey    Procedure(s):  CHOLECYSTECTOMY, ROBOT-ASSISTED, LAPAROSCOPIC, WITH CHOLANGIOGRAM    Diagnosis: Biliary dyskinesia [K82.8]  Diagnosis Additional Information: No value filed.    Anesthesia Type:   General     Note:    Oropharynx: oropharynx clear of all foreign objects  Level of Consciousness: drowsy  Oxygen Supplementation: face mask  Level of Supplemental Oxygen (L/min / FiO2): 6  Independent Airway: airway patency satisfactory and stable  Dentition: dentition unchanged  Vital Signs Stable: post-procedure vital signs reviewed and stable  Report to RN Given: handoff report given  Patient transferred to: PACU    Handoff Report: Identifed the Patient, Identified the Reponsible Provider, Reviewed the pertinent medical history, Discussed the surgical course, Reviewed Intra-OP anesthesia mangement and issues during anesthesia, Set expectations for post-procedure period and Allowed opportunity for questions and acknowledgement of understanding      Vitals:  Vitals Value Taken Time   /79    Temp     Pulse 98    Resp 12    SpO2 100        Electronically Signed By: STEPHANIE Block CRNA  September 17, 2021  2:13 PM

## 2021-09-17 NOTE — PROGRESS NOTES
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      Chest pain most likely from biliary dyskinesia with symptomatic cholelithiasis and biliary colic contributing  Elevated LFTs  Febrile illness  Patient presents with sudden onset chest pain described a ache with associated diaphoresis.  D-dimer was mildly elevated but chest CT was negative for pulmonary embolus or other causes of chest pain.  Troponin x3 and EKG negative.  LFT on 9/16 show elevated transaminases in 200s (recently normal 5/2021). Bili 1.2.  CT also noted gallbladder stones vs sludge. Denies significant alcohol or tylenol use. Abdominal exam is benign.   She has not had recurrence of her pain since admission.   -Stress echo 9/16 with negative EKG portion, unable to fully assess for presence/absence of WMA  -HIDA scan was done on 9/16/2021 due to elevated LFTs and her symptoms of chest pain and diaphoresis and ongoing nausea and inability to eat.   HIDA scan returned the abnormal with suggesting biliary dyskinesia  General surgery consulted and planning on laparoscopic cholecystectomy today  She had a fever of 101  F overnight blood cultures has been sent.  Chest CT was negative for pneumonia on admission.  We will send a UA today  Blood cultures have been obtained overnight are pending  Start Zosyn empirically today.  We will plan on 7-day course of antibiotics on discharge     GERD.  -Continue daily omeprazole, reports current symptoms not consistent with GERD sxs      Moderate mitral regurgitation.  Seen on recent echocardiogram.  -Follow-up with cardiology as arranged     Unintentional weight loss.  Has lost 40 pounds with early  satiety.  Gastric emptying study showed decreased motility but her to doctors disagree with how much this is impacting her weight loss.  EGD showed some flattening of the stomach folds but was otherwise normal.  Biopsies were taken.  Does have a history of H. pylori and developed C. difficile a few months after treatment.  -Await biopsy results and follow-up with GI     Chronic cough.  -Continue inhaler as outpatient  -Continue nighttime Robitussin cough medication as needed nightly    Hypocalcemia.  Admission calcium level was 7.9.  She does take calcium and vitamin D supplements. Improved on recheck with normal ionized calcium. Continue supplements at discharge.     DVT Prophylaxis: Pneumatic Compression Devices  Code Status: Full Code     With a fever of 101 last night and her undergoing laparoscopic cholecystectomy will need to be monitored at least for another 24 hours in the hospital so we will advance her to inpatient status today    Disposition: In the next 24 to 48 hours if she remains afebrile and recovery from lap fátima  Discussed with bedside RN patient and general surgery team today    Sadia De Guzman MD   Page [phone](7AM-6PM)          Interval History   No recurrence of chest pain since admission. Denies abdominal pain, nausea. continues to have early satiety as she has for some time.  Febrile up to 101 last night.  Blood cultures have been sent.  HIDA scan was abnormal yesterday.  General surgery consulted planning on lap fátima today.  Started her on Zosyn today empirically with a fever last night    -Data reviewed today: I reviewed all new labs and imaging results over the last 24 hours. I personally reviewed HIDA scan results which showed possible biliary dyskinesia    Physical Exam   Temp: 97.7  F (36.5  C) Temp src: Oral BP: 109/72 Pulse: 84   Resp: 17 SpO2: 97 % O2 Device: None (Room air)    Vitals:    09/15/21 1555   Weight: 51.8 kg (114 lb 3.2 oz)     Vital Signs with Ranges  Temp:  [97.7  F  (36.5  C)-101  F (38.3  C)] 97.7  F (36.5  C)  Pulse:  [] 84  Resp:  [16-18] 17  BP: ()/(54-75) 109/72  SpO2:  [96 %-100 %] 97 %  I/O last 3 completed shifts:  In: 240 [P.O.:240]  Out: -     Constitutional: Alert and oriented, NAD .  Pleasant frail and thin appearing female lying comfortably in bed  ENT:  moist mucous membranes  Eyes:  Sclera anicteric, EOMI  Respiratory: Lungs clear to auscultation bilaterally, no increased work of breathing  Cardiovascular: Regular rate and rhythm, no LE edeme  GI: active bowel sounds, abdomen soft, non-distended. Negative schrader sign. No rebound or guarding.   Neuro:  Speech is clear. Face symmetric. Follows commands.     Medications       aspirin  81 mg Oral Daily     calcium carbonate 600 mg-vitamin D 400 units  2 tablet Oral BID w/meals     omeprazole  40 mg Oral Daily     piperacillin-tazobactam  3.375 g Intravenous Q6H     polyethylene glycol  17 g Oral BID     sodium chloride (PF)  3 mL Intracatheter Q8H       Data   Recent Labs   Lab 09/17/21  0626 09/16/21 2027 09/16/21  0648 09/16/21  0648 09/15/21  2024 09/15/21  1656 09/15/21  1017 09/15/21  1017   WBC  --  2.2*  --   --   --   --   --  4.7   HGB  --  12.3  --   --   --   --   --  12.5   MCV  --  91  --   --   --   --   --  91   PLT  --  198  --   --   --   --   --  192    141  --  139  --   --    < > 136   POTASSIUM 4.2 3.8  --  4.4  --   --    < > 3.7   CHLORIDE 111* 110*  --  109  --   --    < > 104   CO2 26 26  --  27  --   --    < > 29   BUN 13 14  --  16  --   --    < > 13   CR 0.66 0.85  --  0.73  --   --    < > 0.82   ANIONGAP 4 5  --  3  --   --    < > 3   KEYA 8.5 8.6  --  8.7  --   --    < > 7.9*   GLC 92 110*  --  102*  --   --    < > 156*   ALBUMIN 2.9* 3.0*   < > 3.0*  --   --   --   --    PROTTOTAL 6.1* 6.3*   < > 6.3*  --   --   --   --    BILITOTAL 0.7 0.9   < > 1.2  --   --   --   --    ALKPHOS 133 141   < > 146  --   --   --   --    * 203*   < > 260*  --   --   --   --    AST  76* 109*   < > 200*  --   --   --   --    TROPONIN  --   --   --   --  <0.015 <0.015  --  <0.015    < > = values in this interval not displayed.       Recent Results (from the past 24 hour(s))   NM Hepatobiliary Scan w GB EF    Narrative    EXAM: NM HEPATOBILIARY SCAN WITH GB EF  LOCATION: [facility]  DATE/TIME: 9/16/2021 1:57 PM    INDICATION: Right upper quadrant abdominal pain  COMPARISON: CT the chest and pelvis dated 05/20/2021  TECHNIQUE: 6.2 mCi of technetium-99m mebrofenin, IV. Anterior planar imaging of the abdomen. 1.0 mcg of cholecystokinin analog, IV. Gallbladder imaging for 30-60 minutes.    FINDINGS: Normal radionuclide activity in liver, gallbladder, bile ducts and small bowel. No evidence of cystic or common duct obstruction or intrinsic liver disease. Gallbladder ejection fraction measures 13%, which is below the normal range of 35% or   greater.      Impression    IMPRESSION:     Findings suspicious for biliary dyskinesia.

## 2021-09-17 NOTE — PROVIDER NOTIFICATION
MD Notification    Notified Person: MD    Notified Person Name: Dr. Collier    Notification Date/Time: 9/16/21 @ 1923    Notification Interaction: [employer]    Purpose of Notification: Pt now has a temp of 101 (new), occasionally tachy.     Orders Received:     Comments:

## 2021-09-17 NOTE — PROGRESS NOTES
Observation goals PRIOR TO DISCHARGE     Comments: List all goals to be met before discharge home:   - Serial troponins and stress test complete: met   - Seen and cleared by consultant if applicable: not met   - Adequate pain control on oral analgesia: met   - Vital signs normal or at patient baseline: met   - Safe disposition plan has been identified: not met   - Nurse to notify provider when observation goals have been met and patient is ready for discharge.

## 2021-09-17 NOTE — ANESTHESIA PROCEDURE NOTES
Airway       Patient location during procedure: OR       Procedure Start/Stop Times: 9/17/2021 11:54 AM  Staff -        CRNA: Alex Novoa APRN CRNA       Performed By: CRNA  Consent for Airway        Urgency: elective  Indications and Patient Condition       Indications for airway management: kash-procedural         Mask difficulty assessment: 1 - vent by mask    Final Airway Details       Final airway type: endotracheal airway       Successful airway: ETT - single  Endotracheal Airway Details        ETT size (mm): 7.0       Cuffed: yes       Successful intubation technique: direct laryngoscopy       Grade View of Cords: 1       Adjucts: stylet       Position: Right       Bite block used: None    Post intubation assessment        Placement verified by: capnometry, equal breath sounds and chest rise        Number of attempts at approach: 1       Secured with: pink tape       Ease of procedure: easy       Dentition: Intact and Unchanged

## 2021-09-17 NOTE — PLAN OF CARE
RN:  Patient A/O x4.  VSS on RA.  NPO for lap fátima surgery.  Up IND prior to surgery.  IV patent with IV zosyn infusing prior to transfer to pre-op.  Discharge pending surgical progress.

## 2021-09-17 NOTE — CONSULTS
General Surgery Consultation    Denia Dailey MRN#: [id number]   Age: 66 year old YOB: 1955     The surgical service was consulted by HELDER Mcdermott to evaluate and/or treat this patient for abnormal HIDA.    Date of Admission:          9/15/2021       Chief Complaint:     Chief Complaint   Patient presents with     Chest Pain          History of Present Illness:   This patient is a 66 year old female who presented to the [facility] ER with sudden onset of chest pain with associated diaphoresis and nausea on 9/15/21.  She reports unintentional weight loss due to early satiety x 4 years.  She states she notes a spasm in the epigastric region last few years which resolve with rest.  She denies fever, chills, vomiting, change in BM or urination.  She denies having any previous episodes or abdominal surgeries.  The patient's co-morbidities include h/o nephrolithiasis, reflux, gastroparesis chronic cough, chronic back pain, and hx of c diff and H pylori.  She has a family history of gallbladder disease.  Since admission, a HIDA scan was ordered due to findings of gallstones on CT which showed biliary dyskinesia.  Of note, she is on ASA and is neutropenic.  The history is obtained from the patient and chart. The patient is NPO.    COVID result: negative 9/15         Past Medical History:     Past Medical History:   Diagnosis Date     C. difficile colitis      CARDIOVASCULAR SCREENING; LDL GOAL LESS THAN 130      Chronic cough      Gastroparesis 2019    mild/moderate - [facility]     GERD (gastroesophageal reflux disease)      Helicobacter pylori (H. pylori) infection      LPRD (laryngopharyngeal reflux disease)     Dr Hernandez - cough     Nephrolithiasis 05/2021    bilateral, small     Right leg numbness     Residual from lumbosacral neuritis     Thoracic or lumbosacral neuritis or radiculitis, unspecified 05/02/2007    Disc Herniation at L4-5, L5-S1     Unintentional weight loss 2021   Mitral  regurgitation         Past Surgical History:     Past Surgical History:   Procedure Laterality Date     BUNIONECTOMY  2009     C CLOSED RX NOSE/JAW FRAC+WIRES  1966     C DISKECTOMY,PERCUTANEOUS LUMBAR  07/10/2007    L5-S1 with nerve root decompression     COLONOSCOPY  7/09, 9/19    Diverticulosis, Ct Colonography, due 5 yrs     ESOPHAGOSCOPY, GASTROSCOPY, DUODENOSCOPY (EGD), COMBINED N/A 03/15/2016    chronic gastritis     ESOPHAGOSCOPY, GASTROSCOPY, DUODENOSCOPY (EGD), COMBINED N/A 09/14/2021    Procedure: ESOPHAGOGASTRODUODENOSCOPY, WITH BIOPSies using biopsy forceps;  Surgeon: Tomasz Andres MD;  Location: [facility]     Fractured jaw  1966     ORTHOPEDIC SURGERY Right 2009    bunnionectomy     UPPER GI ENDOSCOPY  03/2016    Erythematous mucosa in the greater curvature.             Medications:     Prior to Admission medications    Medication Sig Start Date End Date Taking? Authorizing Provider   Ascorbic Acid 500 MG CHEW Take 500 mg by mouth daily   Yes Unknown, Entered By History   CALCIUM + D 600-200 MG-UNIT OR TABS Take 1 tablet by mouth 2 times daily    Yes Reported, Patient   cetirizine (ZYRTEC) 10 MG tablet Take 10 mg by mouth daily  8/22/21  Yes Reported, Patient   Cholecalciferol (VITAMIN D) 2000 UNITS tablet Take 2,000 Units by mouth daily 2/24/16  Yes Pa Montana MD   Doxylamine-DM 12.5-30 MG/10ML LIQD Take 10 mLs by mouth nightly as needed   Yes Reported, Patient   folic acid 0.8 MG CAPS    Yes Reported, Patient   magnesium 250 MG tablet Take 1 tablet by mouth daily. 10/4/12  Yes Weiler, Karen, MD   omeprazole (PRILOSEC) 40 MG DR capsule Take 1 capsule (40 mg) by mouth daily 8/30/21  Yes Pa Montana MD   BREO ELLIPTA 200-25 MCG/INH Inhaler INHALE ONE PUFF BY MOUTH ONCE DAILY 4/16/21   Rory Haque MD          Current Medications:           aspirin  81 mg Oral Daily     calcium carbonate 600 mg-vitamin D 400 units  2 tablet Oral BID w/meals     ceFAZolin  2 g Intravenous Pre-Op/Pre-procedure x 1  "dose     ceFAZolin  2 g Intravenous See Admin Instructions     famotidine  20 mg Oral BID     omeprazole  40 mg Oral Daily     polyethylene glycol  17 g Oral BID     sodium chloride (PF)  3 mL Intracatheter Q8H     acetaminophen **OR** acetaminophen, alum & mag hydroxide-simethicone, doxylamine **AND** dextromethorphan, HYDROmorphone, HYDROmorphone, lidocaine 4%, lidocaine (buffered or not buffered), naloxone **OR** naloxone **OR** naloxone **OR** naloxone, nitroGLYcerin, sodium chloride (PF)         Allergies:     Allergies   Allergen Reactions     Vioxx      stomach upset     Nickel Rash     Accompanied by itching and swelling          Social History:     Social History     Tobacco Use     Smoking status: Former Smoker     Smokeless tobacco: Never Used     Tobacco comment: quit age 33 - 1988, 1/2 PPD 8-10 yrs   Substance Use Topics     Alcohol use: Not Currently     Alcohol/week: 0.0 - 1.0 standard drinks     Comment: 1-2 per month          Family History:     Family History   Problem Relation Age of Onset     Breast Cancer Mother      Cancer Father         renal cell CA     Coronary Artery Disease Maternal Grandmother      Coronary Artery Disease Paternal Grandmother      Breast Cancer Maternal Aunt      Coronary Artery Disease Paternal Uncle    Gallbladder disease         Review of Systems:   The 12 point Review of Systems is negative other than noted in the HPI.         Physical Exam:   Blood pressure 109/72, pulse 84, temperature 97.7  F (36.5  C), temperature source Oral, resp. rate 17, height 1.651 m (5' 5\"), weight 51.8 kg (114 lb 3.2 oz), last menstrual period 08/08/2009, SpO2 97 %, not currently breastfeeding.  I/O last 3 completed shifts:  In: 240 [P.O.:240]  Out: -   General - This is a thin female in no apparent distress.  HEENT - Normocephalic. Atraumatic. Moist mucous membranes. Pupils equal.   Neck - Supple without masses or lymphadenopathy.  Lungs - Clear to ascultation bilaterally without crackles " or wheezing.    Heart - Regular rate & rhythm, unable to appreciate murmur.  Abdomen - Soft, nt, non-distended, +bowel sounds, no masses or organomegaly.  Extremities - Moves all extremities. No edema.  Neurologic - Nonfocal.  Skin - Warm and dry.          Data:   Labs:  Recent Labs   Lab Test 09/16/21  2027 09/15/21  1017 08/06/21  1115   WBC 2.2* 4.7 3.5*   HGB 12.3 12.5 14.3   HCT 36.5 37.3 41.4    192 206     Recent Labs   Lab Test 09/17/21 0626 09/16/21 2027 09/16/21  0648   POTASSIUM 4.2 3.8 4.4   CHLORIDE 111* 110* 109   CO2 26 26 27   BUN 13 14 16   CR 0.66 0.85 0.73     Recent Labs   Lab Test 09/17/21 0626 09/16/21 2027 09/16/21  0648 07/20/19  1016 06/13/18  1553 04/19/16  1715 12/03/15  1229   BILITOTAL 0.7 0.9 1.2   < > 0.5   < > 0.4   DBIL  --   --  0.4*  --  0.1  --   --    * 203* 260*   < > 31   < > 33   AST 76* 109* 200*   < > 25   < > 26   ALKPHOS 133 141 146   < > 100   < > 115   AMYLASE  --   --   --   --   --   --  56   LIPASE  --   --   --   --   --   --  229    < > = values in this interval not displayed.     CT scan of the abdomen: Gallbladder stones or sludge    HIDA: Normal radionuclide activity in liver, gallbladder, bile ducts and small bowel. No evidence of cystic or common duct obstruction or intrinsic liver disease. Gallbladder ejection fraction measures 13%, which is below the normal range of 35% or greater.  Findings suspicious for biliary dyskinesia         Assessment:   Biliary dyskinesia  Cholelithiasis  Elevated LFTs  Neutropenia         Plan:   - Plan for a laparoscopic cholecystectomy.  Procedure, risks, and recovery period briefly discussed with patient. The surgeon will discuss this further.  Patient agrees with the plan.  - Pre op orders in chart  - NPO, IVF, IV pain control PRN, Zosyn   - Protonix  - Dulcolax supp  - Medical management per hospitalist, appreciate your help  - Return to the floor per hospitalist due to fever last night    Thank you very much  for this consult.     Time spent: 30 minutes total time spent on the date of this encounter doing: chart review, review of test results, patient visit/obtaining a history, physical exam, education, counseling, developing plan of care, and documenting.     I have discussed the history, physical, and plan with Dr. Willis, who has independently interviewed and examined the patient and agrees with the plan as stated.     Marci Camara PA-C  [facility]  [phone]

## 2021-09-17 NOTE — ANESTHESIA PREPROCEDURE EVALUATION
Anesthesia Pre-Procedure Evaluation    Patient: Denia Dailey   MRN: [id number] : 1955        Preoperative Diagnosis: Biliary dyskinesia [K82.8]   Procedure : Procedure(s):  CHOLECYSTECTOMY, ROBOT-ASSISTED, LAPAROSCOPIC, WITH CHOLANGIOGRAM     Past Medical History:   Diagnosis Date     C. difficile colitis      CARDIOVASCULAR SCREENING; LDL GOAL LESS THAN 130      Chronic cough      Gastroparesis     mild/moderate - [facility]     GERD (gastroesophageal reflux disease)      Helicobacter pylori (H. pylori) infection      LPRD (laryngopharyngeal reflux disease)     Dr Hernandez - cough     Nephrolithiasis 2021    bilateral, small     Right leg numbness     Residual from lumbosacral neuritis     Thoracic or lumbosacral neuritis or radiculitis, unspecified 2007    Disc Herniation at L4-5, L5-S1     Unintentional weight loss       Past Surgical History:   Procedure Laterality Date     BUNIONECTOMY  2009     C CLOSED RX NOSE/JAW FRAC+WIRES  1966     C DISKECTOMY,PERCUTANEOUS LUMBAR  07/10/2007    L5-S1 with nerve root decompression     COLONOSCOPY  ,     Diverticulosis, Ct Colonography, due 5 yrs     ESOPHAGOSCOPY, GASTROSCOPY, DUODENOSCOPY (EGD), COMBINED N/A 03/15/2016    chronic gastritis     ESOPHAGOSCOPY, GASTROSCOPY, DUODENOSCOPY (EGD), COMBINED N/A 2021    Procedure: ESOPHAGOGASTRODUODENOSCOPY, WITH BIOPSies using biopsy forceps;  Surgeon: Tomasz Andres MD;  Location: [facility]     Fractured jaw  1966     ORTHOPEDIC SURGERY Right 2009    bunnionectomy     UPPER GI ENDOSCOPY  2016    Erythematous mucosa in the greater curvature.       Allergies   Allergen Reactions     Vioxx      stomach upset     Nickel Rash     Accompanied by itching and swelling      Social History     Tobacco Use     Smoking status: Former Smoker     Smokeless tobacco: Never Used     Tobacco comment: quit age 33 - , 1/2 PPD 8-10 yrs   Substance Use Topics     Alcohol use: Not Currently     Alcohol/week:  0.0 - 1.0 standard drinks     Comment: 1-2 per month      Wt Readings from Last 1 Encounters:   09/15/21 51.8 kg (114 lb 3.2 oz)        Anesthesia Evaluation   Pt has had prior anesthetic.     History of anesthetic complications   laryngospasm.    ROS/MED HX  ENT/Pulmonary: Comment: Chronic cough    (+) tobacco use, Past use,     Neurologic:  - neg neurologic ROS     Cardiovascular:     (+) -----valvular problems/murmurs type: MVP and MR  (-) hypertension   METS/Exercise Tolerance:     Hematologic:       Musculoskeletal:       GI/Hepatic:     (+) GERD, cholecystitis/cholelithiasis,     Renal/Genitourinary:     (+) Nephrolithiasis ,     Endo:    (-) Type II DM   Psychiatric/Substance Use:       Infectious Disease:       Malignancy:       Other:            Physical Exam    Airway        Mallampati: II   TM distance: > 3 FB   Neck ROM: full   Mouth opening: > 3 cm    Respiratory Devices and Support         Dental  no notable dental history         Cardiovascular   cardiovascular exam normal          Pulmonary   pulmonary exam normal                OUTSIDE LABS:  CBC:   Lab Results   Component Value Date    WBC 2.2 (L) 09/16/2021    WBC 4.7 09/15/2021    HGB 12.3 09/16/2021    HGB 12.5 09/15/2021    HCT 36.5 09/16/2021    HCT 37.3 09/15/2021     09/16/2021     09/15/2021     BMP:   Lab Results   Component Value Date     09/17/2021     09/16/2021    POTASSIUM 4.2 09/17/2021    POTASSIUM 3.8 09/16/2021    CHLORIDE 111 (H) 09/17/2021    CHLORIDE 110 (H) 09/16/2021    CO2 26 09/17/2021    CO2 26 09/16/2021    BUN 13 09/17/2021    BUN 14 09/16/2021    CR 0.66 09/17/2021    CR 0.85 09/16/2021    GLC 92 09/17/2021     (H) 09/16/2021     COAGS:   Lab Results   Component Value Date    PTT 27 06/24/2016    INR 0.97 06/24/2016     POC:   Lab Results   Component Value Date     (H) 07/10/2007    HCG Negative 10/11/2005     HEPATIC:   Lab Results   Component Value Date    ALBUMIN 2.9 (L)  09/17/2021    PROTTOTAL 6.1 (L) 09/17/2021     (H) 09/17/2021    AST 76 (H) 09/17/2021    ALKPHOS 133 09/17/2021    BILITOTAL 0.7 09/17/2021     OTHER:   Lab Results   Component Value Date    LACT 0.8 09/16/2021    KEYA 8.5 09/17/2021    LIPASE 229 12/03/2015    AMYLASE 56 12/03/2015    TSH 0.68 05/24/2021    SED 6 07/07/2007       Anesthesia Plan    ASA Status:  2   NPO Status:  NPO Appropriate    Anesthesia Type: General.     - Airway: ETT   Induction: Intravenous, RSI, Propofol.   Maintenance: Balanced.        Consents    Anesthesia Plan(s) and associated risks, benefits, and realistic alternatives discussed. Questions answered and patient/representative(s) expressed understanding.     - Discussed with:  Patient         Postoperative Care    Pain management: Multi-modal analgesia.   PONV prophylaxis: Ondansetron (or other 5HT-3), Background Propofol Infusion, Dexamethasone or Solumedrol     Comments:                Alena Seo MD

## 2021-09-17 NOTE — PROGRESS NOTES
Observation goals PRIOR TO DISCHARGE        Comments: List all goals to be met before discharge home:     - Serial troponins and stress test complete-Met     - Seen and cleared by consultant if applicable -N/A    - Adequate pain control on oral analgesia -Met    - Vital signs normal or at patient baseline -Not met,spiked temp.    - Safe disposition plan has been identified -Met    - Nurse to notify provider when observation goals have been met and patient is ready for      A&Ox4 IND O2=RA. Diet=regular no caffeine but NPO at 0000. Denies pain, N/V. Does not recall last BM. Bowel meds requested. IV=sl. BS=audible. LS=Clear. TELE+SR. Planning to discharge tomorrow possibly.

## 2021-09-17 NOTE — PROVIDER NOTIFICATION
MD Notification    Notified Person: MD    Notified Person Name:Barney Brown MD.     Notification Date/Time:09/16/21 @ 2157    Notification Interaction: amcom    Purpose of Notification:Obs 27 V. Ashlie pt does not recall last BM, requesting miralax orders please.  April RN *[phone]    Orders Received:    Comments:

## 2021-09-18 VITALS
WEIGHT: 114.2 LBS | HEIGHT: 65 IN | HEART RATE: 82 BPM | BODY MASS INDEX: 19.03 KG/M2 | SYSTOLIC BLOOD PRESSURE: 97 MMHG | TEMPERATURE: 98 F | DIASTOLIC BLOOD PRESSURE: 66 MMHG | RESPIRATION RATE: 18 BRPM | OXYGEN SATURATION: 100 %

## 2021-09-18 LAB
ALBUMIN SERPL-MCNC: 2.6 G/DL (ref 3.4–5)
ALP SERPL-CCNC: 107 U/L (ref 40–150)
ALT SERPL W P-5'-P-CCNC: 132 U/L (ref 0–50)
ANION GAP SERPL CALCULATED.3IONS-SCNC: 2 MMOL/L (ref 3–14)
AST SERPL W P-5'-P-CCNC: 51 U/L (ref 0–45)
BILIRUB SERPL-MCNC: 0.8 MG/DL (ref 0.2–1.3)
BUN SERPL-MCNC: 12 MG/DL (ref 7–30)
CALCIUM SERPL-MCNC: 8.3 MG/DL (ref 8.5–10.1)
CHLORIDE BLD-SCNC: 106 MMOL/L (ref 94–109)
CO2 SERPL-SCNC: 29 MMOL/L (ref 20–32)
CREAT SERPL-MCNC: 0.71 MG/DL (ref 0.52–1.04)
ERYTHROCYTE [DISTWIDTH] IN BLOOD BY AUTOMATED COUNT: 12.3 % (ref 10–15)
GFR SERPL CREATININE-BSD FRML MDRD: 90 ML/MIN/1.73M2
GLUCOSE BLD-MCNC: 108 MG/DL (ref 70–99)
HCT VFR BLD AUTO: 33.1 % (ref 35–47)
HGB BLD-MCNC: 11.4 G/DL (ref 11.7–15.7)
MCH RBC QN AUTO: 30.2 PG (ref 26.5–33)
MCHC RBC AUTO-ENTMCNC: 34.4 G/DL (ref 31.5–36.5)
MCV RBC AUTO: 88 FL (ref 78–100)
PLATELET # BLD AUTO: 200 10E3/UL (ref 150–450)
POTASSIUM BLD-SCNC: 4.1 MMOL/L (ref 3.4–5.3)
PROT SERPL-MCNC: 5.7 G/DL (ref 6.8–8.8)
RBC # BLD AUTO: 3.77 10E6/UL (ref 3.8–5.2)
SODIUM SERPL-SCNC: 137 MMOL/L (ref 133–144)
WBC # BLD AUTO: 6.9 10E3/UL (ref 4–11)

## 2021-09-18 PROCEDURE — 250N000013 HC RX MED GY IP 250 OP 250 PS 637: Performed by: PHYSICIAN ASSISTANT

## 2021-09-18 PROCEDURE — 85027 COMPLETE CBC AUTOMATED: CPT | Performed by: INTERNAL MEDICINE

## 2021-09-18 PROCEDURE — 80053 COMPREHEN METABOLIC PANEL: CPT | Performed by: INTERNAL MEDICINE

## 2021-09-18 PROCEDURE — 36415 COLL VENOUS BLD VENIPUNCTURE: CPT | Performed by: INTERNAL MEDICINE

## 2021-09-18 PROCEDURE — 99239 HOSP IP/OBS DSCHRG MGMT >30: CPT | Performed by: INTERNAL MEDICINE

## 2021-09-18 RX ORDER — MIRTAZAPINE 7.5 MG/1
15 TABLET, FILM COATED ORAL AT BEDTIME
Qty: 30 TABLET | Refills: 0 | Status: SHIPPED | OUTPATIENT
Start: 2021-09-18 | End: 2021-10-28

## 2021-09-18 RX ORDER — HYDROMORPHONE HYDROCHLORIDE 2 MG/1
2 TABLET ORAL EVERY 4 HOURS PRN
Qty: 5 TABLET | Refills: 0 | Status: SHIPPED | OUTPATIENT
Start: 2021-09-18 | End: 2021-09-23

## 2021-09-18 RX ADMIN — ACETAMINOPHEN 650 MG: 325 TABLET, FILM COATED ORAL at 13:59

## 2021-09-18 RX ADMIN — SENNOSIDES AND DOCUSATE SODIUM 1 TABLET: 8.6; 5 TABLET ORAL at 09:05

## 2021-09-18 RX ADMIN — POLYETHYLENE GLYCOL 3350 17 G: 17 POWDER, FOR SOLUTION ORAL at 09:06

## 2021-09-18 RX ADMIN — ACETAMINOPHEN 650 MG: 325 TABLET, FILM COATED ORAL at 02:52

## 2021-09-18 RX ADMIN — Medication 2 TABLET: at 09:05

## 2021-09-18 RX ADMIN — ASPIRIN 81 MG: 81 TABLET, COATED ORAL at 09:05

## 2021-09-18 RX ADMIN — OMEPRAZOLE 40 MG: 20 CAPSULE, DELAYED RELEASE ORAL at 09:05

## 2021-09-18 ASSESSMENT — ACTIVITIES OF DAILY LIVING (ADL)
ADLS_ACUITY_SCORE: 6
ADLS_ACUITY_SCORE: 4
ADLS_ACUITY_SCORE: 6
ADLS_ACUITY_SCORE: 6

## 2021-09-18 NOTE — ANESTHESIA POSTPROCEDURE EVALUATION
Patient: Denia Dailey    Procedure(s):  CHOLECYSTECTOMY, ROBOT-ASSISTED, LAPAROSCOPIC, WITH CHOLANGIOGRAM    Diagnosis:Biliary dyskinesia [K82.8]  Diagnosis Additional Information: No value filed.    Anesthesia Type:  General    Note:  Disposition: Admission   Postop Pain Control: Uneventful            Sign Out: Well controlled pain   PONV: No   Neuro/Psych: Uneventful            Sign Out: Acceptable/Baseline neuro status   Airway/Respiratory: Uneventful            Sign Out: Acceptable/Baseline resp. status   CV/Hemodynamics: Uneventful            Sign Out: Acceptable CV status   Other NRE: NONE   DID A NON-ROUTINE EVENT OCCUR? No    Event details/Postop Comments:  Stable and doing well prior to transfer to floor.               Last vitals:  Vitals Value Taken Time   /75 09/17/21 1530   Temp 36.6  C (97.9  F) 09/17/21 1530   Pulse 75 09/17/21 1533   Resp 25 09/17/21 1533   SpO2 100 % 09/17/21 1535   Vitals shown include unvalidated device data.    Electronically Signed By: Magan Cruz MD  September 17, 2021  8:33 PM

## 2021-09-18 NOTE — PLAN OF CARE
AVS give to patient, all questions answered. All belongings and medications with patient. Patient escorted to ride in wheelchair by RN.

## 2021-09-18 NOTE — DISCHARGE SUMMARY
[facility]  Discharge Summary        Denia Dailey MRN# [id number]   YOB: 1955 Age: 66 year old     Date of Admission:  9/15/2021  Date of Discharge:  9/18/2021  Admitting Physician:  Sadia De Guzman MD  Discharge Physician: Sadia De Guzman MD  Discharging Service: Hospitalist     Primary Provider: Pa Montana  Primary Care Physician Phone Number: [phone]         Discharge Diagnoses/Problem Oriented Hospital Course (Providers):    Denia Dailey was admitted on 9/15/2021 by Sadia De Guzman MD and I would refer you to their history and physical.  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        Chest pain most likely from biliary dyskinesia with symptomatic cholelithiasis and biliary colic contributing S/p lap cholecystectomy on 9/17/21  Elevated LFTs  Febrile illness  Patient presents with sudden onset chest pain described a ache with associated diaphoresis.  D-dimer was mildly elevated but chest CT was negative for pulmonary embolus or other causes of chest pain.  Troponin x3 and EKG negative.  LFT on 9/16 show elevated transaminases in 200s (recently normal 5/2021). Bili 1.2.  CT also noted gallbladder stones vs sludge. Denies significant alcohol or tylenol use. Abdominal exam is benign.   She has not had recurrence of her pain since admission.   -Stress echo 9/16 with negative EKG portion, unable to fully assess for presence/absence of WMA  -HIDA scan was done on 9/16/2021 due to elevated LFTs and her symptoms of chest pain and diaphoresis and ongoing nausea and inability to eat.   HIDA scan returned the abnormal with suggesting biliary dyskinesia  General  surgery consulted and laparoscopic cholecystectomy done on 9/17/21  She had a fever of 101  F overnight on 9/16/21 blood cultures has been sent.  Chest CT was negative for pneumonia on admission.   Blood cultures have been obtained overnight are pending  Started Zosyn empirically .  We will plan on 7-day course of oral augmentib antibiotics on discharge         Hematuria   UA showed moderate blood otherwise negative. Repeat UA in 1week if still blood present in urine needs out patient follow up with Urology     GERD.  Poor appetite and weight loss   -Continue daily omeprazole, reports current symptoms not consistent with GERD sxs   Pt wants trial a course of Remeron to see if it will improve her appetite      Moderate mitral regurgitation.  Seen on recent echocardiogram.  -Follow-up with cardiology as arranged     Unintentional weight loss.  Has lost 40 pounds with early satiety.  Gastric emptying study showed decreased motility but her to doctors disagree with how much this is impacting her weight loss.  EGD showed some flattening of the stomach folds but was otherwise normal.  Biopsies were taken.  Does have a history of H. pylori and developed C. difficile a few months after treatment.  -Await biopsy results and follow-up with GI     Chronic cough.  -Continue inhaler as outpatient  -Continue nighttime Robitussin cough medication as needed nightly     Hypocalcemia.  Admission calcium level was 7.9.  She does take calcium and vitamin D supplements. Improved on recheck with normal ionized calcium. Continue supplements at discharge.      DVT Prophylaxis: Pneumatic Compression Devices  Code Status: Full Code      Disposition: home today in stable condition    Sadia De Guzman MD   Page [phone](7AM-6PM)                 Code Status:      Full Code        Brief Hospital Stay Summary Sent Home With Patient in AVS:        Reason for your hospital stay      You were here for evaluation of chest pain                  Important Results:      See below         Pending Results:        Unresulted Labs Ordered in the Past 30 Days of this Admission     Date and Time Order Name Status Description    9/17/2021  1:48 PM Surgical Pathology Exam In process     9/16/2021  7:28 PM Blood Culture Hand, Left Preliminary     9/16/2021  7:28 PM Blood Culture Arm, Right Preliminary             Discharge Instructions and Follow-Up:      Follow-up Appointments     Follow-up and recommended labs and tests       Follow up with primary care provider within 5 days for hospital follow   up. Recheck liver function tests. Revisit conversation regarding CT   coronary angiogram if recurrence of symptoms.     Continue routine follow up with GI               Discharge Disposition:      Discharged to home        Discharge Medications:        Current Discharge Medication List      START taking these medications    Details   amoxicillin-clavulanate (AUGMENTIN) 875-125 MG tablet Take 1 tablet by mouth 2 times daily for 6 days  Qty: 12 tablet, Refills: 0    Associated Diagnoses: Febrile illness      mirtazapine (REMERON) 7.5 MG tablet Take 2 tablets (15 mg) by mouth At Bedtime Take 7.5mg PO daily for 3days then increase to 15mg PO daily  Qty: 30 tablet, Refills: 0    Associated Diagnoses: Biliary dyskinesia         CONTINUE these medications which have NOT CHANGED    Details   Ascorbic Acid 500 MG CHEW Take 500 mg by mouth daily      CALCIUM + D 600-200 MG-UNIT OR TABS Take 1 tablet by mouth 2 times daily       cetirizine (ZYRTEC) 10 MG tablet Take 10 mg by mouth daily       Cholecalciferol (VITAMIN D) 2000 UNITS tablet Take 2,000 Units by mouth daily  Qty: 100 tablet, Refills: 3      Doxylamine-DM 12.5-30 MG/10ML LIQD Take 10 mLs by mouth nightly as needed      folic acid 0.8 MG CAPS       magnesium 250 MG tablet Take 1 tablet by mouth daily.  Qty: 100 tablet, Refills: 3      omeprazole (PRILOSEC) 40 MG DR capsule Take 1 capsule (40 mg) by mouth daily  Qty: 180  "capsule, Refills: 3    Associated Diagnoses: Weight loss; Early satiety; Gastroesophageal reflux disease with esophagitis without hemorrhage; Gastroparesis      BREO ELLIPTA 200-25 MCG/INH Inhaler INHALE ONE PUFF BY MOUTH ONCE DAILY  Qty: 60 each, Refills: 9    Associated Diagnoses: Chronic cough               Allergies:         Allergies   Allergen Reactions     Vioxx      stomach upset     Nickel Rash     Accompanied by itching and swelling           Consultations This Hospital Stay:      Consultation during this admission received from surgery        Condition and Physical on Discharge:      Discharge condition: Stable   Vitals: Blood pressure 97/66, pulse 82, temperature 98  F (36.7  C), temperature source Oral, resp. rate 18, height 1.651 m (5' 5\"), weight 51.8 kg (114 lb 3.2 oz), last menstrual period 08/08/2009, SpO2 100 %, not currently breastfeeding.     Constitutional: Alert, awake, NAD    Lungs: CTA   Cardiovascular: RRR, 2+ murmur    Abdomen: Soft, NT, ND, BS+   Skin: Warm and dry    Other:          Discharge Time:      Greater than 30 minutes.        Image Results From This Hospital Stay (For Non-EPIC Providers):        Results for orders placed or performed during the hospital encounter of 09/15/21   CT Chest Pulmonary Embolism w Contrast    Narrative    CT CHEST PULMONARY EMBOLISM WITH CONTRAST  9/15/2021 11:13 AM    CLINICAL HISTORY: Dyspnea. Positive D-dimer.    TECHNIQUE: CT angiogram chest during arterial phase injection IV  contrast. 2D and 3D MIP reconstructions were performed by the CT  technologist. Dose reduction techniques were used.     CONTRAST: 56 mL Isovue-370    COMPARISON: None.    FINDINGS:  ANGIOGRAM CHEST: Pulmonary arteries are normal caliber and negative  for pulmonary emboli. Thoracic aorta is negative for dissection. No CT  evidence of right heart strain.    LUNGS AND PLEURA: Calcified granulomas noted in the lower lobes and  right frontal lobe. Mild dependent atelectasis at the " lung bases. No  pleural fluid.    MEDIASTINUM/AXILLAE: Heart is normal in size. No pericardial fluid.  Calcified mediastinal and left hilar lymph nodes are present  consistent with old granulomatous disease. No enlarged lymph nodes are  appreciated. Thyroid gland appears normal in size. Esophagus is  unremarkable.    UPPER ABDOMEN: Gallstones or gallbladder sludge noted. No gallbladder  inflammation is appreciated.    MUSCULOSKELETAL: Degenerative spine changes. No destructive bone  lesions.      Impression    IMPRESSION:  1.  No pulmonary embolism. Thoracic aorta is unremarkable.    2.  Evidence of old granulomatous disease.    3.  Gallbladder stones or sludge. Follow-up gallbladder ultrasound if  clinically warranted for further assessment.    MARCIAL JOSEPH MD         SYSTEM ID:  [id number]   NM Hepatobiliary Scan w GB EF    Narrative    EXAM: NM HEPATOBILIARY SCAN WITH GB EF  LOCATION: [facility]  DATE/TIME: 2021 1:57 PM    INDICATION: Right upper quadrant abdominal pain  COMPARISON: CT the chest and pelvis dated 2021  TECHNIQUE: 6.2 mCi of technetium-99m mebrofenin, IV. Anterior planar imaging of the abdomen. 1.0 mcg of cholecystokinin analog, IV. Gallbladder imaging for 30-60 minutes.    FINDINGS: Normal radionuclide activity in liver, gallbladder, bile ducts and small bowel. No evidence of cystic or common duct obstruction or intrinsic liver disease. Gallbladder ejection fraction measures 13%, which is below the normal range of 35% or   greater.      Impression    IMPRESSION:     Findings suspicious for biliary dyskinesia.   Echo Stress Echocardiogram    Narrative    [id number]  [id number]  [id number]  [id number]^TAMMI^ENRIQUE^ANTONIA     [facility]  Echocardiography Laboratory  [address] [place] [address]     Name: FREDDIE ZHONG  MRN: [id number]  : 1955  Study Date: 2021 08:02 AM  Age: 66 yrs  Gender: Female  Patient Location: [facility]  Reason For  Study: Chest Pain  Ordering Physician: ENRIQUE CADENA  Referring Physician: RUFUS BRAVO  Performed By: Joselyn Benitez RDCS     BSA: 1.6 m2  Height: 65 in  Weight: 114 lb  HR: 115  BP: 88/64 mmHg  ______________________________________________________________________________  Procedure  Stress Echo Complete.  ______________________________________________________________________________  Interpretation Summary  The patient exhibited no chest pain during exercise.  The Duke treadmill score was low risk ( >5 Duke score).  Suboptimal image quality precludes assessment of wall motion abnormalities  with stress.  In the stress images there was no true apical four chamber view (the aortic  valve is seen throughout the series of images). Therefore, the anterolateral  wall is not seen in the apical acoustic window on this study. The basal  anterior wall endocardium is not seen in the apical two chamber view. The  parasternal short axis images were captured outside of the 60 seconds into  recovery which will reduce accuracy.  This was a normal stress EKG with no evidence of stress-induced ischemia.  ______________________________________________________________________________  Stress  The patient exercised 9:00.  The patient exhibited no chest pain during exercise.  Exercise was stopped due to fatigue.  There was a normal BP response to exercise.  A treadmill exercise test according to the Yonis protocol was performed.  Target Heart Rate was achieved.  The Duke treadmill score was low risk ( >5 Duke score).  The EKG portion of this stress test was negative for inducible ischemia (see  echo results below).  Arrhythmia induced during stress: 3-beat run of ventricular tachycardia.  Arrhythmia noted in recovery: occasional PVC's.  Suboptimal image quality precludes assessment of wall motion abnormalities  with stress.  The visual ejection fraction is estimated at 65-70%.  Left ventricular cavity size decreases with  exercise.  Global LV systolic function augments with exercise.  In the stress images there was no true apical four chamber view (the aortic  valve is seen throughout the series of images). Therefore, the anterolateral  wall is not seen in the apical acoustic window on this study. The basal  anterior wall endocardium is not seen in the apical two chamber view. The  parasternal short axis images were captured outside of the 60 seconds into  recovery which will reduce accuracy.     Baseline  The patient is in normal sinus rhythm.  Resting ECG is normal.  Regional wall motion abnormalities cannot be excluded due to limited  visualization.  The visual ejection fraction is estimated at 55-60%.  There was no true apical 4 chamber view and so the anterolateral wall was not  seen at rest. The basal anterior wall endocardium was not seen. All other  walls contract normally.     Stress Results         Protocol:  Yonis Protocol        Maximum Predicted HR:   155 bpm         Target HR: 132 bpm               % Maximum Predicted HR: 97 %            Stage  DurationHeart Rate  BP                Comment                (mm:ss)   (bpm)        Stage 1   3:00     130    114/64        Stage 2   3:00     142    126/66        Stage 3   3:00     150    134/70RPP: 20,100; FAC: ABOVE AVG; DUKE:9       RECOVERYR  4:00     118    96/62             Stress Duration:   9:00 mm:ss        Recovery Time: 4:00 mm:ss          Maximum Stress HR: 150 bpm           METS:          10     Mitral Valve  There is moderate (2+) mitral regurgitation.     Tricuspid Valve  There is trace tricuspid regurgitation. The right ventricular systolic  pressure is approximated at 16.1 mmHg plus the right atrial pressure.     Aortic Valve  The aortic valve is not well visualized. There is trace aortic regurgitation.  No hemodynamically significant valvular aortic stenosis.  ______________________________________________________________________________  Doppler Measurements  & Calculations  TR max daniel: 200.9 cm/sec  TR max P.1 mmHg     ______________________________________________________________________________  Report approved by: Kailey Vegas 2021 09:18 AM                 Most Recent Lab Results In EPIC (For Non-EPIC Providers):    Most Recent 3 CBC's:  Recent Labs   Lab Test 21  0611 09/16/21  2027 09/15/21  1017   WBC 6.9 2.2* 4.7   HGB 11.4* 12.3 12.5   MCV 88 91 91    198 192      Most Recent 3 BMP's:  Recent Labs   Lab Test 21  0621  0626 21    141 141   POTASSIUM 4.1 4.2 3.8   CHLORIDE 106 111* 110*   CO2 29 26 26   BUN 12 13 14   CR 0.71 0.66 0.85   ANIONGAP 2* 4 5   KEYA 8.3* 8.5 8.6   * 92 110*     Most Recent 3 Troponin's:  Recent Labs   Lab Test 09/15/21  2024 09/15/21  1656 09/15/21  1017   TROPONIN <0.015 <0.015 <0.015     Most Recent 3 INR's:  Recent Labs   Lab Test 16  0737   INR 0.97     Most Recent 2 LFT's:  Recent Labs   Lab Test 21  0611 21  0626   AST 51* 76*   * 170*   ALKPHOS 107 133   BILITOTAL 0.8 0.7     Most Recent Cholesterol Panel:  Recent Labs   Lab Test 21  0937   CHOL 202*   *   HDL 60   TRIG 127     Most Recent 6 Bacteria Isolates From Any Culture (See EPIC Reports for Culture Details):  Recent Labs   Lab Test 18  1519 16  0920 16  0917 14  0953   CULT No beta hemolytic Streptococcus Group A isolated Culture negative for acid fast bacilli  Assayed at [facility] [address]    Culture negative after 4 weeks  Light growth Normal respiratory pretty Culture negative for acid fast bacilli  Assayed at [facility] [address]    No growth after 4 weeks  Culture negative after 4 weeks  Light growth Normal respiratory pretty <10,000 colonies/mL mixed urogenital pretty  Susceptibility testing not routinely done       Most Recent TSH, T4 and HgbA1c:   Recent Labs   Lab Test  05/24/21  0937   TSH 0.68

## 2021-09-18 NOTE — PLAN OF CARE
A/O x4. VSS on RA. Continuous pulse ox. Refused capno. Clear liquid diet, no caffeine. C/o headache, Tylenol given w/ relief. 4 lap sites, CDI. SBA. Denies N/V, chest pain, diaphoresis. Voiding, no BM but able to pass gas. Possible discharge today. PIV SL. Continue to monitor.

## 2021-09-18 NOTE — PLAN OF CARE
Pt A&OX4. Afebrile, VSS on RA. Clear liquid, No caffeine. Pain managed with prn tylenol and IV dilaudid X 1. Denies Chest pain/N/V. Prn Delsym given for cough. 4 abdominal incision CDI. Up SBA to bathroom, voiding adequately. Ambulated once in the hallway. No BM, passing gas. Refused Capno. On continuous pulse ox. Tele- NSR. PIV SL. Possible discharge tomorrow. Continue to monitor.

## 2021-09-18 NOTE — PROGRESS NOTES
[facility]    General Surgery  Daily Progress Note       Assessment and Plan:   Denia Dailey is a 66 year old female 1 day s/p robotic cholecystectomy with cholangiogram    PLAN:  - LFTs improved and white count wnl this morning. No further antibiotics needed from surgical standpoint.   - Hospitalist team work up of neutropenia.  - Pain controlled without narcotics.  - Diet as tolerated.  - Deep breathe, IS, and ambulate QID.    DISPOSITION:  - Okay for discharge home from surgical standpoint if tolerates low fat diet this morning.  - Discharge orders in place and instructions reviewed. Patient to call our clinic if requesting longer leave from work given lifting restrictions  - Telephone follow up in 2 weeks.        Interval History:   Denia Dailey is seen this morning on surgical rounds. She is tolerating liquid diet and has ordered low fat diet for lunch. She has been up walking this morning and pain controlled without narcotics. She is passing flatus. No questions or concerns this morning.         Physical Exam:   Temp: 98  F (36.7  C) Temp src: Oral BP: 97/66 Pulse: 82   Resp: 18 SpO2: 100 % O2 Device: None (Room air) Oxygen Delivery: 6 LPM    I/O last 3 completed shifts:  In: 1400 [P.O.:500; I.V.:900]  Out: -     GENERAL: VS reviewed, alert, oriented, no acute distress  LUNGS: Normal respiratory effort, no wheezing  ABDOMEN:  Soft, nontender, non-distended and good bowel sounds  INCISION: Steris and bandaids in place. Clean, dry, and intact. No surrounding erythema.  EXTREMITIES: Moving all extremities, no gross deformities, well perfused, no lower extremity edema or tenderness  NEUROLOGICAL: Grossly non-focal, mood & affect appropriate    Data   Recent Labs   Lab 09/18/21  0611 09/17/21  0626 09/16/21 2027 09/16/21 2027 09/16/21  0648 09/15/21  2024 09/15/21  1656 09/15/21  1017   WBC 6.9  --   --  2.2*  --   --   --  4.7   HGB 11.4*  --   --  12.3  --   --   --  12.5   MCV  88  --   --  91  --   --   --  91     --   --  198  --   --   --  192    141  --  141   < >  --   --  136   POTASSIUM 4.1 4.2  --  3.8   < >  --   --  3.7   CHLORIDE 106 111*  --  110*   < >  --   --  104   CO2 29 26  --  26   < >  --   --  29   BUN 12 13  --  14   < >  --   --  13   CR 0.71 0.66  --  0.85   < >  --   --  0.82   ANIONGAP 2* 4  --  5   < >  --   --  3   KEYA 8.3* 8.5  --  8.6   < >  --   --  7.9*   * 92  --  110*   < >  --   --  156*   ALBUMIN 2.6* 2.9*   < > 3.0*   < >  --   --   --    PROTTOTAL 5.7* 6.1*   < > 6.3*   < >  --   --   --    BILITOTAL 0.8 0.7   < > 0.9   < >  --   --   --    ALKPHOS 107 133   < > 141   < >  --   --   --    * 170*   < > 203*   < >  --   --   --    AST 51* 76*   < > 109*   < >  --   --   --    TROPONIN  --   --   --   --   --  <0.015 <0.015 <0.015    < > = values in this interval not displayed.       Nya Archuleta PA-C

## 2021-09-20 ENCOUNTER — PATIENT OUTREACH (OUTPATIENT)
Dept: CARE COORDINATION | Facility: CLINIC | Age: 66
End: 2021-09-20

## 2021-09-20 DIAGNOSIS — Z71.89 OTHER SPECIFIED COUNSELING: ICD-10-CM

## 2021-09-20 LAB
PATH REPORT.COMMENTS IMP SPEC: NORMAL
PATH REPORT.COMMENTS IMP SPEC: NORMAL
PATH REPORT.FINAL DX SPEC: NORMAL
PATH REPORT.GROSS SPEC: NORMAL
PATH REPORT.MICROSCOPIC SPEC OTHER STN: NORMAL
PATH REPORT.RELEVANT HX SPEC: NORMAL
PHOTO IMAGE: NORMAL

## 2021-09-20 PROCEDURE — 88304 TISSUE EXAM BY PATHOLOGIST: CPT | Mod: 26 | Performed by: PATHOLOGY

## 2021-09-20 NOTE — PROGRESS NOTES
Clinic Care Coordination Contact  [facility]: Post-Discharge Note  SITUATION                                                      Admission:    Admission Date: 09/15/21   Reason for Admission: Chest pain most likely from biliary dyskinesia with symptomatic cholelithiasis and biliary colic contributing S/p lap cholecystectomy on 9/17/21Elevated LFTs  Discharge:   Discharge Date: 09/18/21  Discharge Diagnosis: Chest pain most likely from biliary dyskinesia with symptomatic cholelithiasis and biliary colic contributing S/p lap cholecystectomy on 9/17/21Elevated LFTs    BACKGROUND                                                      Patient presents with sudden onset chest pain described a ache with associated diaphoresis.  D-dimer was mildly elevated but chest CT was negative for pulmonary embolus or other causes of chest pain.  Troponin x3 and EKG negative.  LFT on 9/16 show elevated transaminases in 200s (recently normal 5/2021). Bili 1.2.  CT also noted gallbladder stones vs sludge. Denies significant alcohol or tylenol use. Abdominal exam is benign.   She has not had recurrence of her pain since admission.   -Stress echo 9/16 with negative EKG portion, unable to fully assess for presence/absence of WMA  -HIDA scan was done on 9/16/2021 due to elevated LFTs and her symptoms of chest pain and diaphoresis and ongoing nausea and inability to eat.   HIDA scan returned the abnormal with suggesting biliary dyskinesia  General surgery consulted and laparoscopic cholecystectomy done on 9/17/21  She had a fever of 101  F overnight on 9/16/21 blood cultures has been sent.  Chest CT was negative for pneumonia on admission.   Blood cultures have been obtained overnight are pending  Started Zosyn empirically .  We will plan on 7-day course of oral augmentib antibiotics on discharge           Hematuria   UA showed moderate blood otherwise negative. Repeat UA in 1week if still blood present in urine needs out patient follow  up with Urology      GERD.  Poor appetite and weight loss   -Continue daily omeprazole, reports current symptoms not consistent with GERD sxs   Pt wants trial a course of Remeron to see if it will improve her appetite      Moderate mitral regurgitation.  Seen on recent echocardiogram.  -Follow-up with cardiology as arranged     Unintentional weight loss.  Has lost 40 pounds with early satiety.  Gastric emptying study showed decreased motility but her to doctors disagree with how much this is impacting her weight loss.  EGD showed some flattening of the stomach folds but was otherwise normal.  Biopsies were taken.  Does have a history of H. pylori and developed C. difficile a few months after treatment.  -Await biopsy results and follow-up with GI     Chronic cough.  -Continue inhaler as outpatient  -Continue nighttime Robitussin cough medication as needed nightly     Hypocalcemia.  Admission calcium level was 7.9.  She does take calcium and vitamin D supplements. Improved on recheck with normal ionized calcium. Continue supplements at discharge.      DVT Prophylaxis: Pneumatic Compression Devices  Code Status: Full Code        ASSESSMENT      Enrollment  Primary Care Care Coordination Status: Declined    Discharge Assessment  How are you doing now that you are home?: Good  How are your symptoms? (Red Flag symptoms escalate to triage hotline per guidelines): Improved  Do you feel your condition is stable enough to be safe at home until your provider visit?: Yes  Does the patient have their discharge instructions? : Yes  Does the patient have questions regarding their discharge instructions? : No  Were you started on any new medications or were there changes to any of your previous medications? : Yes  Does the patient have all of their medications?: Yes  Do you have questions regarding any of your medications? : Yes (see comment) (Wait and speak to Dr Montana)  Discharge follow-up appointment scheduled within 14  calendar days? : Yes  Discharge Follow Up Appointment Date: 09/23/21  Discharge Follow Up Appointment Scheduled with?: Primary Care Provider    Post-op ([facility] Only)  If the patient had a surgery or procedure, do they have any questions for a nurse?: Yes (see comment) (lap cholecystectomy)    Post-op (Clinicians Only)  Fever: No  Chills: No  Eating & Drinking: eating and drinking without complaints/concerns  PO Intake: low fiber;low fat diet  Bowel Function: constipation  Date of last BM:  (ongoing)  Urinary Status: voiding without complaint/concerns        PLAN                                                      Outpatient Plan:  Follow up with primary care provider within 5 days for hospital follow   up. Recheck liver function tests. Revisit conversation regarding CT   coronary angiogram if recurrence of symptoms.      Continue routine follow up with GI            Future Appointments   Date Time Provider Department [place]   9/23/2021 11:30 AM Pa Montana MD [facility]   10/4/2021 11:15 AM Alex Dixon MD [facility]         For any urgent concerns, please contact our 24 hour nurse triage line: [phone] ([phone])         Neyda Harris MA

## 2021-09-21 LAB
BACTERIA BLD CULT: NO GROWTH
BACTERIA BLD CULT: NO GROWTH

## 2021-09-22 ENCOUNTER — TELEPHONE (OUTPATIENT)
Dept: SURGERY | Facility: CLINIC | Age: 66
End: 2021-09-22

## 2021-09-23 ENCOUNTER — OFFICE VISIT (OUTPATIENT)
Dept: FAMILY MEDICINE | Facility: CLINIC | Age: 66
End: 2021-09-23
Payer: COMMERCIAL

## 2021-09-23 VITALS
SYSTOLIC BLOOD PRESSURE: 108 MMHG | TEMPERATURE: 97.1 F | DIASTOLIC BLOOD PRESSURE: 62 MMHG | WEIGHT: 114.1 LBS | RESPIRATION RATE: 16 BRPM | HEART RATE: 95 BPM | HEIGHT: 65 IN | OXYGEN SATURATION: 100 % | BODY MASS INDEX: 19.01 KG/M2

## 2021-09-23 DIAGNOSIS — Z51.81 MEDICATION MONITORING ENCOUNTER: ICD-10-CM

## 2021-09-23 DIAGNOSIS — R05.3 CHRONIC COUGH: ICD-10-CM

## 2021-09-23 DIAGNOSIS — K21.9 LPRD (LARYNGOPHARYNGEAL REFLUX DISEASE): ICD-10-CM

## 2021-09-23 DIAGNOSIS — R68.81 EARLY SATIETY: ICD-10-CM

## 2021-09-23 DIAGNOSIS — Z98.890 POSTOPERATIVE STATE: Primary | ICD-10-CM

## 2021-09-23 DIAGNOSIS — K31.84 GASTROPARESIS: ICD-10-CM

## 2021-09-23 DIAGNOSIS — R63.4 WEIGHT LOSS: ICD-10-CM

## 2021-09-23 DIAGNOSIS — K21.00 GASTROESOPHAGEAL REFLUX DISEASE WITH ESOPHAGITIS WITHOUT HEMORRHAGE: ICD-10-CM

## 2021-09-23 LAB
ALBUMIN UR-MCNC: NEGATIVE MG/DL
APPEARANCE UR: CLEAR
BACTERIA #/AREA URNS HPF: ABNORMAL /HPF
BILIRUB UR QL STRIP: NEGATIVE
COLOR UR AUTO: YELLOW
ERYTHROCYTE [DISTWIDTH] IN BLOOD BY AUTOMATED COUNT: 12.5 % (ref 10–15)
GLUCOSE UR STRIP-MCNC: NEGATIVE MG/DL
HCT VFR BLD AUTO: 38.5 % (ref 35–47)
HGB BLD-MCNC: 13 G/DL (ref 11.7–15.7)
HGB UR QL STRIP: NEGATIVE
KETONES UR STRIP-MCNC: NEGATIVE MG/DL
LEUKOCYTE ESTERASE UR QL STRIP: ABNORMAL
MCH RBC QN AUTO: 30.3 PG (ref 26.5–33)
MCHC RBC AUTO-ENTMCNC: 33.8 G/DL (ref 31.5–36.5)
MCV RBC AUTO: 90 FL (ref 78–100)
NITRATE UR QL: NEGATIVE
PH UR STRIP: 6 [PH] (ref 5–7)
PLATELET # BLD AUTO: 268 10E3/UL (ref 150–450)
RBC # BLD AUTO: 4.29 10E6/UL (ref 3.8–5.2)
RBC #/AREA URNS AUTO: ABNORMAL /HPF
SP GR UR STRIP: >=1.03 (ref 1–1.03)
SQUAMOUS #/AREA URNS AUTO: ABNORMAL /LPF
UROBILINOGEN UR STRIP-ACNC: 0.2 E.U./DL
WBC # BLD AUTO: 3.3 10E3/UL (ref 4–11)
WBC #/AREA URNS AUTO: ABNORMAL /HPF

## 2021-09-23 PROCEDURE — 87086 URINE CULTURE/COLONY COUNT: CPT | Performed by: FAMILY MEDICINE

## 2021-09-23 PROCEDURE — 85027 COMPLETE CBC AUTOMATED: CPT | Performed by: FAMILY MEDICINE

## 2021-09-23 PROCEDURE — 80053 COMPREHEN METABOLIC PANEL: CPT | Performed by: FAMILY MEDICINE

## 2021-09-23 PROCEDURE — 99495 TRANSJ CARE MGMT MOD F2F 14D: CPT | Performed by: FAMILY MEDICINE

## 2021-09-23 PROCEDURE — 36415 COLL VENOUS BLD VENIPUNCTURE: CPT | Performed by: FAMILY MEDICINE

## 2021-09-23 PROCEDURE — 81001 URINALYSIS AUTO W/SCOPE: CPT | Performed by: FAMILY MEDICINE

## 2021-09-23 RX ORDER — POLYETHYLENE GLYCOL 3350 17 G/17G
1 POWDER, FOR SOLUTION ORAL DAILY PRN
COMMUNITY
End: 2021-12-27

## 2021-09-23 ASSESSMENT — PATIENT HEALTH QUESTIONNAIRE - PHQ9
SUM OF ALL RESPONSES TO PHQ QUESTIONS 1-9: 3
10. IF YOU CHECKED OFF ANY PROBLEMS, HOW DIFFICULT HAVE THESE PROBLEMS MADE IT FOR YOU TO DO YOUR WORK, TAKE CARE OF THINGS AT HOME, OR GET ALONG WITH OTHER PEOPLE: NOT DIFFICULT AT ALL
SUM OF ALL RESPONSES TO PHQ QUESTIONS 1-9: 3

## 2021-09-23 ASSESSMENT — ANXIETY QUESTIONNAIRES
8. IF YOU CHECKED OFF ANY PROBLEMS, HOW DIFFICULT HAVE THESE MADE IT FOR YOU TO DO YOUR WORK, TAKE CARE OF THINGS AT HOME, OR GET ALONG WITH OTHER PEOPLE?: NOT DIFFICULT AT ALL
4. TROUBLE RELAXING: NOT AT ALL
7. FEELING AFRAID AS IF SOMETHING AWFUL MIGHT HAPPEN: NOT AT ALL
GAD7 TOTAL SCORE: 0
6. BECOMING EASILY ANNOYED OR IRRITABLE: NOT AT ALL
2. NOT BEING ABLE TO STOP OR CONTROL WORRYING: NOT AT ALL
1. FEELING NERVOUS, ANXIOUS, OR ON EDGE: NOT AT ALL
7. FEELING AFRAID AS IF SOMETHING AWFUL MIGHT HAPPEN: NOT AT ALL
5. BEING SO RESTLESS THAT IT IS HARD TO SIT STILL: NOT AT ALL
GAD7 TOTAL SCORE: 0
3. WORRYING TOO MUCH ABOUT DIFFERENT THINGS: NOT AT ALL
GAD7 TOTAL SCORE: 0

## 2021-09-23 ASSESSMENT — MIFFLIN-ST. JEOR: SCORE: 1063.43

## 2021-09-23 NOTE — LETTER
[facility]  [address], [place] [address] [phone]                    September 27, 2021    Denia Dailey  [address] [id number]      Dear Denia,    Here is a summary of your recent test results:    Labs are overall improving     Glucose is a little high   No bladder infection noted   Mildly low white count     We advise:     Continue current cares.   Balanced low cholesterol diet.   Regular exercise.     Recheck fasting in 2 months      Your test results are enclosed.      Please contact me if you have any questions.          Thank you very much for trusting [facility].     Healthy regards,          Pa Montana M.D.        Results for orders placed or performed in visit on 09/23/21   Comprehensive metabolic panel (BMP + Alb, Alk Phos, ALT, AST, Total. Bili, TP)     Status: Abnormal   Result Value Ref Range    Sodium 138 133 - 144 mmol/L    Potassium 4.1 3.4 - 5.3 mmol/L    Chloride 104 94 - 109 mmol/L    Carbon Dioxide (CO2) 28 20 - 32 mmol/L    Anion Gap 6 3 - 14 mmol/L    Urea Nitrogen 11 7 - 30 mg/dL    Creatinine 0.63 0.52 - 1.04 mg/dL    Calcium 8.4 (L) 8.5 - 10.1 mg/dL    Glucose 140 (H) 70 - 99 mg/dL    Alkaline Phosphatase 121 40 - 150 U/L    AST 25 0 - 45 U/L    ALT 60 (H) 0 - 50 U/L    Protein Total 6.9 6.8 - 8.8 g/dL    Albumin 3.4 3.4 - 5.0 g/dL    Bilirubin Total 0.4 0.2 - 1.3 mg/dL    GFR Estimate >90 >60 mL/min/1.73m2   CBC with platelets     Status: Abnormal   Result Value Ref Range    WBC Count 3.3 (L) 4.0 - 11.0 10e3/uL    RBC Count 4.29 3.80 - 5.20 10e6/uL    Hemoglobin 13.0 11.7 - 15.7 g/dL    Hematocrit 38.5 35.0 - 47.0 %    MCV 90 78 - 100 fL    MCH 30.3 26.5 - 33.0 pg    MCHC 33.8 31.5 - 36.5 g/dL    RDW 12.5 10.0 - 15.0 %    Platelet Count 268 150 - 450 10e3/uL   UA Macro with Reflex to Micro and Culture - lab collect     Status: Abnormal    Specimen: Urine, Midstream   Result Value Ref Range     Color Urine Yellow Colorless, Straw, Light Yellow, Yellow    Appearance Urine Clear Clear    Glucose Urine Negative Negative mg/dL    Bilirubin Urine Negative Negative    Ketones Urine Negative Negative mg/dL    Specific Gravity Urine >=1.030 1.003 - 1.035    Blood Urine Negative Negative    pH Urine 6.0 5.0 - 7.0    Protein Albumin Urine Negative Negative mg/dL    Urobilinogen Urine 0.2 0.2, 1.0 E.U./dL    Nitrite Urine Negative Negative    Leukocyte Esterase Urine Small (A) Negative   Urine Microscopic     Status: Abnormal   Result Value Ref Range    Bacteria Urine Moderate (A) None Seen /HPF    RBC Urine 0-2 0-2 /HPF /HPF    WBC Urine 0-5 0-5 /HPF /HPF    Squamous Epithelials Urine Few (A) None Seen /LPF    Narrative    Urine Culture not indicated   Urine Culture Aerobic Bacterial - lab collect     Status: None    Specimen: Urine, Midstream   Result Value Ref Range    Culture <10,000 CFU/mL Urogenital pretty

## 2021-09-23 NOTE — PROGRESS NOTES
Assessment & Plan       ICD-10-CM    1. Postoperative state  Z98.890 Comprehensive metabolic panel (BMP + Alb, Alk Phos, ALT, AST, Total. Bili, TP)     CBC with platelets     UA Macro with Reflex to Micro and Culture - lab collect     Urine Culture Aerobic Bacterial - lab collect     Comprehensive metabolic panel (BMP + Alb, Alk Phos, ALT, AST, Total. Bili, TP)     CBC with platelets     UA Macro with Reflex to Micro and Culture - lab collect     Urine Culture Aerobic Bacterial - lab collect     Urine Microscopic   2. Weight loss  R63.4 Comprehensive metabolic panel (BMP + Alb, Alk Phos, ALT, AST, Total. Bili, TP)     CBC with platelets     UA Macro with Reflex to Micro and Culture - lab collect     Urine Culture Aerobic Bacterial - lab collect     Comprehensive metabolic panel (BMP + Alb, Alk Phos, ALT, AST, Total. Bili, TP)     CBC with platelets     UA Macro with Reflex to Micro and Culture - lab collect     Urine Culture Aerobic Bacterial - lab collect     Urine Microscopic   3. Early satiety  R68.81 Comprehensive metabolic panel (BMP + Alb, Alk Phos, ALT, AST, Total. Bili, TP)     CBC with platelets     UA Macro with Reflex to Micro and Culture - lab collect     Urine Culture Aerobic Bacterial - lab collect     Comprehensive metabolic panel (BMP + Alb, Alk Phos, ALT, AST, Total. Bili, TP)     CBC with platelets     UA Macro with Reflex to Micro and Culture - lab collect     Urine Culture Aerobic Bacterial - lab collect     Urine Microscopic   4. Gastroesophageal reflux disease with esophagitis without hemorrhage  K21.00 Comprehensive metabolic panel (BMP + Alb, Alk Phos, ALT, AST, Total. Bili, TP)     CBC with platelets     UA Macro with Reflex to Micro and Culture - lab collect     Urine Culture Aerobic Bacterial - lab collect     Comprehensive metabolic panel (BMP + Alb, Alk Phos, ALT, AST, Total. Bili, TP)     CBC with platelets     UA Macro with Reflex to Micro and Culture - lab collect     Urine Culture  Aerobic Bacterial - lab collect     Urine Microscopic   5. Chronic cough  R05 Comprehensive metabolic panel (BMP + Alb, Alk Phos, ALT, AST, Total. Bili, TP)     CBC with platelets     UA Macro with Reflex to Micro and Culture - lab collect     Urine Culture Aerobic Bacterial - lab collect     Comprehensive metabolic panel (BMP + Alb, Alk Phos, ALT, AST, Total. Bili, TP)     CBC with platelets     UA Macro with Reflex to Micro and Culture - lab collect     Urine Culture Aerobic Bacterial - lab collect     Urine Microscopic   6. LPRD (laryngopharyngeal reflux disease)  K21.9 Comprehensive metabolic panel (BMP + Alb, Alk Phos, ALT, AST, Total. Bili, TP)     CBC with platelets     UA Macro with Reflex to Micro and Culture - lab collect     Urine Culture Aerobic Bacterial - lab collect     Comprehensive metabolic panel (BMP + Alb, Alk Phos, ALT, AST, Total. Bili, TP)     CBC with platelets     UA Macro with Reflex to Micro and Culture - lab collect     Urine Culture Aerobic Bacterial - lab collect     Urine Microscopic   7. Gastroparesis  K31.84 Comprehensive metabolic panel (BMP + Alb, Alk Phos, ALT, AST, Total. Bili, TP)     CBC with platelets     UA Macro with Reflex to Micro and Culture - lab collect     Urine Culture Aerobic Bacterial - lab collect     Comprehensive metabolic panel (BMP + Alb, Alk Phos, ALT, AST, Total. Bili, TP)     CBC with platelets     UA Macro with Reflex to Micro and Culture - lab collect     Urine Culture Aerobic Bacterial - lab collect     Urine Microscopic   8. Medication monitoring encounter  Z51.81 Comprehensive metabolic panel (BMP + Alb, Alk Phos, ALT, AST, Total. Bili, TP)     CBC with platelets     UA Macro with Reflex to Micro and Culture - lab collect     Urine Culture Aerobic Bacterial - lab collect     Comprehensive metabolic panel (BMP + Alb, Alk Phos, ALT, AST, Total. Bili, TP)     CBC with platelets     UA Macro with Reflex to Micro and Culture - lab collect     Urine Culture  Aerobic Bacterial - lab collect     Urine Microscopic       Discussed treatment/modality options, including risk and benefits, she desires:    1) labs    2) slow weight gain    3) diet with increased protein/fiber    4) weekly weight    All diagnosis above reviewed and noted above, otherwise stable.      See [facility] orders for further details.      Return in about 2 months (around 11/23/2021) for Medication Recheck Visit, Follow Up Acute, Follow Up Chronic.    No LOS data to display    Doing chart review, history and exam, documentation and further activities as noted.           Pa Montana MD, FAAFP     [facility]  [address] [place] [id number]  [email or web address]   Office: [phone]  Fax: [phone]  Pager: [phone]       Marilia Loza is a 66 year old who presents for the following health issues     HPI     Since discharge, no issues, active, walking, limited lifting, sleep good, bowels, constipation improved, bladder fine, appetite same, early satiety, less nausea, no vomiting, avoiding fats, finish augmentin tomorrow, has not started remeron yet, concerned about possible low white count, as has been borderline, overall feels like at 60% better    Hospital Follow-up Visit:    Hospital/Nursing Home/IP Rehab Facility: [facility]  Date of Admission: 09/15/21  Date of Discharge: 09/18/21  Reason(s) for Admission: cholecystectomy       Was your hospitalization related to COVID-19? No   Problems taking medications regularly:  None  Medication changes since discharge: None  Problems adhering to non-medication therapy:  None    Summary of hospitalization:  [facility] discharge summary reviewed  Diagnostic Tests/Treatments reviewed.  Follow up needed: GI/Cardiology  Other Healthcare Providers Involved in Patient s Care:         None  Update since discharge: improved.        Post Discharge Medication Reconciliation: discharge medications reconciled, continue medications without change.  Plan of care communicated with patient              [facility]  Discharge Summary        Denia Dailey MRN# [id number]   YOB: 1955 Age: 66 year old      Date of Admission:             9/15/2021  Date of Discharge:              9/18/2021         Discharge Diagnoses/Problem Oriented Hospital Course (Providers):    Denia Dailey was admitted on 9/15/2021 by Sadia De Guzman MD and I would refer you to their history and physical.  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     Chest pain most likely from biliary dyskinesia with symptomatic cholelithiasis and biliary colic contributing S/p lap cholecystectomy on 9/17/21  Elevated LFTs  Febrile illness  Patient presents with sudden onset chest pain described a ache with associated diaphoresis.  D-dimer was mildly elevated but chest CT was negative for pulmonary embolus or other causes of chest pain.  Troponin x3 and EKG negative.  LFT on 9/16 show elevated transaminases in 200s (recently normal 5/2021). Bili 1.2.  CT also noted gallbladder stones vs sludge. Denies significant alcohol or tylenol use. Abdominal exam is benign.   She has not had recurrence of her pain since admission.   -Stress echo 9/16 with negative EKG portion, unable to fully assess for presence/absence of WMA  -HIDA scan was done on 9/16/2021 due to elevated LFTs and her symptoms of chest pain and diaphoresis and ongoing nausea and inability to eat.   HIDA scan returned the abnormal with suggesting biliary dyskinesia  General  surgery consulted and laparoscopic cholecystectomy done on 9/17/21  She had a fever of 101  F overnight on 9/16/21 blood cultures has been sent.  Chest CT was negative for pneumonia on admission.   Blood cultures have been obtained overnight are pending  Started Zosyn empirically .  We will plan on 7-day course of oral augmentib antibiotics on discharge     Hematuria   UA showed moderate blood otherwise negative. Repeat UA in 1week if still blood present in urine needs out patient follow up with Urology      GERD.  Poor appetite and weight loss   -Continue daily omeprazole, reports current symptoms not consistent with GERD sxs   Pt wants trial a course of Remeron to see if it will improve her appetite      Moderate mitral regurgitation.  Seen on recent echocardiogram.  -Follow-up with cardiology as arranged     Unintentional weight loss.  Has lost 40 pounds with early satiety.  Gastric emptying study showed decreased motility but her to doctors disagree with how much this is impacting her weight loss.  EGD showed some flattening of the stomach folds but was otherwise normal.  Biopsies were taken.  Does have a history of H. pylori and developed C. difficile a few months after treatment.  -Await biopsy results and follow-up with GI     Chronic cough.  -Continue inhaler as outpatient  -Continue nighttime Robitussin cough medication as needed nightly     Hypocalcemia.  Admission calcium level was 7.9.  She does take calcium and vitamin D supplements. Improved on recheck with normal ionized calcium. Continue supplements at discharge.      DVT Prophylaxis: Pneumatic Compression Devices  Code Status: Full Code         Code Status:      Full Code         Brief Hospital Stay Summary Sent Home With Patient in AVS:        Reason for your hospital stay      You were here for evaluation of chest pain            Important Results:      See below          Pending Results:                Unresulted Labs Ordered in the Past 30 Days  of this Admission      Date and Time Order Name Status Description     9/17/2021  1:48 PM Surgical Pathology Exam In process       9/16/2021  7:28 PM Blood Culture Hand, Left Preliminary       9/16/2021  7:28 PM Blood Culture Arm, Right Preliminary                Discharge Instructions and Follow-Up:      Follow-up Appointments     Follow-up and recommended labs and tests       Follow up with primary care provider within 5 days for hospital follow   up. Recheck liver function tests. Revisit conversation regarding CT   coronary angiogram if recurrence of symptoms.      Continue routine follow up with GI            Discharge Medications:              Current Discharge Medication List            START taking these medications     Details   amoxicillin-clavulanate (AUGMENTIN) 875-125 MG tablet Take 1 tablet by mouth 2 times daily for 6 days  Qty: 12 tablet, Refills: 0     Associated Diagnoses: Febrile illness       mirtazapine (REMERON) 7.5 MG tablet Take 2 tablets (15 mg) by mouth At Bedtime Take 7.5mg PO daily for 3days then increase to 15mg PO daily  Qty: 30 tablet, Refills: 0     Associated Diagnoses: Biliary dyskinesia                CONTINUE these medications which have NOT CHANGED     Details   Ascorbic Acid 500 MG CHEW Take 500 mg by mouth daily       CALCIUM + D 600-200 MG-UNIT OR TABS Take 1 tablet by mouth 2 times daily        cetirizine (ZYRTEC) 10 MG tablet Take 10 mg by mouth daily        Cholecalciferol (VITAMIN D) 2000 UNITS tablet Take 2,000 Units by mouth daily  Qty: 100 tablet, Refills: 3       Doxylamine-DM 12.5-30 MG/10ML LIQD Take 10 mLs by mouth nightly as needed       folic acid 0.8 MG CAPS         magnesium 250 MG tablet Take 1 tablet by mouth daily.  Qty: 100 tablet, Refills: 3       omeprazole (PRILOSEC) 40 MG DR capsule Take 1 capsule (40 mg) by mouth daily  Qty: 180 capsule, Refills: 3     Associated Diagnoses: Weight loss; Early satiety; Gastroesophageal reflux disease with esophagitis  without hemorrhage; Gastroparesis       BREO ELLIPTA 200-25 MCG/INH Inhaler INHALE ONE PUFF BY MOUTH ONCE DAILY  Qty: 60 each, Refills: 9     Associated Diagnoses: Chronic cough                  Image Results From This Hospital Stay (For Non-EPIC Providers):             Results for orders placed or performed during the hospital encounter of 09/15/21   CT Chest Pulmonary Embolism w Contrast     Narrative     CT CHEST PULMONARY EMBOLISM WITH CONTRAST  9/15/2021 11:13 AM     CLINICAL HISTORY: Dyspnea. Positive D-dimer.     TECHNIQUE: CT angiogram chest during arterial phase injection IV  contrast. 2D and 3D MIP reconstructions were performed by the CT  technologist. Dose reduction techniques were used.      CONTRAST: 56 mL Isovue-370     COMPARISON: None.     FINDINGS:  ANGIOGRAM CHEST: Pulmonary arteries are normal caliber and negative  for pulmonary emboli. Thoracic aorta is negative for dissection. No CT  evidence of right heart strain.     LUNGS AND PLEURA: Calcified granulomas noted in the lower lobes and  right frontal lobe. Mild dependent atelectasis at the lung bases. No  pleural fluid.     MEDIASTINUM/AXILLAE: Heart is normal in size. No pericardial fluid.  Calcified mediastinal and left hilar lymph nodes are present  consistent with old granulomatous disease. No enlarged lymph nodes are  appreciated. Thyroid gland appears normal in size. Esophagus is  unremarkable.     UPPER ABDOMEN: Gallstones or gallbladder sludge noted. No gallbladder  inflammation is appreciated.     MUSCULOSKELETAL: Degenerative spine changes. No destructive bone  lesions.        Impression     IMPRESSION:  1.  No pulmonary embolism. Thoracic aorta is unremarkable.     2.  Evidence of old granulomatous disease.     3.  Gallbladder stones or sludge. Follow-up gallbladder ultrasound if  clinically warranted for further assessment.     MD WESLEY ORELLANA Hepatobiliary Scan w GB EF     Narrative     EXAM: NM HEPATOBILIARY SCAN WITH GB    LOCATION: [facility]  DATE/TIME: 9/16/2021 1:57 PM     INDICATION: Right upper quadrant abdominal pain  COMPARISON: CT the chest and pelvis dated 05/20/2021  TECHNIQUE: 6.2 mCi of technetium-99m mebrofenin, IV. Anterior planar imaging of the abdomen. 1.0 mcg of cholecystokinin analog, IV. Gallbladder imaging for 30-60 minutes.     FINDINGS: Normal radionuclide activity in liver, gallbladder, bile ducts and small bowel. No evidence of cystic or common duct obstruction or intrinsic liver disease. Gallbladder ejection fraction measures 13%, which is below the normal range of 35% or   greater.        Impression     IMPRESSION:      Findings suspicious for biliary dyskinesia.     Echo Stress Echocardiogram     Narrative     ___________________________________________________________________________  Interpretation Summary  The patient exhibited no chest pain during exercise.  The Duke treadmill score was low risk ( >5 Duke score).  Suboptimal image quality precludes assessment of wall motion abnormalities  with stress.  In the stress images there was no true apical four chamber view (the aortic  valve is seen throughout the series of images). Therefore, the anterolateral  wall is not seen in the apical acoustic window on this study. The basal  anterior wall endocardium is not seen in the apical two chamber view. The  parasternal short axis images were captured outside of the 60 seconds into  recovery which will reduce accuracy.  This was a normal stress EKG with no evidence of stress-induced ischemia.  _____________________________________________________________________________               Most Recent Lab Results In EPIC (For Non-EPIC Providers):    Most Recent 3 CBC's:        Recent Labs   Lab Test 09/18/21  0611 09/16/21  2027 09/15/21  1017   WBC 6.9 2.2* 4.7   HGB 11.4* 12.3 12.5   MCV 88 91 91    198 192      Most Recent 3 BMP's:        Recent Labs   Lab Test 09/18/21  0611  09/17/21  0626 09/16/21 2027    141 141   POTASSIUM 4.1 4.2 3.8   CHLORIDE 106 111* 110*   CO2 29 26 26   BUN 12 13 14   CR 0.71 0.66 0.85   ANIONGAP 2* 4 5   KEYA 8.3* 8.5 8.6   * 92 110*      Most Recent 3 Troponin's:        Recent Labs   Lab Test 09/15/21  2024 09/15/21  1656 09/15/21  1017   TROPONIN <0.015 <0.015 <0.015      Most Recent 3 INR's:      Recent Labs   Lab Test 06/24/16  0737   INR 0.97      Most Recent 2 LFT's:       Recent Labs   Lab Test 09/18/21  0611 09/17/21  0626   AST 51* 76*   * 170*   ALKPHOS 107 133   BILITOTAL 0.8 0.7      Most Recent Cholesterol Panel:      Recent Labs   Lab Test 05/24/21  0937   CHOL 202*   *   HDL 60   TRIG 127      Most Recent 6 Bacteria Isolates From Any Culture (See EPIC Reports for Culture Details):         Recent Labs   Lab Test 09/19/18  1519 06/24/16  0920 06/24/16  0917 08/20/14  0953   CULT No beta hemolytic Streptococcus Group A isolated Culture negative for acid fast bacilli  Assayed at [facility] [address]    Culture negative after 4 weeks  Light growth Normal respiratory pretty Culture negative for acid fast bacilli  Assayed at [facility] [address]    No growth after 4 weeks  Culture negative after 4 weeks  Light growth Normal respiratory pretty <10,000 colonies/mL mixed urogenital pretty  Susceptibility testing not routinely done         Most Recent TSH, T4 and HgbA1c:       Recent Labs   Lab Test 05/24/21  0937   TSH 0.68        9/14/2021 EGD    Stomach, random, biopsy:  -Chronic active gastritis.  -Intestinal metaplasia.    8/30/2021    She eats 2-3 servings of fruits and vegetables daily.She consumes 1 sweetened beverage(s) daily.She exercises with enough effort to increase her heart rate 30 to 60 minutes per day.  She exercises with enough effort to increase her heart rate 4 days per week. She is missing 3 dose(s) of medications per week.  She is not taking prescribed  medications regularly due to remembering to take.     Answers for HPI/ROS submitted by the patient on 8/30/2021  If you checked off any problems, how difficult have these problems made it for you to do your work, take care of things at home, or get along with other people?: Not difficult at all  PHQ9 TOTAL SCORE: 0  SHANI 7 TOTAL SCORE: 0     Gets full easily, appetite decreased, 1/2 portions, weight is declining, max weight was 160, losing about 10 lbs per year - no burping, no belching, no heartburn, no change with increased omeprazole, noticing clothes feel big, seen GI (Dr Ramirez/Soys), H Pylori in 2016, C Diff in 2018, salmonella afterwards, CT/Video swallow in 2018 negative, CT colonography negative except diverticulosis, delayed gastric emptying time - minimal, celiac negative - works 12 hrs shift at [employer] - nutrition consult was negative/unrevealing         Wt Readings from Last 4 Encounters:   08/30/21 52.1 kg (114 lb 12.8 oz)   05/24/21 54 kg (119 lb)   07/20/19 60.3 kg (133 lb)   09/19/18 62.1 kg (137 lb)      Work up to date     CT Abd/Chest/Pelvis     IMPRESSION:  1.  Evidence of old granulomatous disease. Lungs are otherwise clear.  No evidence of a mass in the abdomen or pelvis.     2.  Benign left renal cortical cyst. No specific follow-up suggested.  Tiny nonobstructing collecting system stone noted within each kidney.  No hydronephrosis.     3.  Mild constipation. No bowel obstruction, diverticulitis or  appendicitis.     CT neck soft tissue - ENT consult was negative     IMPRESSION: Negative CT scan of the neck. No evidence for  lymphadenopathy or any focal neck masses or abnormalities at the area  of clinical concern.     Mammogram - Dr Kirk      Review of Systems   CONSTITUTIONAL: NEGATIVE for fever, chills, change in weight  INTEGUMENTARY/SKIN: NEGATIVE for worrisome rashes, moles or lesions  EYES: NEGATIVE for vision changes or irritation  ENT/MOUTH: NEGATIVE for ear, mouth and throat  "problems  RESP: NEGATIVE for significant cough or SOB  CV: NEGATIVE for chest pain, palpitations or peripheral edema  GI: NEGATIVE for nausea, abdominal pain, heartburn, or change in bowel habits  : NEGATIVE for frequency, dysuria, or hematuria  MUSCULOSKELETAL: NEGATIVE for significant arthralgias or myalgia  NEURO: NEGATIVE for weakness, dizziness or paresthesias  ENDOCRINE: NEGATIVE for temperature intolerance, skin/hair changes  HEME: NEGATIVE for bleeding problems  PSYCHIATRIC: NEGATIVE for changes in mood or affect      Objective    /62   Pulse 95   Temp 97.1  F (36.2  C) (Tympanic)   Resp 16   Ht 1.651 m (5' 5\")   Wt 51.8 kg (114 lb 1.6 oz)   LMP 08/08/2009   SpO2 100%   BMI 18.99 kg/m    Body mass index is 18.99 kg/m .  Physical Exam   GENERAL: healthy, alert and no distress  EYES: Eyes grossly normal to inspection, PERRL and conjunctivae and sclerae normal  HENT: ear canals and TM's normal, nose and mouth without ulcers or lesions  NECK: no adenopathy, no asymmetry, masses, or scars and thyroid normal to palpation  RESP: lungs clear to auscultation - no rales, rhonchi or wheezes  BREAST: normal without masses, tenderness or nipple discharge and no palpable axillary masses or adenopathy  CV: regular rate and rhythm, normal S1 S2, no S3 or S4, no murmur, click or rub, no peripheral edema and peripheral pulses strong  ABDOMEN: soft, nontender, no hepatosplenomegaly, no masses and bowel sounds normal  MS: no gross musculoskeletal defects noted, no edema  SKIN: no suspicious lesions or rashes  NEURO: Normal strength and tone, mentation intact and speech normal  PSYCH: mentation appears normal, affect normal/bright    Reviewed labs/imaging    Answers for HPI/ROS submitted by the patient on 9/23/2021  If you checked off any problems, how difficult have these problems made it for you to do your work, take care of things at home, or get along with other people?: Not difficult at all  PHQ9 TOTAL " SCORE: 3  SHANI 7 TOTAL SCORE: 0

## 2021-09-24 LAB — BACTERIA UR CULT: NORMAL

## 2021-09-24 ASSESSMENT — PATIENT HEALTH QUESTIONNAIRE - PHQ9: SUM OF ALL RESPONSES TO PHQ QUESTIONS 1-9: 3

## 2021-09-24 ASSESSMENT — ANXIETY QUESTIONNAIRES: GAD7 TOTAL SCORE: 0

## 2021-09-25 ENCOUNTER — MYC MEDICAL ADVICE (OUTPATIENT)
Dept: FAMILY MEDICINE | Facility: CLINIC | Age: 66
End: 2021-09-25

## 2021-09-28 NOTE — TELEPHONE ENCOUNTER
Please review and advise on my chart message below.      Thank you     Faye HAINES RN, BSN  [facility]

## 2021-09-29 ENCOUNTER — MYC MEDICAL ADVICE (OUTPATIENT)
Dept: FAMILY MEDICINE | Facility: CLINIC | Age: 66
End: 2021-09-29

## 2021-09-29 DIAGNOSIS — D70.2 OTHER DRUG-INDUCED NEUTROPENIA (H): Primary | ICD-10-CM

## 2021-09-30 ENCOUNTER — DOCUMENTATION ONLY (OUTPATIENT)
Dept: ONCOLOGY | Facility: CLINIC | Age: 66
End: 2021-09-30

## 2021-09-30 ENCOUNTER — MYC MEDICAL ADVICE (OUTPATIENT)
Dept: FAMILY MEDICINE | Facility: CLINIC | Age: 66
End: 2021-09-30

## 2021-09-30 DIAGNOSIS — D70.2 DRUG-INDUCED NEUTROPENIA (H): Primary | ICD-10-CM

## 2021-09-30 NOTE — TELEPHONE ENCOUNTER
Please review and advise on my chart message below.        Thank you     Faye HAINES RN, BSN  [facility]

## 2021-09-30 NOTE — TELEPHONE ENCOUNTER
mirtazapine (REMERON) 7.5 MG tablet 30 tablet 0 9/18/2021  --   Sig - Route: Take 2 tablets (15 mg) by mouth At Bedtime Take 7.5mg PO daily for 3days then increase to 15mg PO daily - Oral   Sent to pharmacy as: Mirtazapine 7.5 MG Oral Tablet (REMERON)     Patient on Remeron and WBC is decreasing.      [employer] message sent.     Order: Oncology/Hematology Adult Referral    Tramaine Tse MD   [address] [address]   Phone: [phone]   Fax: [phone]            Faye HAINES RN, BSN  -[facility]

## 2021-09-30 NOTE — PROGRESS NOTES
Writer received referral for Remeron induced neutropenia. Reviewed for urgency based on labs and symptomology. Appropriate scheduling instructions added and referral sent to New Patient Scheduling for completion.

## 2021-10-01 NOTE — TELEPHONE ENCOUNTER
See other mychart message.    Tristin ABREU RN   [facility]

## 2021-10-04 ENCOUNTER — OFFICE VISIT (OUTPATIENT)
Dept: CARDIOLOGY | Facility: CLINIC | Age: 66
End: 2021-10-04
Attending: PHYSICIAN ASSISTANT
Payer: COMMERCIAL

## 2021-10-04 ENCOUNTER — TELEPHONE (OUTPATIENT)
Dept: SURGERY | Facility: CLINIC | Age: 66
End: 2021-10-04

## 2021-10-04 VITALS
HEIGHT: 65 IN | WEIGHT: 115.9 LBS | HEART RATE: 76 BPM | SYSTOLIC BLOOD PRESSURE: 116 MMHG | BODY MASS INDEX: 19.31 KG/M2 | DIASTOLIC BLOOD PRESSURE: 74 MMHG

## 2021-10-04 DIAGNOSIS — E78.2 MIXED HYPERLIPIDEMIA: Primary | ICD-10-CM

## 2021-10-04 DIAGNOSIS — I34.0 NONRHEUMATIC MITRAL VALVE REGURGITATION: ICD-10-CM

## 2021-10-04 PROCEDURE — 99204 OFFICE O/P NEW MOD 45 MIN: CPT | Performed by: INTERNAL MEDICINE

## 2021-10-04 ASSESSMENT — MIFFLIN-ST. JEOR: SCORE: 1071.6

## 2021-10-04 NOTE — LETTER
10/4/2021    Pa Montana MD  [address] [facility] [address]    RE: Denia Dailey       Dear Colleague,    I had the pleasure of seeing Denia Dailey in the [facility].    CARDIOLOGY CLINIC CONSULTATION      REASON FOR CONSULT:   Mitral valve prolapse, mitral valve regurgitation    PRIMARY CARE PHYSICIAN:  Pa Montana        History of Present Illness   Denia Dailey is an extremely pleasant 66 year old female here as a new patient for evaluation of her mitral valve prolapse and regurgitation.  She has a past medical history significant only for chronic gastritis/colitis, gastroparesis, GERD, and recently admission for chest pain which was attributed to biliary dyskinesia with symptomatic cholelithiasis s/p laparoscopic cholecystectomy on 9/17/2021.  She is referred to cardiology clinic due to discovery of mitral valve prolapse and mitral regurgitation on a transthoracic echocardiogram from 8/10/2021.  I reviewed the images of the echo, and agree that there is normal LV systolic function, normal RV systolic function, and borderline mitral valve prolapse more pronounced on the posterior leaflet, with moderate versus moderate-severe (2-3+) central mitral regurgitation.  Her family history is significant for both grandmothers having pacemakers placed late in life.  Her mother also has mitral valve prolapse.  She smoked briefly in her 20s, but has not smoked since.  She rarely drinks alcohol.    From a symptomatic standpoint, she has been doing very well with no issues.  She exercises by walking 3 miles per day.  Other than immediately prior to her laparoscopic cholecystectomy, she has no chest pain, and also has no shortness of breath issues, lower extremity swelling, orthopnea, lightheadedness, syncope, or palpitations.    Her most recent lipid panel is from 5/24/2021, and shows a total cholesterol of 202, HDL of 60, LDL of 117, and triglycerides  of 127.  Her most recent full echocardiogram is from 8/10/2021 and is described above.  Her most recent ischemic evaluation is from 9/16/2021 which was an exercise stress echo.  Image quality was suboptimal, but she did exercised for a total of 9 minutes on a Yonis protocol, achieving a total of 10 METS and 97% of her maximal predicted heart rate.  EKG was unremarkable other than a 3 beat run of NSVT and occasional PVCs, and other than the poor image quality, there are no obvious echocardiographic findings suggestive of ischemia.  She also had a CT PE protocol on 9/15/2021 which showed no evidence of PE.  I reviewed the images and did not see any obvious calcification of her left coronary system.  The RCA system was not well seen.      Assessment & Plan     1. Asymptomatic moderate/moderate-severe (2-3+) central mitral regurgitation  2. Borderline mitral valve prolapse  3. Mild dyslipidemia, with ASCVD 10-year risk of less than 7.5%  4. Chronic gastritis/colitis, gastroparesis, symptomatic cholelithiasis s/p lap fátima on 9/17/2021      It was a pleasure to meet with Denia in clinic today.  We discussed the implications of her mitral valve prolapse and regurgitation.  At this point, as she is entirely asymptomatic and does not meet criteria for severe mitral regurgitation, I think that the appropriate management here is for close follow-up without intervention at this point.  I would recommend a repeat echocardiogram in 6 months, or sooner if she develops new symptoms.  As her blood pressure is very well controlled, there is no indication for further medical intervention at this time.  Her ASCVD risk score is less than 7.5%, and therefore I would not recommend statin therapy at this time but will recommend continued excellent diet and exercise lifestyle interventions.      -Repeat TTE in 6 months  -Continue excellent diet and exercise regimen      Follow-up: 6 months, with TTE beforehand      Alex Dixon,  MD  Interventional Cardiology  October 4, 2021        Medications   Current Outpatient Medications   Medication     Ascorbic Acid 500 MG CHEW     CALCIUM + D 600-200 MG-UNIT OR TABS     Doxylamine-DM 12.5-30 MG/10ML LIQD     folic acid 0.8 MG CAPS     magnesium 250 MG tablet     omeprazole (PRILOSEC) 40 MG DR capsule     polyethylene glycol (MIRALAX) 17 GM/Dose powder     Probiotic Product (PROBIOTIC PO)     BREO ELLIPTA 200-25 MCG/INH Inhaler     mirtazapine (REMERON) 7.5 MG tablet     No current facility-administered medications for this visit.     Allergies   Allergies   Allergen Reactions     Vioxx      stomach upset     Nickel Rash     Accompanied by itching and swelling         Physical Exam       BP: 116/74 Pulse: 76            Vital Signs with Ranges  Pulse:  [76] 76  BP: (116)/(74) 116/74  115 lbs 14.4 oz    Constitutional: Well-appearing, no acute distress  Respiratory: Normal respiratory effort, CTAB  Cardiovascular: RRR, midsystolic click, 2/6 holosystolic murmur at the apex.  JVP < 7 cm H2O.  There is no LE edema.  Normal carotid upstrokes, no carotid bruits.    Thank you for allowing me to participate in the care of your patient.    Sincerely,     Alex Dixon MD     [facility]  cc:   Amisha Giraldo PA-C  [address] [place] [id number]

## 2021-10-04 NOTE — TELEPHONE ENCOUNTER
Please see my chart message below     Please review and advise     Thank you     Cristy Zepeda RN, BSN  [place] [facility]

## 2021-10-04 NOTE — TELEPHONE ENCOUNTER
[facility]  Post op call note - No Answer    Denia Dailey was called for an update regarding her recovery.  There was no answer and a message was left instructing the patient to call the office with any questions or concerns.     Marci Camara PA-C

## 2021-10-04 NOTE — PROGRESS NOTES
CARDIOLOGY CLINIC CONSULTATION      REASON FOR CONSULT:   Mitral valve prolapse, mitral valve regurgitation    PRIMARY CARE PHYSICIAN:  Pa Montana        History of Present Illness   Denia Dailey is an extremely pleasant 66 year old female here as a new patient for evaluation of her mitral valve prolapse and regurgitation.  She has a past medical history significant only for chronic gastritis/colitis, gastroparesis, GERD, and recently admission for chest pain which was attributed to biliary dyskinesia with symptomatic cholelithiasis s/p laparoscopic cholecystectomy on 9/17/2021.  She is referred to cardiology clinic due to discovery of mitral valve prolapse and mitral regurgitation on a transthoracic echocardiogram from 8/10/2021.  I reviewed the images of the echo, and agree that there is normal LV systolic function, normal RV systolic function, and borderline mitral valve prolapse more pronounced on the posterior leaflet, with moderate versus moderate-severe (2-3+) central mitral regurgitation.  Her family history is significant for both grandmothers having pacemakers placed late in life.  Her mother also has mitral valve prolapse.  She smoked briefly in her 20s, but has not smoked since.  She rarely drinks alcohol.    From a symptomatic standpoint, she has been doing very well with no issues.  She exercises by walking 3 miles per day.  Other than immediately prior to her laparoscopic cholecystectomy, she has no chest pain, and also has no shortness of breath issues, lower extremity swelling, orthopnea, lightheadedness, syncope, or palpitations.    Her most recent lipid panel is from 5/24/2021, and shows a total cholesterol of 202, HDL of 60, LDL of 117, and triglycerides of 127.  Her most recent full echocardiogram is from 8/10/2021 and is described above.  Her most recent ischemic evaluation is from 9/16/2021 which was an exercise stress echo.  Image quality was suboptimal, but she did exercised for a total  of 9 minutes on a Yonis protocol, achieving a total of 10 METS and 97% of her maximal predicted heart rate.  EKG was unremarkable other than a 3 beat run of NSVT and occasional PVCs, and other than the poor image quality, there are no obvious echocardiographic findings suggestive of ischemia.  She also had a CT PE protocol on 9/15/2021 which showed no evidence of PE.  I reviewed the images and did not see any obvious calcification of her left coronary system.  The RCA system was not well seen.      Assessment & Plan     1. Asymptomatic moderate/moderate-severe (2-3+) central mitral regurgitation  2. Borderline mitral valve prolapse  3. Mild dyslipidemia, with ASCVD 10-year risk of less than 7.5%  4. Chronic gastritis/colitis, gastroparesis, symptomatic cholelithiasis s/p lap fátima on 9/17/2021      It was a pleasure to meet with Denia in clinic today.  We discussed the implications of her mitral valve prolapse and regurgitation.  At this point, as she is entirely asymptomatic and does not meet criteria for severe mitral regurgitation, I think that the appropriate management here is for close follow-up without intervention at this point.  I would recommend a repeat echocardiogram in 6 months, or sooner if she develops new symptoms.  As her blood pressure is very well controlled, there is no indication for further medical intervention at this time.  Her ASCVD risk score is less than 7.5%, and therefore I would not recommend statin therapy at this time but will recommend continued excellent diet and exercise lifestyle interventions.      -Repeat TTE in 6 months  -Continue excellent diet and exercise regimen      Follow-up: 6 months, with TTE beforehand      Alex Dixon MD  Interventional Cardiology  October 4, 2021        Medications   Current Outpatient Medications   Medication     Ascorbic Acid 500 MG CHEW     CALCIUM + D 600-200 MG-UNIT OR TABS     Doxylamine-DM 12.5-30 MG/10ML LIQD     folic acid 0.8 MG CAPS      magnesium 250 MG tablet     omeprazole (PRILOSEC) 40 MG DR capsule     polyethylene glycol (MIRALAX) 17 GM/Dose powder     Probiotic Product (PROBIOTIC PO)     BREO ELLIPTA 200-25 MCG/INH Inhaler     mirtazapine (REMERON) 7.5 MG tablet     No current facility-administered medications for this visit.     Allergies   Allergies   Allergen Reactions     Vioxx      stomach upset     Nickel Rash     Accompanied by itching and swelling         Physical Exam       BP: 116/74 Pulse: 76            Vital Signs with Ranges  Pulse:  [76] 76  BP: (116)/(74) 116/74  115 lbs 14.4 oz    Constitutional: Well-appearing, no acute distress  Respiratory: Normal respiratory effort, CTAB  Cardiovascular: RRR, midsystolic click, 2/6 holosystolic murmur at the apex.  JVP < 7 cm H2O.  There is no LE edema.  Normal carotid upstrokes, no carotid bruits.

## 2021-10-05 ENCOUNTER — TELEPHONE (OUTPATIENT)
Dept: FAMILY MEDICINE | Facility: CLINIC | Age: 66
End: 2021-10-05

## 2021-10-05 ENCOUNTER — DOCUMENTATION ONLY (OUTPATIENT)
Dept: ONCOLOGY | Facility: CLINIC | Age: 66
End: 2021-10-05

## 2021-10-05 NOTE — PROGRESS NOTES
Writer received referral for medication induced neutropenia, duplicate referral and patient is already set up for next available at preferred location. Message sent to referring provider and referral canceled.

## 2021-10-05 NOTE — TELEPHONE ENCOUNTER
My chart message sent     Cristy Zepeda RN, BSN  [facility]

## 2021-10-05 NOTE — TELEPHONE ENCOUNTER
Please help get scheduled at  or [facility] with first available, see prior notes

## 2021-10-05 NOTE — TELEPHONE ENCOUNTER
----- Message from Senia Arce RN sent at 10/5/2021  3:53 PM CDT -----  Regarding: Duplicate referral  Good afternoon Dr Montana-    Just wanted to loop you in on why this referral has been canceled. Denia is set up to see Dr Tse in January, which was next available for her location preference. If needing to be seen sooner, would recommend calling the provider to provider line to discuss the urgency at [phone] or she can call our scheduling team to look at other locations that may have sooner openings.    Just didn't want to cancel the referral without saying something.    Thank you-    Senia Arce, RN, BSN  Hematology/Oncology Nurse Navigator   [facility]   [phone]

## 2021-10-07 LAB
ALBUMIN SERPL-MCNC: 3.4 G/DL (ref 3.4–5)
ALP SERPL-CCNC: 121 U/L (ref 40–150)
ALT SERPL W P-5'-P-CCNC: 60 U/L (ref 0–50)
ANION GAP SERPL CALCULATED.3IONS-SCNC: 6 MMOL/L (ref 3–14)
AST SERPL W P-5'-P-CCNC: 25 U/L (ref 0–45)
BILIRUB SERPL-MCNC: 0.4 MG/DL (ref 0.2–1.3)
BUN SERPL-MCNC: 11 MG/DL (ref 7–30)
CALCIUM SERPL-MCNC: 9.4 MG/DL (ref 8.5–10.1)
CHLORIDE BLD-SCNC: 104 MMOL/L (ref 94–109)
CO2 SERPL-SCNC: 28 MMOL/L (ref 20–32)
CREAT SERPL-MCNC: 0.63 MG/DL (ref 0.52–1.04)
GFR SERPL CREATININE-BSD FRML MDRD: >90 ML/MIN/1.73M2
GLUCOSE BLD-MCNC: 140 MG/DL (ref 70–99)
POTASSIUM BLD-SCNC: 4.1 MMOL/L (ref 3.4–5.3)
PROT SERPL-MCNC: 6.9 G/DL (ref 6.8–8.8)
SODIUM SERPL-SCNC: 138 MMOL/L (ref 133–144)

## 2021-10-07 NOTE — TELEPHONE ENCOUNTER
Attempt # 1  Called # [phone]     Calling to advise [facility], call [phone]  Dr. Willis or Dr. Paul  See mychart encounter on 9/30/21      Left a non detailed VM to call back at [phone] and ask for any available Triage Nurse.    Tristin Mceke RN   [facility]

## 2021-10-12 ENCOUNTER — TRANSFERRED RECORDS (OUTPATIENT)
Dept: HEALTH INFORMATION MANAGEMENT | Facility: CLINIC | Age: 66
End: 2021-10-12

## 2021-10-20 NOTE — PROGRESS NOTES
RECORDS STATUS - ALL OTHER DIAGNOSIS      RECORDS RECEIVED FROM: [facility]   DATE RECEIVED: 1/14/2021   NOTES STATUS DETAILS   OFFICE NOTE from referring provider Complete [facility] Pa Montana MD   OFFICE NOTE from medical oncologist N/A    DISCHARGE SUMMARY from hospital Complete    DISCHARGE REPORT from the ER     OPERATIVE REPORT Complete Upper GI Endoscopy 9/14/2021   MEDICATION LIST Complete [facility]   CLINICAL TRIAL TREATMENTS TO DATE     LABS     PATHOLOGY REPORTS     ANYTHING RELATED TO DIAGNOSIS Complete Labs last updated on 9/23/2021   GENONOMIC TESTING     TYPE:     IMAGING (NEED IMAGES & REPORT)     CT SCANS Complete CT Chest 9/15/2021    CT Soft Tissue Neck 5/28/2021    CT Chest Abdomen Pelvis 5/28/2021   MRI     MAMMO     ULTRASOUND     PET

## 2021-10-24 ENCOUNTER — HEALTH MAINTENANCE LETTER (OUTPATIENT)
Age: 66
End: 2021-10-24

## 2021-10-28 DIAGNOSIS — K82.8 BILIARY DYSKINESIA: ICD-10-CM

## 2021-10-28 RX ORDER — MIRTAZAPINE 7.5 MG/1
15 TABLET, FILM COATED ORAL AT BEDTIME
Qty: 90 TABLET | Refills: 0 | Status: SHIPPED | OUTPATIENT
Start: 2021-10-28 | End: 2021-12-21

## 2021-10-28 NOTE — TELEPHONE ENCOUNTER
Reason for Call:  Medication or medication refill:    Do you use a [facility]?  Name of the pharmacy and phone number for the current request:  [facility] - [phone]    Name of the medication requested: mirtazapine (REMERON) 7.5 MG tablet    Other request: Patient stopped by the  today saying she has no more of her mirtazapine and is requesting a refill. She said she only has one left for tonight. She would like a call back ASAP.    Can we leave a detailed message on this number? YES    Phone number patient can be reached at: Home number on file [phone] (home)    Best Time: Anytime    Call taken on 10/28/2021 at 1:53 PM by Sandra Long

## 2021-12-17 ENCOUNTER — MYC MEDICAL ADVICE (OUTPATIENT)
Dept: INTERNAL MEDICINE | Facility: CLINIC | Age: 66
End: 2021-12-17
Payer: COMMERCIAL

## 2021-12-21 NOTE — TELEPHONE ENCOUNTER
See refill request     Cristy Zepeda RN, BSN  [facility]

## 2021-12-27 ENCOUNTER — OFFICE VISIT (OUTPATIENT)
Dept: FAMILY MEDICINE | Facility: CLINIC | Age: 66
End: 2021-12-27
Payer: COMMERCIAL

## 2021-12-27 VITALS
WEIGHT: 120.2 LBS | HEART RATE: 80 BPM | OXYGEN SATURATION: 100 % | SYSTOLIC BLOOD PRESSURE: 124 MMHG | TEMPERATURE: 97.9 F | BODY MASS INDEX: 20.03 KG/M2 | DIASTOLIC BLOOD PRESSURE: 83 MMHG | HEIGHT: 65 IN

## 2021-12-27 DIAGNOSIS — K21.9 LPRD (LARYNGOPHARYNGEAL REFLUX DISEASE): ICD-10-CM

## 2021-12-27 DIAGNOSIS — R05.3 CHRONIC COUGH: ICD-10-CM

## 2021-12-27 DIAGNOSIS — Z51.81 MEDICATION MONITORING ENCOUNTER: ICD-10-CM

## 2021-12-27 DIAGNOSIS — K21.00 GASTROESOPHAGEAL REFLUX DISEASE WITH ESOPHAGITIS WITHOUT HEMORRHAGE: ICD-10-CM

## 2021-12-27 DIAGNOSIS — R68.81 EARLY SATIETY: ICD-10-CM

## 2021-12-27 DIAGNOSIS — K31.84 GASTROPARESIS: ICD-10-CM

## 2021-12-27 DIAGNOSIS — R63.4 WEIGHT LOSS: Primary | ICD-10-CM

## 2021-12-27 LAB
ERYTHROCYTE [DISTWIDTH] IN BLOOD BY AUTOMATED COUNT: 12.8 % (ref 10–15)
ERYTHROCYTE [SEDIMENTATION RATE] IN BLOOD BY WESTERGREN METHOD: 8 MM/HR (ref 0–30)
HCT VFR BLD AUTO: 40.5 % (ref 35–47)
HGB BLD-MCNC: 14 G/DL (ref 11.7–15.7)
MCH RBC QN AUTO: 30.9 PG (ref 26.5–33)
MCHC RBC AUTO-ENTMCNC: 34.6 G/DL (ref 31.5–36.5)
MCV RBC AUTO: 89 FL (ref 78–100)
PLATELET # BLD AUTO: 218 10E3/UL (ref 150–450)
RBC # BLD AUTO: 4.53 10E6/UL (ref 3.8–5.2)
WBC # BLD AUTO: 3.7 10E3/UL (ref 4–11)

## 2021-12-27 PROCEDURE — 85027 COMPLETE CBC AUTOMATED: CPT | Performed by: FAMILY MEDICINE

## 2021-12-27 PROCEDURE — 86140 C-REACTIVE PROTEIN: CPT | Performed by: FAMILY MEDICINE

## 2021-12-27 PROCEDURE — 80053 COMPREHEN METABOLIC PANEL: CPT | Performed by: FAMILY MEDICINE

## 2021-12-27 PROCEDURE — 99214 OFFICE O/P EST MOD 30 MIN: CPT | Performed by: FAMILY MEDICINE

## 2021-12-27 PROCEDURE — 36415 COLL VENOUS BLD VENIPUNCTURE: CPT | Performed by: FAMILY MEDICINE

## 2021-12-27 PROCEDURE — 85652 RBC SED RATE AUTOMATED: CPT | Performed by: FAMILY MEDICINE

## 2021-12-27 RX ORDER — UBIDECARENONE 75 MG
100 CAPSULE ORAL DAILY
COMMUNITY
End: 2022-04-20

## 2021-12-27 ASSESSMENT — MIFFLIN-ST. JEOR: SCORE: 1091.1

## 2021-12-27 NOTE — PROGRESS NOTES
Assessment & Plan       ICD-10-CM    1. Weight loss  R63.4 Comprehensive metabolic panel (BMP + Alb, Alk Phos, ALT, AST, Total. Bili, TP)     CBC with platelets     ESR: Erythrocyte sedimentation rate     CRP, inflammation   2. Early satiety  R68.81 Comprehensive metabolic panel (BMP + Alb, Alk Phos, ALT, AST, Total. Bili, TP)     CBC with platelets     ESR: Erythrocyte sedimentation rate     CRP, inflammation   3. Gastroesophageal reflux disease with esophagitis without hemorrhage  K21.00 Comprehensive metabolic panel (BMP + Alb, Alk Phos, ALT, AST, Total. Bili, TP)   4. LPRD (laryngopharyngeal reflux disease)  K21.9 Comprehensive metabolic panel (BMP + Alb, Alk Phos, ALT, AST, Total. Bili, TP)   5. Chronic cough  R05.3 Comprehensive metabolic panel (BMP + Alb, Alk Phos, ALT, AST, Total. Bili, TP)     CBC with platelets     ESR: Erythrocyte sedimentation rate     CRP, inflammation   6. Gastroparesis  K31.84 Comprehensive metabolic panel (BMP + Alb, Alk Phos, ALT, AST, Total. Bili, TP)   7. Medication monitoring encounter  Z51.81 Comprehensive metabolic panel (BMP + Alb, Alk Phos, ALT, AST, Total. Bili, TP)     CBC with platelets     ESR: Erythrocyte sedimentation rate     CRP, inflammation       Discussed treatment/modality options, including risk and benefits, she desires:    1) good nutrition, grazing type diet    2) slow weight gain    3) labs     4) follow up in 3-4 months    All diagnosis above reviewed and noted above, otherwise stable.      See [facility] orders for further details.      Return in about 3 months (around 3/27/2022) for Medication Recheck Visit, Follow Up Chronic.    No LOS data to display    Doing chart review, history and exam, documentation and further activities as noted.           Pa Montana MD, FAAFP     [facility]  [address] [place] [id number]  [email or web address]   Office: [phone]  Fax:  [phone]  Pager: [phone]       Subjective   Denia is a 66 year old who presents for the following health issues     HPI     12/27/2021    discuss weight loss - slight weight gain - today weight 120 lb - lap fátima in 9/2021 - appetite ok, but early satiety, some burping, belching, heartburn    Wt Readings from Last 4 Encounters:   12/27/21 54.5 kg (120 lb 3.2 oz)   10/04/21 52.6 kg (115 lb 14.4 oz)   09/23/21 51.8 kg (114 lb 1.6 oz)   09/15/21 51.8 kg (114 lb 3.2 oz)     Dr Ramirez - gastroparesis,  GI disagreed    Dr Dixon - will need Mitral replacement in the future      9/23/2021    Since discharge, no issues, active, walking, limited lifting, sleep good, bowels, constipation improved, bladder fine, appetite same, early satiety, less nausea, no vomiting, avoiding fats, finish augmentin tomorrow, has not started remeron yet, concerned about possible low white count, as has been borderline, overall feels like at 60% better     Hospital Follow-up Visit:     Hospital/Nursing Home/IP Rehab Facility: [facility]  Date of Admission: 09/15/21  Date of Discharge: 09/18/21  Reason(s) for Admission: cholecystectomy       Was your hospitalization related to COVID-19? No   Problems taking medications regularly:  None  Medication changes since discharge: None  Problems adhering to non-medication therapy:  None     Summary of hospitalization:  [facility] discharge summary reviewed  Diagnostic Tests/Treatments reviewed.  Follow up needed: GI/Cardiology  Other Healthcare Providers Involved in Patient s Care:         None  Update since discharge: improved.         Post Discharge Medication Reconciliation: discharge medications reconciled, continue medications without change.  Plan of care communicated with patient                  [facility]  Discharge Summary        Denia Dailey MRN# [id number]   YOB: 1955 Age: 66 year old      Date of  Admission:             9/15/2021  Date of Discharge:              9/18/2021         Discharge Diagnoses/Problem Oriented Hospital Course (Providers):    Denia Dailey was admitted on 9/15/2021 by Sadia De Guzman MD and I would refer you to their history and physical.  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     Chest pain most likely from biliary dyskinesia with symptomatic cholelithiasis and biliary colic contributing S/p lap cholecystectomy on 9/17/21  Elevated LFTs  Febrile illness  Patient presents with sudden onset chest pain described a ache with associated diaphoresis.  D-dimer was mildly elevated but chest CT was negative for pulmonary embolus or other causes of chest pain.  Troponin x3 and EKG negative.  LFT on 9/16 show elevated transaminases in 200s (recently normal 5/2021). Bili 1.2.  CT also noted gallbladder stones vs sludge. Denies significant alcohol or tylenol use. Abdominal exam is benign.   She has not had recurrence of her pain since admission.   -Stress echo 9/16 with negative EKG portion, unable to fully assess for presence/absence of WMA  -HIDA scan was done on 9/16/2021 due to elevated LFTs and her symptoms of chest pain and diaphoresis and ongoing nausea and inability to eat.   HIDA scan returned the abnormal with suggesting biliary dyskinesia  General surgery consulted and laparoscopic cholecystectomy done on 9/17/21  She had a fever of 101  F overnight on 9/16/21 blood cultures has been sent.  Chest CT was negative for pneumonia on admission.   Blood cultures have been obtained overnight are pending  Started Zosyn empirically .  We will plan on 7-day course of oral  augmentib antibiotics on discharge     Hematuria   UA showed moderate blood otherwise negative. Repeat UA in 1week if still blood present in urine needs out patient follow up with Urology      GERD.  Poor appetite and weight loss   -Continue daily omeprazole, reports current symptoms not consistent with GERD sxs   Pt wants trial a course of Remeron to see if it will improve her appetite      Moderate mitral regurgitation.  Seen on recent echocardiogram.  -Follow-up with cardiology as arranged     Unintentional weight loss.  Has lost 40 pounds with early satiety.  Gastric emptying study showed decreased motility but her to doctors disagree with how much this is impacting her weight loss.  EGD showed some flattening of the stomach folds but was otherwise normal.  Biopsies were taken.  Does have a history of H. pylori and developed C. difficile a few months after treatment.  -Await biopsy results and follow-up with GI     Chronic cough.  -Continue inhaler as outpatient  -Continue nighttime Robitussin cough medication as needed nightly     Hypocalcemia.  Admission calcium level was 7.9.  She does take calcium and vitamin D supplements. Improved on recheck with normal ionized calcium. Continue supplements at discharge.      DVT Prophylaxis: Pneumatic Compression Devices  Code Status: Full Code     9/14/2021 EGD     Stomach, random, biopsy:  -Chronic active gastritis.  -Intestinal metaplasia.     8/30/2021     She eats 2-3 servings of fruits and vegetables daily.She consumes 1 sweetened beverage(s) daily.She exercises with enough effort to increase her heart rate 30 to 60 minutes per day.  She exercises with enough effort to increase her heart rate 4 days per week. She is missing 3 dose(s) of medications per week.  She is not taking prescribed medications regularly due to remembering to take.     Answers for HPI/ROS submitted by the patient on 8/30/2021  If you checked off any problems, how difficult have these problems  "made it for you to do your work, take care of things at home, or get along with other people?: Not difficult at all  PHQ9 TOTAL SCORE: 0  SHANI 7 TOTAL SCORE: 0     Gets full easily, appetite decreased, 1/2 portions, weight is declining, max weight was 160, losing about 10 lbs per year - no burping, no belching, no heartburn, no change with increased omeprazole, noticing clothes feel big, seen GI (Dr Ramirez/Soys), H Pylori in 2016, C Diff in 2018, salmonella afterwards, CT/Video swallow in 2018 negative, CT colonography negative except diverticulosis, delayed gastric emptying time - minimal, celiac negative - works 12 hrs shift at [employer] - nutrition consult was negative/unrevealing       Review of Systems   CONSTITUTIONAL: NEGATIVE for fever, chills, change in weight  INTEGUMENTARY/SKIN: NEGATIVE for worrisome rashes, moles or lesions  EYES: NEGATIVE for vision changes or irritation  ENT/MOUTH: NEGATIVE for ear, mouth and throat problems  RESP: NEGATIVE for significant cough or SOB  CV: NEGATIVE for chest pain, palpitations or peripheral edema  GI: NEGATIVE for nausea, abdominal pain, heartburn, or change in bowel habits  : NEGATIVE for frequency, dysuria, or hematuria  MUSCULOSKELETAL: NEGATIVE for significant arthralgias or myalgia  NEURO: NEGATIVE for weakness, dizziness or paresthesias  ENDOCRINE: NEGATIVE for temperature intolerance, skin/hair changes  HEME: NEGATIVE for bleeding problems  PSYCHIATRIC: NEGATIVE for changes in mood or affect      Objective    /83 (BP Location: Right arm, Patient Position: Sitting)   Pulse 80   Temp 97.9  F (36.6  C) (Tympanic)   Ht 1.651 m (5' 5\")   Wt 54.5 kg (120 lb 3.2 oz)   LMP 08/08/2009   SpO2 100%   BMI 20.00 kg/m    Body mass index is 20 kg/m .  Physical Exam   GENERAL: healthy, alert and no distress  EYES: Eyes grossly normal to inspection, PERRL and conjunctivae and sclerae normal  HENT: ear canals and TM's normal, nose and mouth without ulcers or " lesions  NECK: no adenopathy, no asymmetry, masses, or scars and thyroid normal to palpation  RESP: lungs clear to auscultation - no rales, rhonchi or wheezes  CV: regular rate and rhythm, normal S1 S2, no S3 or S4, no murmur, click or rub, no peripheral edema and peripheral pulses strong  ABDOMEN: soft, nontender, no hepatosplenomegaly, no masses and bowel sounds normal  MS: no gross musculoskeletal defects noted, no edema  SKIN: no suspicious lesions or rashes  NEURO: Normal strength and tone, mentation intact and speech normal  PSYCH: mentation appears normal, affect normal/bright    Labs reviewed, pending

## 2021-12-27 NOTE — LETTER
[facility]  [address] [place] [address]  [phone]                    January 4, 2022    Denia Dailey  [address] [address]      Dear Denia,    Here is a summary of your recent test results:      Labs have significantly improved     We advise:     Continue current cares with good, balanced diet, watch weight     For additional lab test information, [email or web address] is an excellent reference.     Your test results are enclosed.      Please contact me if you have any questions.    In addition, here is a list of due or overdue Health Maintenance reminders.    Health Maintenance Due   Topic Date Due     Pneumococcal Vaccine (2 of 4 - PPSV23) 07/19/2021     PHQ-2  01/01/2022     Pneumococcal Vaccine (2 of 4 - PPSV23) 07/19/2021       Please call us at [phone] (or use [employer]) to address the above recommendations.            Thank you very much for trusting [facility].     Healthy regards,          Pa Montana M.D.        Results for orders placed or performed in visit on 12/27/21   CRP, inflammation     Status: Normal   Result Value Ref Range    CRP Inflammation <2.9 0.0 - 8.0 mg/L   ESR: Erythrocyte sedimentation rate     Status: Normal   Result Value Ref Range    Erythrocyte Sedimentation Rate 8 0 - 30 mm/hr   CBC with platelets     Status: Abnormal   Result Value Ref Range    WBC Count 3.7 (L) 4.0 - 11.0 10e3/uL    RBC Count 4.53 3.80 - 5.20 10e6/uL    Hemoglobin 14.0 11.7 - 15.7 g/dL    Hematocrit 40.5 35.0 - 47.0 %    MCV 89 78 - 100 fL    MCH 30.9 26.5 - 33.0 pg    MCHC 34.6 31.5 - 36.5 g/dL    RDW 12.8 10.0 - 15.0 %    Platelet Count 218 150 - 450 10e3/uL   Comprehensive metabolic panel (BMP + Alb, Alk Phos, ALT, AST, Total. Bili, TP)     Status: Normal   Result Value Ref Range    Sodium 141 133 - 144 mmol/L    Potassium 4.1 3.4 - 5.3 mmol/L    Chloride 109 94 - 109 mmol/L    Carbon Dioxide (CO2) 25 20 - 32 mmol/L     Anion Gap 7 3 - 14 mmol/L    Urea Nitrogen 16 7 - 30 mg/dL    Creatinine 0.70 0.52 - 1.04 mg/dL    Calcium 9.0 8.5 - 10.1 mg/dL    Glucose 71 70 - 99 mg/dL    Alkaline Phosphatase 107 40 - 150 U/L    AST 17 0 - 45 U/L    ALT 27 0 - 50 U/L    Protein Total 6.9 6.8 - 8.8 g/dL    Albumin 3.9 3.4 - 5.0 g/dL    Bilirubin Total 0.3 0.2 - 1.3 mg/dL    GFR Estimate >90 >60 mL/min/1.73m2

## 2021-12-28 LAB
ALBUMIN SERPL-MCNC: 3.9 G/DL (ref 3.4–5)
ALP SERPL-CCNC: 107 U/L (ref 40–150)
ALT SERPL W P-5'-P-CCNC: 27 U/L (ref 0–50)
ANION GAP SERPL CALCULATED.3IONS-SCNC: 7 MMOL/L (ref 3–14)
AST SERPL W P-5'-P-CCNC: 17 U/L (ref 0–45)
BILIRUB SERPL-MCNC: 0.3 MG/DL (ref 0.2–1.3)
BUN SERPL-MCNC: 16 MG/DL (ref 7–30)
CALCIUM SERPL-MCNC: 9 MG/DL (ref 8.5–10.1)
CHLORIDE BLD-SCNC: 109 MMOL/L (ref 94–109)
CO2 SERPL-SCNC: 25 MMOL/L (ref 20–32)
CREAT SERPL-MCNC: 0.7 MG/DL (ref 0.52–1.04)
CRP SERPL-MCNC: <2.9 MG/L (ref 0–8)
GFR SERPL CREATININE-BSD FRML MDRD: >90 ML/MIN/1.73M2
GLUCOSE BLD-MCNC: 71 MG/DL (ref 70–99)
POTASSIUM BLD-SCNC: 4.1 MMOL/L (ref 3.4–5.3)
PROT SERPL-MCNC: 6.9 G/DL (ref 6.8–8.8)
SODIUM SERPL-SCNC: 141 MMOL/L (ref 133–144)

## 2022-01-14 ENCOUNTER — PRE VISIT (OUTPATIENT)
Dept: ONCOLOGY | Facility: CLINIC | Age: 67
End: 2022-01-14

## 2022-01-28 PROBLEM — I34.0 MITRAL REGURGITATION: Status: ACTIVE | Noted: 2021-07-20

## 2022-02-25 ENCOUNTER — TRANSFERRED RECORDS (OUTPATIENT)
Dept: HEALTH INFORMATION MANAGEMENT | Facility: CLINIC | Age: 67
End: 2022-02-25
Payer: COMMERCIAL

## 2022-04-15 ENCOUNTER — HOSPITAL ENCOUNTER (OUTPATIENT)
Dept: CARDIOLOGY | Facility: CLINIC | Age: 67
Discharge: HOME | End: 2022-04-15
Attending: INTERNAL MEDICINE | Admitting: INTERNAL MEDICINE
Payer: COMMERCIAL

## 2022-04-15 DIAGNOSIS — I34.0 NONRHEUMATIC MITRAL VALVE REGURGITATION: ICD-10-CM

## 2022-04-15 LAB — LVEF ECHO: NORMAL

## 2022-04-15 PROCEDURE — 93306 TTE W/DOPPLER COMPLETE: CPT

## 2022-04-15 PROCEDURE — 93306 TTE W/DOPPLER COMPLETE: CPT | Mod: 26 | Performed by: INTERNAL MEDICINE

## 2022-04-19 NOTE — PROGRESS NOTES
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    Ms Dailey was found to have a murmur on routine physical exam last fall and an ECHO was arranged (8/2021)  This showed LVEF 60-65%, normal RV function, 3+ mitral regurgitation with borderline MV prolapse (ERO 0.11 cm2    Before her cardiovascular evaluation, she was hospitalized (9/2021) for chest pain and was found to have biliary dyskinesia, biliary colic,with symptomatic cholelithiasis and subsequently underwent a lap cholecystectomy. A stress ECHO was performed, but imaging was poor. The EKG did not show any concern for ischemia.    She recently underwent a surveillance 6 month ECHO    Our visit today is for further review.    Ms Dailey is still working and feels well  She denies any cardiovascular complaints  She walks a few miles every day and denies any limitations  She specifically denies palpitations, chest pain, shortness of breath, orthopnea, or peripheral edema.         I reviewed her ECHO (4/15/22)  This showed LVEF 55-60%, normal RV function, 2-3+ MR (ERO 0.20, regurg volume 34 ml) mild MV prolapse of posterior leaflet    VITAL SIGNS:  BP: 112/78  Pulse:  91  Weight:  125 lbs (stable)  BMI: 20      IMPRESSION AND PLAN:    Moderate-Severe Mitral Regurgitation  -ERO 0.20, regurg volume 34 ml, LVEF 55%  -mild  MV prolapse of posterior leaflet  -asymptomatic  -will arrange KAYLIN for further delineation.  I discussed the procedure, risks and benefits involved to include but not limited to:  Reaction to sedation, throat irritation, dysrhythmias, esophageal perforation, or stroke  Pt in agreement and willing to proceed.  -she deferred starting beta-blocker      Hyperlipidemia:  -ASCVD 10 year risk < 7.5%, statin not recommended at this time  -LDL  117    The total time for the visit today was 20  minutes which includes patient visit, reviewing of records, discussion, and placing of orders of the outpatient coordination of cardiovascular care as described.  The level of medical decision making during this visit was of mild/moderate complexity.  Thank you for allowing me to participate in their care.    Orders Placed This Encounter   Procedures     KAYLIN Only- Transesophageal Echocardiogram       Orders Placed This Encounter   Medications     Cholecalciferol (VITAMIN D3 PO)     Sig: Take by mouth daily     BIOTIN 5000 PO     cyanocobalamin (CYANOCOBALAMIN) 1000 MCG/ML injection     Sig: Inject 1 mL into the muscle every 30 days       Medications Discontinued During This Encounter   Medication Reason     cyanocobalamin (VITAMIN B-12) 100 MCG tablet Medication Reconciliation Clean Up     Probiotic Product (PROBIOTIC PO) Medication Reconciliation Clean Up         Encounter Diagnosis   Name Primary?     Nonrheumatic mitral valve regurgitation        CURRENT MEDICATIONS:  Current Outpatient Medications   Medication Sig Dispense Refill     BIOTIN 5000 PO        CALCIUM + D 600-200 MG-UNIT OR TABS Take 2 tablets by mouth 2 times daily       Cholecalciferol (VITAMIN D3 PO) Take by mouth daily       cyanocobalamin (CYANOCOBALAMIN) 1000 MCG/ML injection Inject 1 mL into the muscle every 30 days       magnesium 250 MG tablet Take 1 tablet by mouth daily. 100 tablet 3     omeprazole (PRILOSEC) 40 MG DR capsule Take 1 capsule (40 mg) by mouth daily 180 capsule 3     mirtazapine (REMERON) 15 MG tablet Take 1 tablet (15 mg) by mouth At Bedtime (Patient not taking: Reported on 4/20/2022) 90 tablet 1       ALLERGIES     Allergies   Allergen Reactions     Vioxx      stomach upset     Nickel Rash     Accompanied by itching and swelling       PAST MEDICAL HISTORY:  Past Medical History:   Diagnosis Date     C. difficile colitis      CARDIOVASCULAR SCREENING; LDL GOAL LESS THAN 130      Chronic cough      Gastroparesis 2019     mild/moderate - [facility]     GERD (gastroesophageal reflux disease)      Heart disease 06/2021     Helicobacter pylori (H. pylori) infection      LPRD (laryngopharyngeal reflux disease)     Dr Hernandez - cough     Mitral regurgitation     moderate to severe, mild MVP, Dr Dixon     Nephrolithiasis 05/2021    bilateral, small     Right leg numbness     Residual from lumbosacral neuritis     Thoracic or lumbosacral neuritis or radiculitis, unspecified 05/02/2007    Disc Herniation at L4-5, L5-S1     Unintentional weight loss 2021       PAST SURGICAL HISTORY:  Past Surgical History:   Procedure Laterality Date     ABDOMEN SURGERY  9/2021    Lap fátima     BUNIONECTOMY  2009     CHOLECYSTECTOMY  09/2021     COLONOSCOPY  7/09, 9/19    Diverticulosis, Ct Colonography, due 5 yrs     DAVINCI LAPAROSCOPIC CHOLECYSTECTOMY WITH GRAMS N/A 9/17/2021    Procedure: CHOLECYSTECTOMY, ROBOT-ASSISTED, LAPAROSCOPIC, WITH CHOLANGIOGRAM;  Surgeon: Chante Willis MD;  Location:  OR     ESOPHAGOSCOPY, GASTROSCOPY, DUODENOSCOPY (EGD), COMBINED N/A 03/15/2016    chronic gastritis     ESOPHAGOSCOPY, GASTROSCOPY, DUODENOSCOPY (EGD), COMBINED N/A 09/14/2021    Procedure: ESOPHAGOGASTRODUODENOSCOPY, WITH BIOPSies using biopsy forceps;  Surgeon: Tomasz Andres MD;  Location: [facility]     Fractured jaw  1966     ORTHOPEDIC SURGERY Right 2009    bunnionectomy     SURGICAL HISTORY OF -   09/2021    Lap Fátima - Dr Willis     UPPER GI ENDOSCOPY  03/2016    Erythematous mucosa in the greater curvature.      ZZC CLOSED RX NOSE/JAW FRAC+WIRES  1966     Z DISKECTOMY,PERCUTANEOUS LUMBAR  07/10/2007    L5-S1 with nerve root decompression       FAMILY HISTORY:  Family History   Problem Relation Age of Onset     Breast Cancer Mother      Cancer Father         renal cell CA     Coronary Artery Disease Maternal Grandmother      Coronary Artery Disease Paternal Grandmother      Breast Cancer Maternal Aunt      Coronary Artery Disease Paternal Uncle        SOCIAL  "HISTORY:  Social History     Socioeconomic History     Marital status:      Spouse name: Haroldo     Number of children: 0     Years of education: 16     Highest education level: None   Occupational History     Occupation: NURSE     Employer: [employer]   Tobacco Use     Smoking status: Former Smoker     Packs/day: 0.00     Years: 0.00     Pack years: 0.00     Smokeless tobacco: Never Used     Tobacco comment: Light   Substance and Sexual Activity     Alcohol use: Not Currently     Alcohol/week: 0.0 - 1.0 standard drinks     Comment: Social 1-2 glasses of wine/month     Drug use: No     Sexual activity: Never   Other Topics Concern     Parent/sibling w/ CABG, MI or angioplasty before 65F 55M? 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  Respiratory:  Positive for cough chronic cough   Cardiovascular:  Negative      Gastroenterology: Positive for reflux    Genitourinary:  Negative      Musculoskeletal:  Positive for back pain;joint pain chronic back pain,  Neurologic:  Positive for migraine headaches;numbness or tingling of feet occular migraines, riught foot  Psychiatric:  Negative      Heme/Lymph/Imm:  Negative      Endocrine:  Negative        Physical Exam:  Vitals: /78 (BP Location: Right arm, Patient Position: Sitting, Cuff Size: Adult Regular)   Pulse 91   Ht 1.651 m (5' 5\")   Wt 56.7 kg (125 lb)   LMP 08/08/2009   SpO2 99%   BMI 20.80 kg/m      Constitutional:  cooperative;well nourished thin      Skin:  warm and dry to the touch          Head:  normocephalic        Eyes:  pupils equal and round        Lymph:      ENT:  no pallor or cyanosis        Neck:  JVP normal;no carotid bruit        Respiratory:  clear to auscultation;normal respiratory excursion         Cardiac: regular rhythm;normal S1 and S2       holosystolic murmur;grade 1;grade 2        pulses full and equal         "                                GI:  abdomen soft        Extremities and Muscular Skeletal:  no edema              Neurological:  affect appropriate        Psych:  Alert and Oriented x 3          CC  Alex Dixon MD  [address] [address]

## 2022-04-20 ENCOUNTER — OFFICE VISIT (OUTPATIENT)
Dept: CARDIOLOGY | Facility: CLINIC | Age: 67
End: 2022-04-20
Attending: INTERNAL MEDICINE
Payer: COMMERCIAL

## 2022-04-20 ENCOUNTER — HOSPITAL ENCOUNTER (OUTPATIENT)
Facility: CLINIC | Age: 67
End: 2022-04-20
Attending: NURSE PRACTITIONER | Admitting: INTERNAL MEDICINE
Payer: COMMERCIAL

## 2022-04-20 VITALS
OXYGEN SATURATION: 99 % | BODY MASS INDEX: 20.83 KG/M2 | WEIGHT: 125 LBS | DIASTOLIC BLOOD PRESSURE: 78 MMHG | SYSTOLIC BLOOD PRESSURE: 112 MMHG | HEART RATE: 91 BPM | HEIGHT: 65 IN

## 2022-04-20 DIAGNOSIS — I34.0 NONRHEUMATIC MITRAL VALVE REGURGITATION: ICD-10-CM

## 2022-04-20 DIAGNOSIS — Z11.59 ENCOUNTER FOR SCREENING FOR OTHER VIRAL DISEASES: Primary | ICD-10-CM

## 2022-04-20 PROCEDURE — 99213 OFFICE O/P EST LOW 20 MIN: CPT | Performed by: NURSE PRACTITIONER

## 2022-04-20 RX ORDER — CYANOCOBALAMIN 1000 UG/ML
1 INJECTION, SOLUTION INTRAMUSCULAR; SUBCUTANEOUS
COMMUNITY

## 2022-04-20 NOTE — LETTER
4/20/2022    Pa Montana MD  [address] [facility] [id number]    RE: Denia Dailey       Dear Colleague,     I had the pleasure of seeing Denia Dailey in the [facility].  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    Ms Dailey was found to have a murmur on routine physical exam last fall and an ECHO was arranged (8/2021)  This showed LVEF 60-65%, normal RV function, 3+ mitral regurgitation with borderline MV prolapse (ERO 0.11 cm2    Before her cardiovascular evaluation, she was hospitalized (9/2021) for chest pain and was found to have biliary dyskinesia, biliary colic,with symptomatic cholelithiasis and subsequently underwent a lap cholecystectomy. A stress ECHO was performed, but imaging was poor. The EKG did not show any concern for ischemia.    She recently underwent a surveillance 6 month ECHO    Our visit today is for further review.    Ms Dailey is still working and feels well  She denies any cardiovascular complaints  She walks a few miles every day and denies any limitations  She specifically denies palpitations, chest pain, shortness of breath, orthopnea, or peripheral edema.         I reviewed her ECHO (4/15/22)  This showed LVEF 55-60%, normal RV function, 2-3+ MR (ERO 0.20, regurg volume 34 ml) mild MV prolapse of posterior leaflet    VITAL SIGNS:  BP: 112/78  Pulse:  91  Weight:  125 lbs (stable)  BMI: 20      IMPRESSION AND PLAN:    Moderate-Severe Mitral Regurgitation  -ERO 0.20, regurg volume 34 ml, LVEF 55%  -mild  MV prolapse of posterior leaflet  -asymptomatic  -will arrange KAYLIN for further delineation.  I discussed the procedure, risks and benefits involved to include but not limited to:  Reaction to sedation, throat irritation, dysrhythmias, esophageal perforation, or stroke  Pt in agreement  and willing to proceed.  -she deferred starting beta-blocker      Hyperlipidemia:  -ASCVD 10 year risk < 7.5%, statin not recommended at this time  -LDL  117    The total time for the visit today was 20 minutes which includes patient visit, reviewing of records, discussion, and placing of orders of the outpatient coordination of cardiovascular care as described.  The level of medical decision making during this visit was of mild/moderate complexity.  Thank you for allowing me to participate in their care.    Orders Placed This Encounter   Procedures     KAYLIN Only- Transesophageal Echocardiogram       Orders Placed This Encounter   Medications     Cholecalciferol (VITAMIN D3 PO)     Sig: Take by mouth daily     BIOTIN 5000 PO     cyanocobalamin (CYANOCOBALAMIN) 1000 MCG/ML injection     Sig: Inject 1 mL into the muscle every 30 days       Medications Discontinued During This Encounter   Medication Reason     cyanocobalamin (VITAMIN B-12) 100 MCG tablet Medication Reconciliation Clean Up     Probiotic Product (PROBIOTIC PO) Medication Reconciliation Clean Up         Encounter Diagnosis   Name Primary?     Nonrheumatic mitral valve regurgitation        CURRENT MEDICATIONS:  Current Outpatient Medications   Medication Sig Dispense Refill     BIOTIN 5000 PO        CALCIUM + D 600-200 MG-UNIT OR TABS Take 2 tablets by mouth 2 times daily       Cholecalciferol (VITAMIN D3 PO) Take by mouth daily       cyanocobalamin (CYANOCOBALAMIN) 1000 MCG/ML injection Inject 1 mL into the muscle every 30 days       magnesium 250 MG tablet Take 1 tablet by mouth daily. 100 tablet 3     omeprazole (PRILOSEC) 40 MG DR capsule Take 1 capsule (40 mg) by mouth daily 180 capsule 3     mirtazapine (REMERON) 15 MG tablet Take 1 tablet (15 mg) by mouth At Bedtime (Patient not taking: Reported on 4/20/2022) 90 tablet 1       ALLERGIES     Allergies   Allergen Reactions     Vioxx      stomach upset     Nickel Rash     Accompanied by itching and  swelling       PAST MEDICAL HISTORY:  Past Medical History:   Diagnosis Date     C. difficile colitis      CARDIOVASCULAR SCREENING; LDL GOAL LESS THAN 130      Chronic cough      Gastroparesis 2019    mild/moderate - [facility]     GERD (gastroesophageal reflux disease)      Heart disease 06/2021     Helicobacter pylori (H. pylori) infection      LPRD (laryngopharyngeal reflux disease)     Dr Hernandez - [facility]     Mitral regurgitation     moderate to severe, mild MVP, Dr Dixon     Nephrolithiasis 05/2021    bilateral, small     Right leg numbness     Residual from lumbosacral neuritis     Thoracic or lumbosacral neuritis or radiculitis, unspecified 05/02/2007    Disc Herniation at L4-5, L5-S1     Unintentional weight loss 2021       PAST SURGICAL HISTORY:  Past Surgical History:   Procedure Laterality Date     ABDOMEN SURGERY  9/2021    Lap amanda     BUNIONECTOMY  2009     CHOLECYSTECTOMY  09/2021     COLONOSCOPY  7/09, 9/19    Diverticulosis, Ct Colonography, due 5 yrs     DAVINCI LAPAROSCOPIC CHOLECYSTECTOMY WITH GRAMS N/A 9/17/2021    Procedure: CHOLECYSTECTOMY, ROBOT-ASSISTED, LAPAROSCOPIC, WITH CHOLANGIOGRAM;  Surgeon: Chante Willis MD;  Location:  OR     ESOPHAGOSCOPY, GASTROSCOPY, DUODENOSCOPY (EGD), COMBINED N/A 03/15/2016    chronic gastritis     ESOPHAGOSCOPY, GASTROSCOPY, DUODENOSCOPY (EGD), COMBINED N/A 09/14/2021    Procedure: ESOPHAGOGASTRODUODENOSCOPY, WITH BIOPSies using biopsy forceps;  Surgeon: Tomasz Andres MD;  Location: [facility]     Fractured jaw  1966     ORTHOPEDIC SURGERY Right 2009    bunnionectomy     SURGICAL HISTORY OF -   09/2021    Lap Amanda - Dr Willis     UPPER GI ENDOSCOPY  03/2016    Erythematous mucosa in the greater curvature.      ZZC CLOSED RX NOSE/JAW FRAC+WIRES  1966     Z DISKECTOMY,PERCUTANEOUS LUMBAR  07/10/2007    L5-S1 with nerve root decompression       FAMILY HISTORY:  Family History   Problem Relation Age of Onset     Breast Cancer Mother      Cancer Father          "renal cell CA     Coronary Artery Disease Maternal Grandmother      Coronary Artery Disease Paternal Grandmother      Breast Cancer Maternal Aunt      Coronary Artery Disease Paternal Uncle        SOCIAL HISTORY:  Social History     Socioeconomic History     Marital status:      Spouse name: Haroldo     Number of children: 0     Years of education: 16     Highest education level: None   Occupational History     Occupation: NURSE     Employer: [employer],[address]   Tobacco Use     Smoking status: Former Smoker     Packs/day: 0.00     Years: 0.00     Pack years: 0.00     Smokeless tobacco: Never Used     Tobacco comment: Light   Substance and Sexual Activity     Alcohol use: Not Currently     Alcohol/week: 0.0 - 1.0 standard drinks     Comment: Social 1-2 glasses of wine/month     Drug use: No     Sexual activity: Never   Other Topics Concern     Parent/sibling w/ CABG, MI or angioplasty before 65F 55M? 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  Respiratory:  Positive for cough chronic cough   Cardiovascular:  Negative      Gastroenterology: Positive for reflux    Genitourinary:  Negative      Musculoskeletal:  Positive for back pain;joint pain chronic back pain,  Neurologic:  Positive for migraine headaches;numbness or tingling of feet occular migraines, riught foot  Psychiatric:  Negative      Heme/Lymph/Imm:  Negative      Endocrine:  Negative        Physical Exam:  Vitals: /78 (BP Location: Right arm, Patient Position: Sitting, Cuff Size: Adult Regular)   Pulse 91   Ht 1.651 m (5' 5\")   Wt 56.7 kg (125 lb)   LMP 08/08/2009   SpO2 99%   BMI 20.80 kg/m      Constitutional:  cooperative;well nourished thin      Skin:  warm and dry to the touch          Head:  normocephalic        Eyes:  pupils equal and round        Lymph:      ENT:  no pallor or cyanosis        Neck:  JVP normal;no carotid " bruit        Respiratory:  clear to auscultation;normal respiratory excursion         Cardiac: regular rhythm;normal S1 and S2       holosystolic murmur;grade 1;grade 2        pulses full and equal                                        GI:  abdomen soft        Extremities and Muscular Skeletal:  no edema              Neurological:  affect appropriate        Psych:  Alert and Oriented x 3          CC  Alex Dixon MD  [address] [place] [address]    Thank you for allowing me to participate in the care of your patient.      Sincerely,     STEPHANIE [facility]

## 2022-05-07 ENCOUNTER — LAB (OUTPATIENT)
Dept: URGENT CARE | Facility: URGENT CARE | Age: 67
End: 2022-05-07
Payer: COMMERCIAL

## 2022-05-07 DIAGNOSIS — Z11.59 ENCOUNTER FOR SCREENING FOR OTHER VIRAL DISEASES: ICD-10-CM

## 2022-05-07 PROCEDURE — U0005 INFEC AGEN DETEC AMPLI PROBE: HCPCS

## 2022-05-07 PROCEDURE — U0003 INFECTIOUS AGENT DETECTION BY NUCLEIC ACID (DNA OR RNA); SEVERE ACUTE RESPIRATORY SYNDROME CORONAVIRUS 2 (SARS-COV-2) (CORONAVIRUS DISEASE [COVID-19]), AMPLIFIED PROBE TECHNIQUE, MAKING USE OF HIGH THROUGHPUT TECHNOLOGIES AS DESCRIBED BY CMS-2020-01-R: HCPCS

## 2022-05-08 LAB — SARS-COV-2 RNA RESP QL NAA+PROBE: NEGATIVE

## 2022-05-10 ENCOUNTER — TELEPHONE (OUTPATIENT)
Dept: CARDIOLOGY | Facility: CLINIC | Age: 67
End: 2022-05-10
Payer: COMMERCIAL

## 2022-05-10 NOTE — TELEPHONE ENCOUNTER
Called patient to preview pre-procedure instructions: patient is scheduled for KAYLIN on 5/11/22, arriving at 9:30 am for procedure at 11:30 am. Patient has no known swallowing disorder or history of esophageal bleeding/varices. Patient is aware to be NPO for 8 hours prior to procedure except for medications. Patient is not diabetic. Patient has arranged for transportation and someone to stay with them after the procedure. Patient had a COVID test on 5/7/22 that was negative. Patient was instructed to take their temperature the morning of procedure and if temperature > 100 degrees F patient should not come in and call [phone]. Reviewed visitor policy. Patient verbalized understanding of all instructions. Follow up scheduled on 5/20/22 with NEELIMA Tavarez.

## 2022-05-11 ENCOUNTER — HOSPITAL ENCOUNTER (OUTPATIENT)
Dept: CARDIOLOGY | Facility: CLINIC | Age: 67
Discharge: HOME | End: 2022-05-11
Attending: NURSE PRACTITIONER | Admitting: NURSE PRACTITIONER
Payer: COMMERCIAL

## 2022-05-11 VITALS
RESPIRATION RATE: 16 BRPM | DIASTOLIC BLOOD PRESSURE: 72 MMHG | HEART RATE: 74 BPM | OXYGEN SATURATION: 100 % | SYSTOLIC BLOOD PRESSURE: 111 MMHG

## 2022-05-11 DIAGNOSIS — I34.0 NONRHEUMATIC MITRAL VALVE REGURGITATION: ICD-10-CM

## 2022-05-11 LAB — LVEF ECHO: NORMAL

## 2022-05-11 PROCEDURE — 99152 MOD SED SAME PHYS/QHP 5/>YRS: CPT | Performed by: INTERNAL MEDICINE

## 2022-05-11 PROCEDURE — 93325 DOPPLER ECHO COLOR FLOW MAPG: CPT | Mod: 26 | Performed by: INTERNAL MEDICINE

## 2022-05-11 PROCEDURE — 93312 ECHO TRANSESOPHAGEAL: CPT | Mod: 26 | Performed by: INTERNAL MEDICINE

## 2022-05-11 PROCEDURE — 250N000011 HC RX IP 250 OP 636: Performed by: NURSE PRACTITIONER

## 2022-05-11 PROCEDURE — 250N000009 HC RX 250: Performed by: NURSE PRACTITIONER

## 2022-05-11 PROCEDURE — 93320 DOPPLER ECHO COMPLETE: CPT | Mod: 26 | Performed by: INTERNAL MEDICINE

## 2022-05-11 PROCEDURE — 93325 DOPPLER ECHO COLOR FLOW MAPG: CPT

## 2022-05-11 RX ORDER — DEXTROSE MONOHYDRATE 25 G/50ML
9.5 INJECTION, SOLUTION INTRAVENOUS
Status: DISCONTINUED | OUTPATIENT
Start: 2022-05-11 | End: 2022-05-12 | Stop reason: HOSPADM

## 2022-05-11 RX ORDER — NALOXONE HYDROCHLORIDE 0.4 MG/ML
0.4 INJECTION, SOLUTION INTRAMUSCULAR; INTRAVENOUS; SUBCUTANEOUS
Status: DISCONTINUED | OUTPATIENT
Start: 2022-05-11 | End: 2022-05-12 | Stop reason: HOSPADM

## 2022-05-11 RX ORDER — NALOXONE HYDROCHLORIDE 0.4 MG/ML
INJECTION, SOLUTION INTRAMUSCULAR; INTRAVENOUS; SUBCUTANEOUS
Status: DISCONTINUED
Start: 2022-05-11 | End: 2022-05-11 | Stop reason: WASHOUT

## 2022-05-11 RX ORDER — LIDOCAINE 50 MG/G
OINTMENT TOPICAL ONCE
Status: DISCONTINUED | OUTPATIENT
Start: 2022-05-11 | End: 2022-05-12 | Stop reason: HOSPADM

## 2022-05-11 RX ORDER — NALOXONE HYDROCHLORIDE 0.4 MG/ML
0.2 INJECTION, SOLUTION INTRAMUSCULAR; INTRAVENOUS; SUBCUTANEOUS
Status: DISCONTINUED | OUTPATIENT
Start: 2022-05-11 | End: 2022-05-12 | Stop reason: HOSPADM

## 2022-05-11 RX ORDER — SODIUM CHLORIDE 9 MG/ML
INJECTION, SOLUTION INTRAVENOUS CONTINUOUS PRN
Status: DISCONTINUED | OUTPATIENT
Start: 2022-05-11 | End: 2022-05-12 | Stop reason: HOSPADM

## 2022-05-11 RX ORDER — LIDOCAINE HYDROCHLORIDE 40 MG/ML
1.5 SOLUTION TOPICAL ONCE
Status: DISCONTINUED | OUTPATIENT
Start: 2022-05-11 | End: 2022-05-12 | Stop reason: HOSPADM

## 2022-05-11 RX ORDER — FENTANYL CITRATE 50 UG/ML
INJECTION, SOLUTION INTRAMUSCULAR; INTRAVENOUS
Status: DISPENSED
Start: 2022-05-11 | End: 2022-05-11

## 2022-05-11 RX ORDER — LIDOCAINE 40 MG/G
CREAM TOPICAL
Status: DISCONTINUED | OUTPATIENT
Start: 2022-05-11 | End: 2022-05-12 | Stop reason: HOSPADM

## 2022-05-11 RX ORDER — FLUMAZENIL 0.1 MG/ML
0.2 INJECTION, SOLUTION INTRAVENOUS
Status: DISCONTINUED | OUTPATIENT
Start: 2022-05-11 | End: 2022-05-12 | Stop reason: HOSPADM

## 2022-05-11 RX ORDER — GLYCOPYRROLATE 0.2 MG/ML
0.1 INJECTION, SOLUTION INTRAMUSCULAR; INTRAVENOUS ONCE
Status: COMPLETED | OUTPATIENT
Start: 2022-05-11 | End: 2022-05-11

## 2022-05-11 RX ORDER — LIDOCAINE HYDROCHLORIDE 20 MG/ML
SOLUTION OROPHARYNGEAL
Status: DISPENSED
Start: 2022-05-11 | End: 2022-05-11

## 2022-05-11 RX ORDER — LIDOCAINE HYDROCHLORIDE 20 MG/ML
15 SOLUTION OROPHARYNGEAL ONCE
Status: COMPLETED | OUTPATIENT
Start: 2022-05-11 | End: 2022-05-11

## 2022-05-11 RX ORDER — GLYCOPYRROLATE 0.2 MG/ML
INJECTION INTRAMUSCULAR; INTRAVENOUS
Status: DISPENSED
Start: 2022-05-11 | End: 2022-05-11

## 2022-05-11 RX ORDER — FLUMAZENIL 0.1 MG/ML
INJECTION, SOLUTION INTRAVENOUS
Status: DISCONTINUED
Start: 2022-05-11 | End: 2022-05-11 | Stop reason: WASHOUT

## 2022-05-11 RX ORDER — FENTANYL CITRATE 50 UG/ML
25 INJECTION, SOLUTION INTRAMUSCULAR; INTRAVENOUS
Status: DISCONTINUED | OUTPATIENT
Start: 2022-05-11 | End: 2022-05-12 | Stop reason: HOSPADM

## 2022-05-11 RX ADMIN — MIDAZOLAM 0.5 MG: 1 INJECTION INTRAMUSCULAR; INTRAVENOUS at 11:50

## 2022-05-11 RX ADMIN — TOPICAL ANESTHETIC 1 ML: 200 SPRAY DENTAL; PERIODONTAL at 11:48

## 2022-05-11 RX ADMIN — TOPICAL ANESTHETIC 0.5 ML: 200 SPRAY DENTAL; PERIODONTAL at 11:05

## 2022-05-11 RX ADMIN — MIDAZOLAM 0.5 MG: 1 INJECTION INTRAMUSCULAR; INTRAVENOUS at 12:00

## 2022-05-11 RX ADMIN — MIDAZOLAM 0.5 MG: 1 INJECTION INTRAMUSCULAR; INTRAVENOUS at 11:56

## 2022-05-11 RX ADMIN — GLYCOPYRROLATE 0.1 MG: 0.2 INJECTION, SOLUTION INTRAMUSCULAR; INTRAVENOUS at 11:05

## 2022-05-11 RX ADMIN — FENTANYL CITRATE 25 MCG: 50 INJECTION, SOLUTION INTRAMUSCULAR; INTRAVENOUS at 11:51

## 2022-05-11 RX ADMIN — FENTANYL CITRATE 25 MCG: 50 INJECTION, SOLUTION INTRAMUSCULAR; INTRAVENOUS at 11:52

## 2022-05-11 RX ADMIN — MIDAZOLAM 1 MG: 1 INJECTION INTRAMUSCULAR; INTRAVENOUS at 11:52

## 2022-05-11 RX ADMIN — LIDOCAINE HYDROCHLORIDE 15 ML: 20 SOLUTION ORAL; TOPICAL at 11:05

## 2022-05-11 NOTE — PROGRESS NOTES
KAYLIN performed without complication.  Pt tolerated procedure well.  Sedation recovery in procedure room without complication.  Discharge instructions reviewed with the pt and her mother.  No further questions at this time.  Pt discharged home.

## 2022-05-11 NOTE — PRE-PROCEDURE
GENERAL PRE-PROCEDURE:     Verbal consent obtained?: Yes    Written consent obtained?: Yes    Risks and benefits: Risks, benefits and alternatives were discussed    Consent given by:  Patient  Patient states understanding of procedure being performed: Yes    Patient's understanding of procedure matches consent: Yes    Procedure consent matches procedure scheduled: Yes    Appropriately NPO:  Yes  Mallampati  :  Grade 2- soft palate, base of uvula, tonsillar pillars, and portion of posterior pharyngeal wall visible  Lungs:  Lungs clear with good breath sounds bilaterally  Heart:  Normal heart sounds and rate  History & Physical reviewed:  History and physical reviewed and no updates needed  Statement of review:  I have reviewed the lab findings, diagnostic data, medications, and the plan for sedation      Risks/benefits of KAYLIN with risks including but not limited to risk of sedation, esophageal injury, need for emergent surgery were discussed with patient.  Patient understands the rational of KAYLIN, the risk involved and wishes to proceed with it.  She has been appropriately n.p.o.

## 2022-05-11 NOTE — DISCHARGE INSTRUCTIONS
Taking Care of Yourself after Trans-Esophageal Echocardiogram (KAYLIN) (Inpatient or Outpatient)    Patient's Name: Denia Dailey  Today's Date: May 11, 2022    You had a: Trans-Esophageal Echocardiogram (KAYLIN)  Your doctor was Dr. Mckenzie    Activity and diet  Do not drive, drink alcohol or make major decisions for 24 hours. The medicines you received may make you dizzy, sleepy or forgetful.  An adult should stay with you for the first six hours at home. Take it easy for the rest of the day.  Return to your usual diet, but no scratchy foods for two days.  If your throat is sore, eat cold, bland or soft foods.  You may have heartburn if the tube used in the exam entered your stomach. If so:  Do not eat acidic and spicy foods.  Do not eat three hours before bedtime. Clear liquids are okay.  When lying down, use two pillows to raise your head.  Medicines  You may start taking your usual medicines again. Always follow your doctor s orders.  You may take Tylenol (acetaminophen) if your throat is sore.  You may take antacids if you have heartburn. Take as directed.  Call your family doctor if you have any of these problems:  Heartburn that is severe or lasts more than 72 hours.  A sore throat that feels worse after 72 hours.  Shortness of breath, neck pain, chest pain, fever, chills, coughing up blood, or other unusual signs.  If you also had cardioversion:  The skin on your chest or back may feel tender for 48 hours. If your skin is tender, you may:  Use cold packs.  Apply 1% hydrocortisone cream to the skin (sold at drug stores).  Take Advil (ibuprofen) or Tylenol (acetaminophen). Follow your doctor s orders.  Call your doctor right a way if you have an irregular heartbeat, shortness of breath or feel dizzy.  Follow-up visits:  Call the [facility] at [phone] for a follow-up visit in with Dr. Dixon.

## 2022-05-16 ENCOUNTER — TELEPHONE (OUTPATIENT)
Dept: CARDIOLOGY | Facility: CLINIC | Age: 67
End: 2022-05-16
Payer: COMMERCIAL

## 2022-05-16 NOTE — TELEPHONE ENCOUNTER
Patient returned call and will follow up with an Echo per Dr. Dixon's review.  Has a follow up visit on 5-20-22 with Daysi.  Patient verbalized understanding.    Dr. Dixon reviewed your KAYLIN with the following comments and Daysi will also review at your follow up visit 5-20-22     If she continues to feel asymptomatic, no intervention is required at this time other than usual BP control, etc....  Would recommend another TTE in 1 year, or sooner if new symptoms develop. Per Dr. Dixon

## 2022-05-20 ENCOUNTER — OFFICE VISIT (OUTPATIENT)
Dept: CARDIOLOGY | Facility: CLINIC | Age: 67
End: 2022-05-20
Payer: COMMERCIAL

## 2022-05-20 VITALS
OXYGEN SATURATION: 100 % | DIASTOLIC BLOOD PRESSURE: 78 MMHG | WEIGHT: 122.9 LBS | HEIGHT: 65 IN | SYSTOLIC BLOOD PRESSURE: 104 MMHG | HEART RATE: 78 BPM | BODY MASS INDEX: 20.48 KG/M2

## 2022-05-20 DIAGNOSIS — I34.0 NONRHEUMATIC MITRAL VALVE REGURGITATION: Primary | ICD-10-CM

## 2022-05-20 PROCEDURE — 99214 OFFICE O/P EST MOD 30 MIN: CPT | Performed by: NURSE PRACTITIONER

## 2022-05-20 RX ORDER — FAMOTIDINE 10 MG
10 TABLET ORAL 2 TIMES DAILY
COMMUNITY
End: 2024-09-30

## 2022-05-20 NOTE — PATIENT INSTRUCTIONS
No medication changes today  Follow up with Dr. Dixon next year with echocardiogram prior  Please call with any questions/concerns [phone]

## 2022-05-20 NOTE — PROGRESS NOTES
Cardiology Clinic Progress Note  Denia Dailey MRN# [id number]   YOB: 1955 Age: 66 year old   Primary Cardiologist: Dr. Dixon  Reason for visit: Cardiology follow up             Assessment and Plan:   Denia Dailey is a very pleasant 66 year old female whom I am seeing today for cardiology follow up after KAYLIN.     1.  Moderate to moderate-severe mitral regurgitation - KAYLIN 5/2022 multiple jets and mechanism appears to be mix of mild posterior leaflet prolapse and degenerative valve disease  2. Hyperlipidemia     I saw Denia today for cardiology follow up after KAYLIN which showed LVEF 60-65%, RV normal size/function, moderate - mod-severe mitral regurgitation, multiple jets, mechanism apears to mix of mild posterior leaflet prolapse and degenerative valve disease. We reviewed results, all questions answered. She continues to feel well with good exercise capacity with no noted symptoms. We reviewed continued monitoring with echocardiogram yearly and sooner if changes in symptoms.     Changes today: none    Follow up plan:     Cardiology follow up with Dr. Dixon in 1 year with echocardiogram prior. If changes in symptoms would recommend echo sooner.         History of Presenting Illness:    Denia Dailey is a very pleasant 66 year old female with a history of mitral regurgitation, hyperlipidemia and mild mitral prolapse.     Primary cardiologist Dr. Dixon who she started following related to her mitral valve disease. She was admitted for chest pain which was secondary to biliary dyskinesia with symptomatic cholelithiasis s/p laparoscopic cholecystectomy on 9/17/2021. She had an echocardiogram 8/2021 which showed mitral valve prolapse and mitral regurgitation, prompting cardiology referral.     She underwent an exercise stress echo 9/2021, sub-optimal image quality, she exercised for a total of 9 minutes on a Yonis protocol, achieving a total of 10 METS and 97% of her maximal predicted heart  rate. EKG was unremarkable other than a 3 beat run of NSVT and occasional PVCs, no obvious echocardiographic findings suggestive of ischemia.     Echocardiogram 4/2022 moderate to mod-severe (2-3+) mitral regurgitation with ERO 0.20 and regurg volume 34cc. LVEF 55-60%. RV normal size/function.     KAYLIN 5/11/22 showed LVEF 60-65%, RV normal size/function, moderate - mod-severe mitral regurgitation, multiple jets, mechanism apears to mix of mild posterior leaflet prolapse and degenerative valve disease.    Patient is here today for cardiology follow up after KAYLIN    Patient reports feeling good. Monitoring weights at home, weight is typically ~ 125#. Denies shortness of breath at rest. Some exertion dyspnea with more strenuous activity, walking quickly up hill. Denies orthopnea or PND. Denies chest pain or chest tightness. Denies dizziness, lightheadedness or other presyncopal symptoms. Denies tachycardia or palpitations. Taking medications daily.     Blood pressure 104/78 and HR 78 in clinic today.    Appetite good. Eating most meals at home, does note she gets full quickly. Walking 3 miles daily. Denies alcohol use. Denies tobacco use.         Social History      Social History     Socioeconomic History     Marital status:      Spouse name: Haroldo     Number of children: 0     Years of education: 16     Highest education level: Not on file   Occupational History     Occupation: NURSE     Employer: [employer],[address]   Tobacco Use     Smoking status: Former Smoker     Packs/day: 0.00     Years: 0.00     Pack years: 0.00     Smokeless tobacco: Never Used     Tobacco comment: Light   Substance and Sexual Activity     Alcohol use: Not Currently     Alcohol/week: 0.0 - 1.0 standard drinks     Comment: Social 1-2 glasses of wine/month     Drug use: No     Sexual activity: Never   Other Topics Concern      Service Not Asked     Blood Transfusions Not Asked     Caffeine Concern Not Asked  "    Occupational Exposure Not Asked     Hobby Hazards Not Asked     Sleep Concern Not Asked     Stress Concern Not Asked     Weight Concern Not Asked     Special Diet Not Asked     Back Care Not Asked     Exercise Not Asked     Bike Helmet Not Asked     Seat Belt Not Asked     Self-Exams Not Asked     Parent/sibling w/ CABG, MI or angioplasty before 65F 55M? No   Social History Narrative     Not on file     Social Determinants of Health     Financial Resource Strain: Not on file   Food Insecurity: Not on file   Transportation Needs: Not on file   Physical Activity: Not on file   Stress: Not on file   Social Connections: Not on file   Intimate Partner Violence: Not on file   Housing Stability: Not on file            Review of Systems:   Skin:  Negative     Eyes:  Positive for glasses  ENT:  Positive for deafness  Respiratory:  Positive for cough  Cardiovascular:    Positive for;chest pain;lightheadedness  Gastroenterology: Positive for reflux  Genitourinary:       Musculoskeletal:  Positive for back pain;joint pain  Neurologic:  Positive for migraine headaches;numbness or tingling of feet  Psychiatric:  Negative    Heme/Lymph/Imm:  Negative    Endocrine:  Negative           Physical Exam:   Vitals: /78   Pulse 78   Ht 1.651 m (5' 5\")   Wt 55.7 kg (122 lb 14.4 oz)   LMP 08/08/2009   SpO2 100%   BMI 20.45 kg/m     Wt Readings from Last 4 Encounters:   05/20/22 55.7 kg (122 lb 14.4 oz)   04/20/22 56.7 kg (125 lb)   12/27/21 54.5 kg (120 lb 3.2 oz)   10/04/21 52.6 kg (115 lb 14.4 oz)     GEN: well nourished, in no acute distress.  HEENT:  Pupils equal, round. Sclerae nonicteric.   NECK: Supple, no masses appreciated.   C/V:  Regular rate and rhythm, soft holosystolic murmur  RESP: Respirations are unlabored. Clear to auscultation bilaterally without wheezing, rales, or rhonchi.  GI: Abdomen soft, nontender.  EXTREM: No LE edema.  NEURO: Alert and oriented, cooperative.  SKIN: Warm and dry.       Data: "     LIPID RESULTS:  Lab Results   Component Value Date    CHOL 202 (H) 05/24/2021    HDL 60 05/24/2021     (H) 05/24/2021    TRIG 127 05/24/2021    CHOLHDLRATIO 4.2 10/08/2012     LIVER ENZYME RESULTS:  Lab Results   Component Value Date    AST 17 12/27/2021    AST 20 05/24/2021    ALT 27 12/27/2021    ALT 25 05/24/2021     CBC RESULTS:  Lab Results   Component Value Date    WBC 3.7 (L) 12/27/2021    WBC 3.0 (L) 05/24/2021    RBC 4.53 12/27/2021    RBC 4.48 05/24/2021    HGB 14.0 12/27/2021    HGB 13.9 05/24/2021    HCT 40.5 12/27/2021    HCT 40.3 05/24/2021    MCV 89 12/27/2021    MCV 90 05/24/2021    MCH 30.9 12/27/2021    MCH 31.0 05/24/2021    MCHC 34.6 12/27/2021    MCHC 34.5 05/24/2021    RDW 12.8 12/27/2021    RDW 12.9 05/24/2021     12/27/2021     05/24/2021     BMP RESULTS:  Lab Results   Component Value Date     12/27/2021     05/24/2021    POTASSIUM 4.1 12/27/2021    POTASSIUM 3.9 05/24/2021    CHLORIDE 109 12/27/2021    CHLORIDE 108 05/24/2021    CO2 25 12/27/2021    CO2 28 05/24/2021    ANIONGAP 7 12/27/2021    ANIONGAP 6 05/24/2021    GLC 71 12/27/2021    GLC 86 05/24/2021    BUN 16 12/27/2021    BUN 12 05/24/2021    CR 0.70 12/27/2021    CR 0.79 05/24/2021    GFRESTIMATED >90 12/27/2021    GFRESTIMATED 79 05/24/2021    GFRESTBLACK >90 05/24/2021    KEYA 9.0 12/27/2021    KEYA 8.7 05/24/2021      INR RESULTS:  Lab Results   Component Value Date    INR 0.97 06/24/2016    INR 0.98 07/09/2007            Medications     Current Outpatient Medications   Medication Sig Dispense Refill     BIOTIN 5000 PO Take by mouth daily 1 tab BID       CALCIUM + D 600-200 MG-UNIT OR TABS Take 2 tablets by mouth 2 times daily       Cholecalciferol (VITAMIN D3 PO) Take by mouth daily       cyanocobalamin (CYANOCOBALAMIN) 1000 MCG/ML injection Inject 1 mL into the muscle every 30 days       diclofenac (VOLTAREN) 1 % topical gel Apply topically 4 times daily       famotidine (PEPCID) 10 MG  tablet Take 10 mg by mouth 2 times daily OTC PRN       magnesium 250 MG tablet Take 1 tablet by mouth daily. 100 tablet 3     omeprazole (PRILOSEC) 40 MG DR capsule Take 1 capsule (40 mg) by mouth daily 180 capsule 3     mirtazapine (REMERON) 15 MG tablet Take 1 tablet (15 mg) by mouth At Bedtime (Patient not taking: Reported on 5/20/2022) 90 tablet 1          Past Medical History     Past Medical History:   Diagnosis Date     C. difficile colitis      CARDIOVASCULAR SCREENING; LDL GOAL LESS THAN 130      Chronic cough      Gastroparesis 2019    mild/moderate - [facility]     GERD (gastroesophageal reflux disease)      Heart disease 06/2021     Helicobacter pylori (H. pylori) infection      LPRD (laryngopharyngeal reflux disease)     Dr Hernandez - [facility]     Mitral regurgitation     moderate to severe, mild MVP, Dr Dixon     Nephrolithiasis 05/2021    bilateral, small     Right leg numbness     Residual from lumbosacral neuritis     Thoracic or lumbosacral neuritis or radiculitis, unspecified 05/02/2007    Disc Herniation at L4-5, L5-S1     Unintentional weight loss 2021     Past Surgical History:   Procedure Laterality Date     ABDOMEN SURGERY  9/2021    Lap amanda     BUNIONECTOMY  2009     CHOLECYSTECTOMY  09/2021     COLONOSCOPY  7/09, 9/19    Diverticulosis, Ct Colonography, due 5 yrs     DAVINCI LAPAROSCOPIC CHOLECYSTECTOMY WITH GRAMS N/A 9/17/2021    Procedure: CHOLECYSTECTOMY, ROBOT-ASSISTED, LAPAROSCOPIC, WITH CHOLANGIOGRAM;  Surgeon: Chante Willis MD;  Location:  OR     ESOPHAGOSCOPY, GASTROSCOPY, DUODENOSCOPY (EGD), COMBINED N/A 03/15/2016    chronic gastritis     ESOPHAGOSCOPY, GASTROSCOPY, DUODENOSCOPY (EGD), COMBINED N/A 09/14/2021    Procedure: ESOPHAGOGASTRODUODENOSCOPY, WITH BIOPSies using biopsy forceps;  Surgeon: Tomasz Andres MD;  Location: [facility]     Fractured jaw  1966     ORTHOPEDIC SURGERY Right 2009    bunnionectomy     SURGICAL HISTORY OF -   09/2021    Ramiro Amanda - Dr Willis     UPPER GI  ENDOSCOPY  03/2016    Erythematous mucosa in the greater curvature.      ZZC CLOSED RX NOSE/JAW FRAC+WIRES  1966     ZZC DISKECTOMY,PERCUTANEOUS LUMBAR  07/10/2007    L5-S1 with nerve root decompression     Family History   Problem Relation Age of Onset     Breast Cancer Mother      Cancer Father         renal cell CA     Coronary Artery Disease Maternal Grandmother      Coronary Artery Disease Paternal Grandmother      Breast Cancer Maternal Aunt      Coronary Artery Disease Paternal Uncle             Allergies   Vioxx and Nickel    30 minutes spent on the date of the encounter doing chart review, history and exam, documentation and further activities as noted above      STEPHANIE [facility]  Pager: [phone]

## 2022-05-20 NOTE — LETTER
5/20/2022    Pa Montana MD  [address] [facility] [address]    RE: Denia Dailey       Dear Colleague,     I had the pleasure of seeing Denia Dailey in the [facility].  Cardiology Clinic Progress Note  Denia Dailey MRN# [id number]   YOB: 1955 Age: 66 year old   Primary Cardiologist: Dr. Dixon  Reason for visit: Cardiology follow up             Assessment and Plan:   Denia Dailey is a very pleasant 66 year old female whom I am seeing today for cardiology follow up after KAYLIN.     1.  Moderate to moderate-severe mitral regurgitation - KAYLIN 5/2022 multiple jets and mechanism appears to be mix of mild posterior leaflet prolapse and degenerative valve disease  2. Hyperlipidemia     I saw Denia today for cardiology follow up after KAYLIN which showed LVEF 60-65%, RV normal size/function, moderate - mod-severe mitral regurgitation, multiple jets, mechanism apears to mix of mild posterior leaflet prolapse and degenerative valve disease. We reviewed results, all questions answered. She continues to feel well with good exercise capacity with no noted symptoms. We reviewed continued monitoring with echocardiogram yearly and sooner if changes in symptoms.     Changes today: none    Follow up plan:     Cardiology follow up with Dr. Dixon in 1 year with echocardiogram prior. If changes in symptoms would recommend echo sooner.         History of Presenting Illness:    Denia Dailey is a very pleasant 66 year old female with a history of mitral regurgitation, hyperlipidemia and mild mitral prolapse.     Primary cardiologist Dr. Dixon who she started following related to her mitral valve disease. She was admitted for chest pain which was secondary to biliary dyskinesia with symptomatic cholelithiasis s/p laparoscopic cholecystectomy on 9/17/2021. She had an echocardiogram 8/2021 which showed mitral valve prolapse and mitral regurgitation, prompting cardiology referral.      She underwent an exercise stress echo 9/2021, sub-optimal image quality, she exercised for a total of 9 minutes on a Yonis protocol, achieving a total of 10 METS and 97% of her maximal predicted heart rate. EKG was unremarkable other than a 3 beat run of NSVT and occasional PVCs, no obvious echocardiographic findings suggestive of ischemia.     Echocardiogram 4/2022 moderate to mod-severe (2-3+) mitral regurgitation with ERO 0.20 and regurg volume 34cc. LVEF 55-60%. RV normal size/function.     KAYLIN 5/11/22 showed LVEF 60-65%, RV normal size/function, moderate - mod-severe mitral regurgitation, multiple jets, mechanism apears to mix of mild posterior leaflet prolapse and degenerative valve disease.    Patient is here today for cardiology follow up after KAYLIN    Patient reports feeling good. Monitoring weights at home, weight is typically ~ 125#. Denies shortness of breath at rest. Some exertion dyspnea with more strenuous activity, walking quickly up hill. Denies orthopnea or PND. Denies chest pain or chest tightness. Denies dizziness, lightheadedness or other presyncopal symptoms. Denies tachycardia or palpitations. Taking medications daily.     Blood pressure 104/78 and HR 78 in clinic today.    Appetite good. Eating most meals at home, does note she gets full quickly. Walking 3 miles daily. Denies alcohol use. Denies tobacco use.         Social History      Social History     Socioeconomic History     Marital status:      Spouse name: Haroldo     Number of children: 0     Years of education: 16     Highest education level: Not on file   Occupational History     Occupation: NURSE     Employer: [employer],[address]   Tobacco Use     Smoking status: Former Smoker     Packs/day: 0.00     Years: 0.00     Pack years: 0.00     Smokeless tobacco: Never Used     Tobacco comment: Light   Substance and Sexual Activity     Alcohol use: Not Currently     Alcohol/week: 0.0 - 1.0 standard drinks     " Comment: Social 1-2 glasses of wine/month     Drug use: No     Sexual activity: Never   Other Topics Concern      Service Not Asked     Blood Transfusions Not Asked     Caffeine Concern Not Asked     Occupational Exposure Not Asked     Hobby Hazards Not Asked     Sleep Concern Not Asked     Stress Concern Not Asked     Weight Concern Not Asked     Special Diet Not Asked     Back Care Not Asked     Exercise Not Asked     Bike Helmet Not Asked     Seat Belt Not Asked     Self-Exams Not Asked     Parent/sibling w/ CABG, MI or angioplasty before 65F 55M? No   Social History Narrative     Not on file     Social Determinants of Health     Financial Resource Strain: Not on file   Food Insecurity: Not on file   Transportation Needs: Not on file   Physical Activity: Not on file   Stress: Not on file   Social Connections: Not on file   Intimate Partner Violence: Not on file   Housing Stability: Not on file            Review of Systems:   Skin:  Negative     Eyes:  Positive for glasses  ENT:  Positive for deafness  Respiratory:  Positive for cough  Cardiovascular:    Positive for;chest pain;lightheadedness  Gastroenterology: Positive for reflux  Genitourinary:       Musculoskeletal:  Positive for back pain;joint pain  Neurologic:  Positive for migraine headaches;numbness or tingling of feet  Psychiatric:  Negative    Heme/Lymph/Imm:  Negative    Endocrine:  Negative           Physical Exam:   Vitals: /78   Pulse 78   Ht 1.651 m (5' 5\")   Wt 55.7 kg (122 lb 14.4 oz)   LMP 08/08/2009   SpO2 100%   BMI 20.45 kg/m     Wt Readings from Last 4 Encounters:   05/20/22 55.7 kg (122 lb 14.4 oz)   04/20/22 56.7 kg (125 lb)   12/27/21 54.5 kg (120 lb 3.2 oz)   10/04/21 52.6 kg (115 lb 14.4 oz)     GEN: well nourished, in no acute distress.  HEENT:  Pupils equal, round. Sclerae nonicteric.   NECK: Supple, no masses appreciated.   C/V:  Regular rate and rhythm, soft holosystolic murmur  RESP: Respirations are unlabored. " Clear to auscultation bilaterally without wheezing, rales, or rhonchi.  GI: Abdomen soft, nontender.  EXTREM: No LE edema.  NEURO: Alert and oriented, cooperative.  SKIN: Warm and dry.       Data:     LIPID RESULTS:  Lab Results   Component Value Date    CHOL 202 (H) 05/24/2021    HDL 60 05/24/2021     (H) 05/24/2021    TRIG 127 05/24/2021    CHOLHDLRATIO 4.2 10/08/2012     LIVER ENZYME RESULTS:  Lab Results   Component Value Date    AST 17 12/27/2021    AST 20 05/24/2021    ALT 27 12/27/2021    ALT 25 05/24/2021     CBC RESULTS:  Lab Results   Component Value Date    WBC 3.7 (L) 12/27/2021    WBC 3.0 (L) 05/24/2021    RBC 4.53 12/27/2021    RBC 4.48 05/24/2021    HGB 14.0 12/27/2021    HGB 13.9 05/24/2021    HCT 40.5 12/27/2021    HCT 40.3 05/24/2021    MCV 89 12/27/2021    MCV 90 05/24/2021    MCH 30.9 12/27/2021    MCH 31.0 05/24/2021    MCHC 34.6 12/27/2021    MCHC 34.5 05/24/2021    RDW 12.8 12/27/2021    RDW 12.9 05/24/2021     12/27/2021     05/24/2021     BMP RESULTS:  Lab Results   Component Value Date     12/27/2021     05/24/2021    POTASSIUM 4.1 12/27/2021    POTASSIUM 3.9 05/24/2021    CHLORIDE 109 12/27/2021    CHLORIDE 108 05/24/2021    CO2 25 12/27/2021    CO2 28 05/24/2021    ANIONGAP 7 12/27/2021    ANIONGAP 6 05/24/2021    GLC 71 12/27/2021    GLC 86 05/24/2021    BUN 16 12/27/2021    BUN 12 05/24/2021    CR 0.70 12/27/2021    CR 0.79 05/24/2021    GFRESTIMATED >90 12/27/2021    GFRESTIMATED 79 05/24/2021    GFRESTBLACK >90 05/24/2021    KEYA 9.0 12/27/2021    KEYA 8.7 05/24/2021      INR RESULTS:  Lab Results   Component Value Date    INR 0.97 06/24/2016    INR 0.98 07/09/2007            Medications     Current Outpatient Medications   Medication Sig Dispense Refill     BIOTIN 5000 PO Take by mouth daily 1 tab BID       CALCIUM + D 600-200 MG-UNIT OR TABS Take 2 tablets by mouth 2 times daily       Cholecalciferol (VITAMIN D3 PO) Take by mouth daily        cyanocobalamin (CYANOCOBALAMIN) 1000 MCG/ML injection Inject 1 mL into the muscle every 30 days       diclofenac (VOLTAREN) 1 % topical gel Apply topically 4 times daily       famotidine (PEPCID) 10 MG tablet Take 10 mg by mouth 2 times daily OTC PRN       magnesium 250 MG tablet Take 1 tablet by mouth daily. 100 tablet 3     omeprazole (PRILOSEC) 40 MG DR capsule Take 1 capsule (40 mg) by mouth daily 180 capsule 3     mirtazapine (REMERON) 15 MG tablet Take 1 tablet (15 mg) by mouth At Bedtime (Patient not taking: Reported on 5/20/2022) 90 tablet 1          Past Medical History     Past Medical History:   Diagnosis Date     C. difficile colitis      CARDIOVASCULAR SCREENING; LDL GOAL LESS THAN 130      Chronic cough      Gastroparesis 2019    mild/moderate - [facility]     GERD (gastroesophageal reflux disease)      Heart disease 06/2021     Helicobacter pylori (H. pylori) infection      LPRD (laryngopharyngeal reflux disease)     Dr Hernandez - baron     Mitral regurgitation     moderate to severe, mild MVP, Dr Dixon     Nephrolithiasis 05/2021    bilateral, small     Right leg numbness     Residual from lumbosacral neuritis     Thoracic or lumbosacral neuritis or radiculitis, unspecified 05/02/2007    Disc Herniation at L4-5, L5-S1     Unintentional weight loss 2021     Past Surgical History:   Procedure Laterality Date     ABDOMEN SURGERY  9/2021    Lap fátima     BUNIONECTOMY  2009     CHOLECYSTECTOMY  09/2021     COLONOSCOPY  7/09, 9/19    Diverticulosis, Ct Colonography, due 5 yrs     DAVINCI LAPAROSCOPIC CHOLECYSTECTOMY WITH GRAMS N/A 9/17/2021    Procedure: CHOLECYSTECTOMY, ROBOT-ASSISTED, LAPAROSCOPIC, WITH CHOLANGIOGRAM;  Surgeon: Chante Willis MD;  Location:  OR     ESOPHAGOSCOPY, GASTROSCOPY, DUODENOSCOPY (EGD), COMBINED N/A 03/15/2016    chronic gastritis     ESOPHAGOSCOPY, GASTROSCOPY, DUODENOSCOPY (EGD), COMBINED N/A 09/14/2021    Procedure: ESOPHAGOGASTRODUODENOSCOPY, WITH BIOPSies using biopsy  forceps;  Surgeon: Tomasz Andres MD;  Location: [facility]     Fractured jaw  1966     ORTHOPEDIC SURGERY Right 2009    bunnionectomy     SURGICAL HISTORY OF -   09/2021    Lap Amanda - Dr Willis     UPPER GI ENDOSCOPY  03/2016    Erythematous mucosa in the greater curvature.      ZZC CLOSED RX NOSE/JAW FRAC+WIRES  1966     ZZC DISKECTOMY,PERCUTANEOUS LUMBAR  07/10/2007    L5-S1 with nerve root decompression     Family History   Problem Relation Age of Onset     Breast Cancer Mother      Cancer Father         renal cell CA     Coronary Artery Disease Maternal Grandmother      Coronary Artery Disease Paternal Grandmother      Breast Cancer Maternal Aunt      Coronary Artery Disease Paternal Uncle             Allergies   Vioxx and Nickel    30 minutes spent on the date of the encounter doing chart review, history and exam, documentation and further activities as noted above    STEPHANIE Stark CNP  [facility]  Pager: [phone]    Thank you for allowing me to participate in the care of your patient.    Sincerely,   STEPHANIE Stark CNP   [facility]  cc: No referring provider defined for this encounter.

## 2022-07-27 ENCOUNTER — HOSPITAL ENCOUNTER (OUTPATIENT)
Dept: MAMMOGRAPHY | Facility: CLINIC | Age: 67
Discharge: HOME | End: 2022-07-27
Attending: OBSTETRICS & GYNECOLOGY | Admitting: OBSTETRICS & GYNECOLOGY
Payer: COMMERCIAL

## 2022-07-27 DIAGNOSIS — Z12.31 VISIT FOR SCREENING MAMMOGRAM: ICD-10-CM

## 2022-07-27 PROCEDURE — 77067 SCR MAMMO BI INCL CAD: CPT

## 2022-07-31 ENCOUNTER — HEALTH MAINTENANCE LETTER (OUTPATIENT)
Age: 67
End: 2022-07-31

## 2022-08-03 ENCOUNTER — OFFICE VISIT (OUTPATIENT)
Dept: FAMILY MEDICINE | Facility: CLINIC | Age: 67
End: 2022-08-03
Payer: COMMERCIAL

## 2022-08-03 VITALS
OXYGEN SATURATION: 100 % | TEMPERATURE: 97.7 F | BODY MASS INDEX: 19.99 KG/M2 | DIASTOLIC BLOOD PRESSURE: 72 MMHG | WEIGHT: 120 LBS | SYSTOLIC BLOOD PRESSURE: 110 MMHG | HEART RATE: 75 BPM | HEIGHT: 65 IN

## 2022-08-03 DIAGNOSIS — R63.4 WEIGHT LOSS: ICD-10-CM

## 2022-08-03 DIAGNOSIS — Z51.81 MEDICATION MONITORING ENCOUNTER: ICD-10-CM

## 2022-08-03 DIAGNOSIS — M25.512 CHRONIC LEFT SHOULDER PAIN: Primary | ICD-10-CM

## 2022-08-03 DIAGNOSIS — G89.29 CHRONIC LEFT SHOULDER PAIN: Primary | ICD-10-CM

## 2022-08-03 DIAGNOSIS — K31.84 GASTROPARESIS: ICD-10-CM

## 2022-08-03 DIAGNOSIS — R68.81 EARLY SATIETY: ICD-10-CM

## 2022-08-03 DIAGNOSIS — K21.00 GASTROESOPHAGEAL REFLUX DISEASE WITH ESOPHAGITIS WITHOUT HEMORRHAGE: ICD-10-CM

## 2022-08-03 DIAGNOSIS — K21.9 LPRD (LARYNGOPHARYNGEAL REFLUX DISEASE): ICD-10-CM

## 2022-08-03 DIAGNOSIS — R05.3 CHRONIC COUGH: ICD-10-CM

## 2022-08-03 PROBLEM — R07.9 CHEST PAIN: Status: RESOLVED | Noted: 2021-09-15 | Resolved: 2022-08-03

## 2022-08-03 PROBLEM — K80.50 BILIARY COLIC: Status: RESOLVED | Noted: 2021-09-17 | Resolved: 2022-08-03

## 2022-08-03 PROCEDURE — 99214 OFFICE O/P EST MOD 30 MIN: CPT | Performed by: FAMILY MEDICINE

## 2022-08-03 NOTE — PROGRESS NOTES
Assessment & Plan       ICD-10-CM    1. Chronic left shoulder pain  M25.512 REVIEW OF HEALTH MAINTENANCE PROTOCOL ORDERS    G89.29 XR Shoulder Left G/E 3 Views     MR Shoulder Left w/o Contrast   2. Weight loss  R63.4 REVIEW OF HEALTH MAINTENANCE PROTOCOL ORDERS   3. Early satiety  R68.81 REVIEW OF HEALTH MAINTENANCE PROTOCOL ORDERS   4. Gastroesophageal reflux disease with esophagitis without hemorrhage  K21.00 REVIEW OF HEALTH MAINTENANCE PROTOCOL ORDERS   5. LPRD (laryngopharyngeal reflux disease)  K21.9 REVIEW OF HEALTH MAINTENANCE PROTOCOL ORDERS   6. Chronic cough  R05.3 REVIEW OF HEALTH MAINTENANCE PROTOCOL ORDERS   7. Gastroparesis  K31.84 REVIEW OF HEALTH MAINTENANCE PROTOCOL ORDERS   8. Medication monitoring encounter  Z51.81 REVIEW OF HEALTH MAINTENANCE PROTOCOL ORDERS       Discussed treatment/modality options, including risk and benefits, she desires:    1) restart remeron, follow weights    2) consider GI/Nutrition second opinions    3) heat/ice/stretching, voltaren gel    4) xrays/MRI left shoulder    5) Dr Kirk - Ob/Gyn exam tomorrow    6) recent KAYLIN - upcoming repeat Echo 4/2023 - MV regurg    7) follow B12 - Dr Stuart    8) CPX due soon    All diagnosis above reviewed and noted above, otherwise stable.      See EpicCare orders for further details.      Return in about 2 months (around 10/3/2022).    No LOS data to display    Doing chart review, history and exam, documentation and further activities as noted.           Pa Montana MD, FAAFP     [facility]  [address] [place] [id number]  [email or web address]   Office: [phone]  Fax: [phone]  Pager: [phone]       Marilia Loza is a 67 year old, presenting for the following health issues:    Musculoskeletal Problem and Weight Loss    History of Present Illness       Reason for visit:  Weight loss and left shoulder pain    She eats 2-3  servings of fruits and vegetables daily.She consumes 0 sweetened beverage(s) daily.She exercises with enough effort to increase her heart rate 30 to 60 minutes per day.  She exercises with enough effort to increase her heart rate 5 days per week. She is missing 1 dose(s) of medications per week.  She is not taking prescribed medications regularly due to other.     Weight loss - down 3 pounds. Mentioned going back on Remeron. Worked only one day July - at work was weighing 126-127 in June. Extensive work up has been negative including, labs, imaging, GI and nutrition consults, mammo negative, has stopped remeron (did gain a little weight) early satiety, s/p lap cholecystectomy    Wt Readings from Last 4 Encounters:   08/03/22 54.4 kg (120 lb)   05/20/22 55.7 kg (122 lb 14.4 oz)   04/20/22 56.7 kg (125 lb)   12/27/21 54.5 kg (120 lb 3.2 oz)     Max weight in 2017 = 158 lbs    Work up to date has been negative    Pain History:  When did you first notice your pain? - Acute on chronic Pain   Have you seen anyone else for your pain? No  Where in your body do you have pain? Musculoskeletal problem/pain  Onset/Duration: couple years - worse in last 6 months  Description  Location: Shoulder  - left  Joint Swelling: No  Redness: No  Pain: YES- sharp pain with movement and aches  Warmth: No  Intensity:  Mild tp moderate  Progression of Symptoms:  worsening  Accompanying signs and symptoms:   Fevers: No  Numbness/tingling/weakness: YES- weakness, if raised will drop at times  History  Trauma to the area: No  Recent illness:  No  Previous similar problem: No  Previous evaluation:  No  Precipitating or alleviating factors:  Aggravating factors include: more used worse it gets - worse after working  Therapies tried and outcome: Voltaren gel at night - maybe some relief    [employer]  RN  - medical    12/27/2021     discuss weight loss - slight weight gain - today weight 120 lb - lap fátima in 9/2021 - appetite ok, but early satiety, some  burping, belching, heartburn    Dr Ramirez - gastroparesis, [facility] disagreed     Dr Dixon - will need Mitral replacement in the future     9/23/2021     Since discharge, no issues, active, walking, limited lifting, sleep good, bowels, constipation improved, bladder fine, appetite same, early satiety, less nausea, no vomiting, avoiding fats, finish augmentin tomorrow, has not started remeron yet, concerned about possible low white count, as has been borderline, overall feels like at 60% better     Hospital Follow-up Visit:     Hospital/Nursing Home/IP Rehab Facility: [facility]  Date of Admission: 09/15/21  Date of Discharge: 09/18/21  Reason(s) for Admission: cholecystectomy       Was your hospitalization related to COVID-19? No   Problems taking medications regularly:  None  Medication changes since discharge: None  Problems adhering to non-medication therapy:  None     Summary of hospitalization:  [facility] discharge summary reviewed  Diagnostic Tests/Treatments reviewed.  Follow up needed: GI/Cardiology  Other Healthcare Providers Involved in Patient s Care:         None  Update since discharge: improved.         Post Discharge Medication Reconciliation: discharge medications reconciled, continue medications without change.  Plan of care communicated with patient                  [facility]  Discharge Summary        Denia Dailey MRN# [id number]   YOB: 1955 Age: 66 year old      Date of Admission:             9/15/2021  Date of Discharge:              9/18/2021         Discharge Diagnoses/Problem Oriented Hospital Course (Providers):    Denia Dailey was admitted on 9/15/2021 by Sadia De Guzman MD and I would refer you to their history and physical.  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     Chest pain most likely from biliary dyskinesia with symptomatic cholelithiasis and biliary colic contributing S/p lap cholecystectomy on 9/17/21  Elevated LFTs  Febrile illness  Patient presents with sudden onset chest pain described a ache with associated diaphoresis.  D-dimer was mildly elevated but chest CT was negative for pulmonary embolus or other causes of chest pain.  Troponin x3 and EKG negative.  LFT on 9/16 show elevated transaminases in 200s (recently normal 5/2021). Bili 1.2.  CT also noted gallbladder stones vs sludge. Denies significant alcohol or tylenol use. Abdominal exam is benign.   She has not had recurrence of her pain since admission.   -Stress echo 9/16 with negative EKG portion, unable to fully assess for presence/absence of WMA  -HIDA scan was done on 9/16/2021 due to elevated LFTs and her symptoms of chest pain and diaphoresis and ongoing nausea and inability to eat.   HIDA scan returned the abnormal with suggesting biliary dyskinesia  General surgery consulted and laparoscopic cholecystectomy done on 9/17/21  She had a fever of 101  F overnight on 9/16/21 blood cultures has been sent.  Chest CT was negative for pneumonia on admission.   Blood cultures have been obtained overnight are pending  Started Zosyn empirically .  We will plan on 7-day course of oral augmentib antibiotics on discharge     Hematuria   UA showed moderate blood otherwise negative. Repeat UA in 1week if still blood present in urine needs out patient follow up with Urology      GERD.  Poor appetite and weight loss   -Continue daily omeprazole, reports current symptoms not consistent with GERD sxs   Pt wants trial a course of Remeron to see if it will improve her appetite      Moderate mitral regurgitation.  Seen on recent  echocardiogram.  -Follow-up with cardiology as arranged     Unintentional weight loss.  Has lost 40 pounds with early satiety.  Gastric emptying study showed decreased motility but her to doctors disagree with how much this is impacting her weight loss.  EGD showed some flattening of the stomach folds but was otherwise normal.  Biopsies were taken.  Does have a history of H. pylori and developed C. difficile a few months after treatment.  -Await biopsy results and follow-up with GI     Chronic cough.  -Continue inhaler as outpatient  -Continue nighttime Robitussin cough medication as needed nightly     Hypocalcemia.  Admission calcium level was 7.9.  She does take calcium and vitamin D supplements. Improved on recheck with normal ionized calcium. Continue supplements at discharge.      DVT Prophylaxis: Pneumatic Compression Devices  Code Status: Full Code      9/14/2021 EGD     Stomach, random, biopsy:  -Chronic active gastritis.  -Intestinal metaplasia.     8/30/2021     She eats 2-3 servings of fruits and vegetables daily.She consumes 1 sweetened beverage(s) daily.She exercises with enough effort to increase her heart rate 30 to 60 minutes per day.  She exercises with enough effort to increase her heart rate 4 days per week. She is missing 3 dose(s) of medications per week.  She is not taking prescribed medications regularly due to remembering to take.     Answers for HPI/ROS submitted by the patient on 8/30/2021  If you checked off any problems, how difficult have these problems made it for you to do your work, take care of things at home, or get along with other people?: Not difficult at all  PHQ9 TOTAL SCORE: 0  SHANI 7 TOTAL SCORE: 0     Gets full easily, appetite decreased, 1/2 portions, weight is declining, max weight was 160, losing about 10 lbs per year - no burping, no belching, no heartburn, no change with increased omeprazole, noticing clothes feel big, seen GI (Dr Ramirez[facility]), H Pylori in 2016, C  "Diff in 2018, salmonella afterwards, CT/Video swallow in 2018 negative, CT colonography negative except diverticulosis, delayed gastric emptying time - minimal, celiac negative - works 12 hrs shift at [employer] - nutrition consult was negative/unrevealing       Review of Systems   CONSTITUTIONAL: NEGATIVE for fever, chills, change in weight  INTEGUMENTARY/SKIN: NEGATIVE for worrisome rashes, moles or lesions  EYES: NEGATIVE for vision changes or irritation  ENT/MOUTH: NEGATIVE for ear, mouth and throat problems  RESP: NEGATIVE for significant cough or SOB  CV: NEGATIVE for chest pain, palpitations or peripheral edema  GI: NEGATIVE for nausea, abdominal pain, heartburn, or change in bowel habits  : NEGATIVE for frequency, dysuria, or hematuria  MUSCULOSKELETAL: NEGATIVE for significant arthralgias or myalgia  NEURO: NEGATIVE for weakness, dizziness or paresthesias  ENDOCRINE: NEGATIVE for temperature intolerance, skin/hair changes  HEME: NEGATIVE for bleeding problems  PSYCHIATRIC: NEGATIVE for changes in mood or affect      Objective    /72 (BP Location: Right arm, Patient Position: Chair, Cuff Size: Adult Regular)   Pulse 75   Temp 97.7  F (36.5  C) (Tympanic)   Ht 1.651 m (5' 5\")   Wt 54.4 kg (120 lb)   LMP 08/08/2009   SpO2 100%   Breastfeeding No   BMI 19.97 kg/m    Body mass index is 19.97 kg/m .  Physical Exam   GENERAL: healthy, alert and no distress  EYES: Eyes grossly normal to inspection, PERRL and conjunctivae and sclerae normal  HENT: ear canals and TM's normal, nose and mouth without ulcers or lesions  NECK: no adenopathy, no asymmetry, masses, or scars and thyroid normal to palpation  RESP: lungs clear to auscultation - no rales, rhonchi or wheezes  CV: regular rate and rhythm, normal S1 S2, no S3 or S4, no murmur, click or rub, no peripheral edema and peripheral pulses strong  ABDOMEN: soft, nontender, no hepatosplenomegaly, no masses and bowel sounds normal  MS: no gross musculoskeletal " defects noted, no edema - left shoulder pain left internal rotation, abduction, especially overhaed  SKIN: no suspicious lesions or rashes  NEURO: Normal strength and tone, mentation intact and speech normal  PSYCH: mentation appears normal, affect normal/bright    Xrays/MRI pending, labs reviewed

## 2022-08-04 ENCOUNTER — LAB REQUISITION (OUTPATIENT)
Dept: LAB | Facility: CLINIC | Age: 67
End: 2022-08-04

## 2022-08-04 DIAGNOSIS — Z01.419 ENCOUNTER FOR GYNECOLOGICAL EXAMINATION (GENERAL) (ROUTINE) WITHOUT ABNORMAL FINDINGS: ICD-10-CM

## 2022-08-04 PROCEDURE — G0123 SCREEN CERV/VAG THIN LAYER: HCPCS | Performed by: OBSTETRICS & GYNECOLOGY

## 2022-08-04 PROCEDURE — 87624 HPV HI-RISK TYP POOLED RSLT: CPT | Performed by: OBSTETRICS & GYNECOLOGY

## 2022-08-09 LAB
BKR LAB AP GYN ADEQUACY: NORMAL
BKR LAB AP GYN INTERPRETATION: NORMAL
BKR LAB AP HPV REFLEX: NORMAL
BKR LAB AP LMP: NORMAL
BKR LAB AP PREVIOUS ABNL DX: NORMAL
BKR LAB AP PREVIOUS ABNORMAL: NORMAL
PATH REPORT.COMMENTS IMP SPEC: NORMAL
PATH REPORT.COMMENTS IMP SPEC: NORMAL
PATH REPORT.RELEVANT HX SPEC: NORMAL

## 2022-08-12 ENCOUNTER — HOSPITAL ENCOUNTER (OUTPATIENT)
Dept: MRI IMAGING | Facility: CLINIC | Age: 67
Discharge: HOME | End: 2022-08-12
Attending: FAMILY MEDICINE | Admitting: FAMILY MEDICINE
Payer: COMMERCIAL

## 2022-08-12 DIAGNOSIS — M25.512 CHRONIC LEFT SHOULDER PAIN: ICD-10-CM

## 2022-08-12 DIAGNOSIS — G89.29 CHRONIC LEFT SHOULDER PAIN: ICD-10-CM

## 2022-08-12 LAB
HUMAN PAPILLOMA VIRUS 16 DNA: NEGATIVE
HUMAN PAPILLOMA VIRUS 18 DNA: NEGATIVE
HUMAN PAPILLOMA VIRUS FINAL DIAGNOSIS: NORMAL
HUMAN PAPILLOMA VIRUS OTHER HR: NEGATIVE

## 2022-08-12 PROCEDURE — 73221 MRI JOINT UPR EXTREM W/O DYE: CPT | Mod: 26 | Performed by: RADIOLOGY

## 2022-08-12 PROCEDURE — 73221 MRI JOINT UPR EXTREM W/O DYE: CPT | Mod: LT

## 2022-08-16 ENCOUNTER — TRANSFERRED RECORDS (OUTPATIENT)
Dept: HEALTH INFORMATION MANAGEMENT | Facility: CLINIC | Age: 67
End: 2022-08-16

## 2022-08-16 ENCOUNTER — MYC MEDICAL ADVICE (OUTPATIENT)
Dept: FAMILY MEDICINE | Facility: CLINIC | Age: 67
End: 2022-08-16

## 2022-08-16 DIAGNOSIS — M25.512 CHRONIC LEFT SHOULDER PAIN: Primary | ICD-10-CM

## 2022-08-16 DIAGNOSIS — S43.439A LABRAL TEAR OF SHOULDER: ICD-10-CM

## 2022-08-16 DIAGNOSIS — G89.29 CHRONIC LEFT SHOULDER PAIN: Primary | ICD-10-CM

## 2022-08-16 NOTE — TELEPHONE ENCOUNTER
Orders per result note.     Tristin ABREU RN   [facility] [place] T[facility]

## 2022-08-31 ENCOUNTER — TRANSFERRED RECORDS (OUTPATIENT)
Dept: HEALTH INFORMATION MANAGEMENT | Facility: CLINIC | Age: 67
End: 2022-08-31

## 2022-10-04 PROBLEM — E53.8 VITAMIN B12 DEFICIENCY (NON ANEMIC): Status: ACTIVE | Noted: 2022-10-04

## 2022-10-10 ENCOUNTER — HEALTH MAINTENANCE LETTER (OUTPATIENT)
Age: 67
End: 2022-10-10

## 2022-10-10 ENCOUNTER — OFFICE VISIT (OUTPATIENT)
Dept: FAMILY MEDICINE | Facility: CLINIC | Age: 67
End: 2022-10-10
Payer: COMMERCIAL

## 2022-10-10 VITALS
DIASTOLIC BLOOD PRESSURE: 68 MMHG | OXYGEN SATURATION: 100 % | BODY MASS INDEX: 20.62 KG/M2 | RESPIRATION RATE: 16 BRPM | SYSTOLIC BLOOD PRESSURE: 104 MMHG | WEIGHT: 123.8 LBS | HEIGHT: 65 IN | TEMPERATURE: 97.8 F | HEART RATE: 91 BPM

## 2022-10-10 DIAGNOSIS — Z12.11 SCREEN FOR COLON CANCER: ICD-10-CM

## 2022-10-10 DIAGNOSIS — K21.00 GASTROESOPHAGEAL REFLUX DISEASE WITH ESOPHAGITIS WITHOUT HEMORRHAGE: ICD-10-CM

## 2022-10-10 DIAGNOSIS — R05.3 CHRONIC COUGH: ICD-10-CM

## 2022-10-10 DIAGNOSIS — Z13.820 SCREENING FOR OSTEOPOROSIS: ICD-10-CM

## 2022-10-10 DIAGNOSIS — K21.9 LPRD (LARYNGOPHARYNGEAL REFLUX DISEASE): ICD-10-CM

## 2022-10-10 DIAGNOSIS — K82.8 BILIARY DYSKINESIA: ICD-10-CM

## 2022-10-10 DIAGNOSIS — Z00.00 ROUTINE GENERAL MEDICAL EXAMINATION AT A HEALTH CARE FACILITY: Primary | ICD-10-CM

## 2022-10-10 DIAGNOSIS — R68.81 EARLY SATIETY: ICD-10-CM

## 2022-10-10 DIAGNOSIS — Z23 NEED FOR PNEUMOCOCCAL VACCINATION: ICD-10-CM

## 2022-10-10 DIAGNOSIS — E53.8 VITAMIN B12 DEFICIENCY (NON ANEMIC): ICD-10-CM

## 2022-10-10 DIAGNOSIS — Z12.31 ENCOUNTER FOR SCREENING MAMMOGRAM FOR MALIGNANT NEOPLASM OF BREAST: ICD-10-CM

## 2022-10-10 DIAGNOSIS — R63.4 WEIGHT LOSS: ICD-10-CM

## 2022-10-10 DIAGNOSIS — Z51.81 MEDICATION MONITORING ENCOUNTER: ICD-10-CM

## 2022-10-10 DIAGNOSIS — K31.84 GASTROPARESIS: ICD-10-CM

## 2022-10-10 DIAGNOSIS — Z13.6 CARDIOVASCULAR SCREENING; LDL GOAL LESS THAN 130: ICD-10-CM

## 2022-10-10 LAB
ALBUMIN SERPL-MCNC: 3.9 G/DL (ref 3.4–5)
ALBUMIN UR-MCNC: NEGATIVE MG/DL
ALP SERPL-CCNC: 111 U/L (ref 40–150)
ALT SERPL W P-5'-P-CCNC: 20 U/L (ref 0–50)
ANION GAP SERPL CALCULATED.3IONS-SCNC: 7 MMOL/L (ref 3–14)
APPEARANCE UR: CLEAR
AST SERPL W P-5'-P-CCNC: 21 U/L (ref 0–45)
BACTERIA #/AREA URNS HPF: ABNORMAL /HPF
BILIRUB SERPL-MCNC: 0.9 MG/DL (ref 0.2–1.3)
BILIRUB UR QL STRIP: NEGATIVE
BUN SERPL-MCNC: 17 MG/DL (ref 7–30)
CALCIUM SERPL-MCNC: 9.4 MG/DL (ref 8.5–10.1)
CHLORIDE BLD-SCNC: 107 MMOL/L (ref 94–109)
CHOLEST SERPL-MCNC: 195 MG/DL
CK SERPL-CCNC: 85 U/L (ref 30–225)
CO2 SERPL-SCNC: 26 MMOL/L (ref 20–32)
COLOR UR AUTO: YELLOW
CREAT SERPL-MCNC: 0.78 MG/DL (ref 0.52–1.04)
CREAT UR-MCNC: 19 MG/DL
ERYTHROCYTE [DISTWIDTH] IN BLOOD BY AUTOMATED COUNT: 12.8 % (ref 10–15)
FASTING STATUS PATIENT QL REPORTED: YES
GFR SERPL CREATININE-BSD FRML MDRD: 83 ML/MIN/1.73M2
GLUCOSE BLD-MCNC: 95 MG/DL (ref 70–99)
GLUCOSE UR STRIP-MCNC: NEGATIVE MG/DL
HCT VFR BLD AUTO: 39.3 % (ref 35–47)
HDLC SERPL-MCNC: 68 MG/DL
HGB BLD-MCNC: 13.8 G/DL (ref 11.7–15.7)
HGB UR QL STRIP: ABNORMAL
KETONES UR STRIP-MCNC: NEGATIVE MG/DL
LDLC SERPL CALC-MCNC: 108 MG/DL
LEUKOCYTE ESTERASE UR QL STRIP: ABNORMAL
MCH RBC QN AUTO: 31 PG (ref 26.5–33)
MCHC RBC AUTO-ENTMCNC: 35.1 G/DL (ref 31.5–36.5)
MCV RBC AUTO: 88 FL (ref 78–100)
MICROALBUMIN UR-MCNC: <5 MG/L
MICROALBUMIN/CREAT UR: NORMAL MG/G{CREAT}
NITRATE UR QL: NEGATIVE
NONHDLC SERPL-MCNC: 127 MG/DL
PH UR STRIP: 7 [PH] (ref 5–7)
PLATELET # BLD AUTO: 204 10E3/UL (ref 150–450)
POTASSIUM BLD-SCNC: 4.5 MMOL/L (ref 3.4–5.3)
PROT SERPL-MCNC: 6.7 G/DL (ref 6.8–8.8)
RBC # BLD AUTO: 4.45 10E6/UL (ref 3.8–5.2)
RBC #/AREA URNS AUTO: ABNORMAL /HPF
SODIUM SERPL-SCNC: 140 MMOL/L (ref 133–144)
SP GR UR STRIP: 1.01 (ref 1–1.03)
SQUAMOUS #/AREA URNS AUTO: ABNORMAL /LPF
TRIGL SERPL-MCNC: 97 MG/DL
TSH SERPL DL<=0.005 MIU/L-ACNC: 0.83 MU/L (ref 0.4–4)
UROBILINOGEN UR STRIP-ACNC: 0.2 E.U./DL
VIT B12 SERPL-MCNC: 358 PG/ML (ref 232–1245)
WBC # BLD AUTO: 3.5 10E3/UL (ref 4–11)
WBC #/AREA URNS AUTO: ABNORMAL /HPF

## 2022-10-10 PROCEDURE — 90732 PPSV23 VACC 2 YRS+ SUBQ/IM: CPT | Performed by: FAMILY MEDICINE

## 2022-10-10 PROCEDURE — 81001 URINALYSIS AUTO W/SCOPE: CPT | Performed by: FAMILY MEDICINE

## 2022-10-10 PROCEDURE — 82550 ASSAY OF CK (CPK): CPT | Performed by: FAMILY MEDICINE

## 2022-10-10 PROCEDURE — 90471 IMMUNIZATION ADMIN: CPT | Performed by: FAMILY MEDICINE

## 2022-10-10 PROCEDURE — 84443 ASSAY THYROID STIM HORMONE: CPT | Performed by: FAMILY MEDICINE

## 2022-10-10 PROCEDURE — 36415 COLL VENOUS BLD VENIPUNCTURE: CPT | Performed by: FAMILY MEDICINE

## 2022-10-10 PROCEDURE — 82607 VITAMIN B-12: CPT | Performed by: FAMILY MEDICINE

## 2022-10-10 PROCEDURE — 82043 UR ALBUMIN QUANTITATIVE: CPT | Performed by: FAMILY MEDICINE

## 2022-10-10 PROCEDURE — 85027 COMPLETE CBC AUTOMATED: CPT | Performed by: FAMILY MEDICINE

## 2022-10-10 PROCEDURE — 80061 LIPID PANEL: CPT | Performed by: FAMILY MEDICINE

## 2022-10-10 PROCEDURE — 99397 PER PM REEVAL EST PAT 65+ YR: CPT | Mod: 25 | Performed by: FAMILY MEDICINE

## 2022-10-10 PROCEDURE — 80053 COMPREHEN METABOLIC PANEL: CPT | Performed by: FAMILY MEDICINE

## 2022-10-10 RX ORDER — MIRTAZAPINE 15 MG/1
15 TABLET, FILM COATED ORAL AT BEDTIME
Qty: 90 TABLET | Refills: 3 | Status: SHIPPED | OUTPATIENT
Start: 2022-10-10 | End: 2023-11-22

## 2022-10-10 RX ORDER — OMEPRAZOLE 40 MG/1
40 CAPSULE, DELAYED RELEASE ORAL DAILY
Qty: 180 CAPSULE | Refills: 3 | Status: SHIPPED | OUTPATIENT
Start: 2022-10-10 | End: 2024-09-30

## 2022-10-10 ASSESSMENT — ENCOUNTER SYMPTOMS
HEARTBURN: 0
FREQUENCY: 0
HEMATOCHEZIA: 0
CONSTIPATION: 0
NAUSEA: 0
FEVER: 0
DYSURIA: 0
CHILLS: 0
HEADACHES: 0
DIARRHEA: 0
ARTHRALGIAS: 0
HEMATURIA: 0
MYALGIAS: 0
EYE PAIN: 0
ABDOMINAL PAIN: 0
WEAKNESS: 0
BREAST MASS: 0
SORE THROAT: 0
PARESTHESIAS: 0
COUGH: 0
SHORTNESS OF BREATH: 0
PALPITATIONS: 0
NERVOUS/ANXIOUS: 0
DIZZINESS: 0
JOINT SWELLING: 0

## 2022-10-10 ASSESSMENT — ACTIVITIES OF DAILY LIVING (ADL): CURRENT_FUNCTION: NO ASSISTANCE NEEDED

## 2022-10-10 NOTE — LETTER
[facility]  [address] [place] [address]  [phone]                    October 17, 2022    Denia Dailey  [address] [address]      Dear Denia,    Here is a summary of your recent test results:    Labs are overall quite good/stable     We advise:     Continue current cares.   Balanced low cholesterol diet.   Regular exercise.     For additional lab test information, [email or web address] is an excellent reference.      Your test results are enclosed.      Please contact me if you have any questions.    In addition, here is a list of due or overdue Health Maintenance reminders.    Health Maintenance Due   Topic Date Due     Osteoporosis Screening  06/27/2022     Flu Vaccine (1) 09/01/2022     COVID-19 Vaccine (5 - Booster for Pfizer series) 09/14/2022     LUNG CANCER SCREENING  09/15/2022       Please call us at [phone] (or use "[employer] to address the above recommendations.            Thank you very much for trusting [facility].     Healthy regards,          Pa Montana M.D.        Results for orders placed or performed in visit on 10/10/22   Comprehensive metabolic panel     Status: Abnormal   Result Value Ref Range    Sodium 140 133 - 144 mmol/L    Potassium 4.5 3.4 - 5.3 mmol/L    Chloride 107 94 - 109 mmol/L    Carbon Dioxide (CO2) 26 20 - 32 mmol/L    Anion Gap 7 3 - 14 mmol/L    Urea Nitrogen 17 7 - 30 mg/dL    Creatinine 0.78 0.52 - 1.04 mg/dL    Calcium 9.4 8.5 - 10.1 mg/dL    Glucose 95 70 - 99 mg/dL    Alkaline Phosphatase 111 40 - 150 U/L    AST 21 0 - 45 U/L    ALT 20 0 - 50 U/L    Protein Total 6.7 (L) 6.8 - 8.8 g/dL    Albumin 3.9 3.4 - 5.0 g/dL    Bilirubin Total 0.9 0.2 - 1.3 mg/dL    GFR Estimate 83 >60 mL/min/1.73m2   Lipid panel reflex to direct LDL Fasting     Status: Abnormal   Result Value Ref Range    Cholesterol 195 <200 mg/dL    Triglycerides 97 <150 mg/dL    Direct Measure HDL 68 >=50 mg/dL    LDL  Cholesterol Calculated 108 (H) <=100 mg/dL    Non HDL Cholesterol 127 <130 mg/dL    Patient Fasting > 8hrs? Yes     Narrative    Cholesterol  Desirable:  <200 mg/dL    Triglycerides  Normal:  Less than 150 mg/dL  Borderline High:  150-199 mg/dL  High:  200-499 mg/dL  Very High:  Greater than or equal to 500 mg/dL    Direct Measure HDL  Female:  Greater than or equal to 50 mg/dL   Male:  Greater than or equal to 40 mg/dL    LDL Cholesterol  Desirable:  <100mg/dL  Above Desirable:  100-129 mg/dL   Borderline High:  130-159 mg/dL   High:  160-189 mg/dL   Very High:  >= 190 mg/dL    Non HDL Cholesterol  Desirable:  130 mg/dL  Above Desirable:  130-159 mg/dL  Borderline High:  160-189 mg/dL  High:  190-219 mg/dL  Very High:  Greater than or equal to 220 mg/dL   CBC with platelets     Status: Abnormal   Result Value Ref Range    WBC Count 3.5 (L) 4.0 - 11.0 10e3/uL    RBC Count 4.45 3.80 - 5.20 10e6/uL    Hemoglobin 13.8 11.7 - 15.7 g/dL    Hematocrit 39.3 35.0 - 47.0 %    MCV 88 78 - 100 fL    MCH 31.0 26.5 - 33.0 pg    MCHC 35.1 31.5 - 36.5 g/dL    RDW 12.8 10.0 - 15.0 %    Platelet Count 204 150 - 450 10e3/uL   CK total     Status: Normal   Result Value Ref Range    CK 85 30 - 225 U/L   UA reflex to Microscopic and Culture     Status: Abnormal    Specimen: Urine, Midstream   Result Value Ref Range    Color Urine Yellow Colorless, Straw, Light Yellow, Yellow    Appearance Urine Clear Clear    Glucose Urine Negative Negative mg/dL    Bilirubin Urine Negative Negative    Ketones Urine Negative Negative mg/dL    Specific Gravity Urine 1.015 1.003 - 1.035    Blood Urine Trace (A) Negative    pH Urine 7.0 5.0 - 7.0    Protein Albumin Urine Negative Negative mg/dL    Urobilinogen Urine 0.2 0.2, 1.0 E.U./dL    Nitrite Urine Negative Negative    Leukocyte Esterase Urine Trace (A) Negative   Albumin Random Urine Quantitative with Creat Ratio     Status: None   Result Value Ref Range    Creatinine Urine mg/dL 19 mg/dL    Albumin  Urine mg/L <5 mg/L    Albumin Urine mg/g Cr     TSH with free T4 reflex     Status: Normal   Result Value Ref Range    TSH 0.83 0.40 - 4.00 mU/L   Vitamin B12     Status: Normal   Result Value Ref Range    Vitamin B12 358 232 - 1,245 pg/mL   Urine Microscopic     Status: Abnormal   Result Value Ref Range    Bacteria Urine Few (A) None Seen /HPF    RBC Urine None Seen 0-2 /HPF /HPF    WBC Urine 0-5 0-5 /HPF /HPF    Squamous Epithelials Urine Few (A) None Seen /LPF    Narrative    Urine Culture not indicated

## 2022-10-10 NOTE — PROGRESS NOTES
"[facility]  [place]    SUBJECTIVE    CC: Denia is an 67 year old who presents for preventive health visit.     Patient has been advised of split billing requirements and indicates understanding: Yes     Healthy Habits:     In general, how would you rate your overall health?  Good    Frequency of exercise:  4-5 days/week    Duration of exercise:  30-45 minutes    Do you usually eat at least 4 servings of fruit and vegetables a day, include whole grains    & fiber and avoid regularly eating high fat or \"junk\" foods?  No    Taking medications regularly:  Yes    Medication side effects:  Not applicable    Ability to successfully perform activities of daily living:  No assistance needed    Home Safety:  No safety concerns identified    Hearing Impairment:  Difficulty following a conversation in a noisy restaurant or crowded room    In the past 6 months, have you been bothered by leaking of urine?  No    In general, how would you rate your overall mental or emotional health?  Excellent      PHQ-2 Total Score: 0    Additional concerns today:  No    Today's PHQ-2 Score:     PHQ-2 ( 1999 Pfizer) 10/10/2022   Q1: Little interest or pleasure in doing things 0   Q2: Feeling down, depressed or hopeless 0   PHQ-2 Score 0   PHQ-2 Total Score (12-17 Years)- Positive if 3 or more points; Administer PHQ-A if positive -   Q1: Little interest or pleasure in doing things Not at all   Q2: Feeling down, depressed or hopeless Not at all   PHQ-2 Score 0     Abuse: Current or Past (Physical, Sexual or Emotional) - No  Do you feel safe in your environment? Yes    Social History     Tobacco Use     Smoking status: Former     Packs/day: 0.00     Years: 0.00     Pack years: 0.00     Types: Cigarettes     Smokeless tobacco: Never     Tobacco comments:     Light - quit at age 29   Substance Use Topics     Alcohol use: Not Currently     Alcohol/week: 0.0 - 1.0 standard drinks     Comment: Social 1-2 glasses of wine/month     If you drink " alcohol do you typically have >3 drinks per day or >7 drinks per week? No    Alcohol Use 10/10/2022   Prescreen: >3 drinks/day or >7 drinks/week? No   Prescreen: >3 drinks/day or >7 drinks/week? -   No flowsheet data found.    Reviewed orders with patient.  Reviewed health maintenance and updated orders accordingly - Yes    Breast Cancer Screening:    FHS-7:   Breast CA Risk Assessment (FHS-7) 7/21/2021 7/21/2021 7/27/2022   Did any of your first-degree relatives have breast or ovarian cancer? No - Yes   Did any of your relatives have bilateral breast cancer? No - No   Did any man in your family have breast cancer? No - No   Did any woman in your family have breast and ovarian cancer? No - No   Did any woman in your family have breast cancer before age 50 y? No - No   Do you have 2 or more relatives with breast and/or ovarian cancer? No Yes Yes   Do you have 2 or more relatives with breast and/or bowel cancer? No - No     Mom, MA, MGGM - Breast cancer    Mammogram Screening: Recommended annual mammography  Pertinent mammograms are reviewed under the imaging tab.    History of abnormal Pap smear:   NO - age 65 - see link     Cervical Cytology Screening Guidelines    PAP / HPV Latest Ref Rng & Units 8/4/2022 4/10/2013   PAP   Negative for Intraepithelial Lesion or Malignancy (NILM) -   PAP (Historical) - - Neg   HPV16 Negative Negative -   HPV18 Negative Negative -   HRHPV Negative Negative -     Reviewed and updated as needed this visit by clinical staff   Tobacco  Allergies  Meds  Problems  Med Hx  Surg Hx  Fam Hx        Reviewed and updated as needed this visit by Provider                 Review of Systems   Constitutional: Negative for chills and fever.   HENT: Negative for congestion, ear pain, hearing loss and sore throat.    Eyes: Negative for pain and visual disturbance.   Respiratory: Negative for cough and shortness of breath.    Cardiovascular: Negative for chest pain, palpitations and peripheral  edema.   Gastrointestinal: Negative for abdominal pain, constipation, diarrhea, heartburn, hematochezia and nausea.   Breasts:  Negative for tenderness, breast mass and discharge.   Genitourinary: Negative for dysuria, frequency, genital sores, hematuria, pelvic pain, urgency, vaginal bleeding and vaginal discharge.   Musculoskeletal: Negative for arthralgias, joint swelling and myalgias.   Skin: Negative for rash.   Neurological: Negative for dizziness, weakness, headaches and paresthesias.   Psychiatric/Behavioral: Negative for mood changes. The patient is not nervous/anxious.      GERD - Gastroparesis - chronic cough - LPRD - weight issued - on remeron - stable/improved    Lipids    Recent Labs   Lab Test 05/24/21  0937   CHOL 202*   HDL 60   *   TRIG 127     Dr Stuart - hematology - B12 monthly    Nephrolithiasis - no issues    ECHO spring 2023 - Mitral regurg    Health Maintenance     Colonoscopy:  Due 9/2024   FIT:  given              Mammogram:  negative              PAP:  NA - Dr Kirk   DEXA:  pending    Health Maintenance Due   Topic Date Due     DEXA  06/27/2022     INFLUENZA VACCINE (1) 09/01/2022     COVID-19 Vaccine (5 - Booster for Pfizer series) 09/14/2022     LUNG CANCER SCREENING  09/15/2022       Current Problem List    Patient Active Problem List   Diagnosis     Thoracic or lumbosacral neuritis or radiculitis, unspecified     Lumbago     CARDIOVASCULAR SCREENING; LDL GOAL LESS THAN 130     Advanced directives, counseling/discussion     GERD (gastroesophageal reflux disease)     Chronic cough     Gastroparesis     LPRD (laryngopharyngeal reflux disease)     Mitral regurgitation     Vitamin B12 deficiency (non anemic)       Past Medical History    Past Medical History:   Diagnosis Date     C. difficile colitis      CARDIOVASCULAR SCREENING; LDL GOAL LESS THAN 130      Chronic cough      Gastroparesis 2019    mild/moderate - [facility]     GERD (gastroesophageal reflux disease)      Heart disease  06/2021     Helicobacter pylori (H. pylori) infection      LPRD (laryngopharyngeal reflux disease)     Dr Hernandez - cough     Mitral regurgitation     moderate to severe, mild MVP, Dr Dixon     Nephrolithiasis 05/2021    bilateral, small     Right leg numbness     Residual from lumbosacral neuritis     Thoracic or lumbosacral neuritis or radiculitis, unspecified 05/02/2007    Disc Herniation at L4-5, L5-S1     Unintentional weight loss 2021     Vitamin B12 deficiency (non anemic) 2021    Dr Stuart       Past Surgical History    Past Surgical History:   Procedure Laterality Date     BUNIONECTOMY  2009     COLONOSCOPY  7/09, 9/19    Diverticulosis, Ct Colonography, due 5 yrs     DAVINCI LAPAROSCOPIC CHOLECYSTECTOMY WITH GRAMS N/A 09/17/2021    Procedure: CHOLECYSTECTOMY, ROBOT-ASSISTED, LAPAROSCOPIC, WITH CHOLANGIOGRAM;  Surgeon: Chante Willis MD;  Location:  OR     ESOPHAGOSCOPY, GASTROSCOPY, DUODENOSCOPY (EGD), COMBINED N/A 03/15/2016    chronic gastritis     ESOPHAGOSCOPY, GASTROSCOPY, DUODENOSCOPY (EGD), COMBINED N/A 09/14/2021    Procedure: ESOPHAGOGASTRODUODENOSCOPY, WITH BIOPSies using biopsy forceps;  Surgeon: Tomasz Andres MD;  Location: [facility]     Fractured jaw  1966     ORTHOPEDIC SURGERY Right 2009    bunnionectomy     UPPER GI ENDOSCOPY  03/2016    Erythematous mucosa in the greater curvature.      [facility] CLOSED RX NOSE/JAW FRAC+WIRES  1966     [facility] DISKECTOMY,PERCUTANEOUS LUMBAR  07/10/2007    L5-S1 with nerve root decompression       Current Medications    Current Outpatient Medications   Medication Sig Dispense Refill     CALCIUM + D 600-200 MG-UNIT OR TABS Take 2 tablets by mouth 2 times daily       Cholecalciferol (VITAMIN D3 PO) Take by mouth daily       cyanocobalamin (CYANOCOBALAMIN) 1000 MCG/ML injection Inject 1 mL into the muscle every 30 days       diclofenac (VOLTAREN) 1 % topical gel 1 g       famotidine (PEPCID) 10 MG tablet Take 10 mg by mouth 2 times daily OTC PRN       magnesium 250  MG tablet Take 1 tablet by mouth daily. 100 tablet 3     mirtazapine (REMERON) 15 MG tablet Take 1 tablet (15 mg) by mouth At Bedtime 90 tablet 3     omeprazole (PRILOSEC) 40 MG DR capsule Take 1 capsule (40 mg) by mouth daily 180 capsule 3       Allergies    Allergies   Allergen Reactions     Vioxx      stomach upset     Nickel Rash     Accompanied by itching and swelling       Immunizations    Immunization History   Administered Date(s) Administered     COVID-19,PF,Pfizer (12+ Yrs) 12/29/2020, 01/15/2021, 10/12/2021     COVID-19,PF,Pfizer 12+ Yrs (2022 and After) 07/20/2022     HepB-Adult 01/01/1991, 02/01/1992, 06/01/1992     Influenza (High Dose) 3 valent vaccine 10/25/2021     Influenza (IIV3) PF 10/01/2013     Influenza Intranasal Vaccine 4 valent (FluMist) 10/25/2017, 10/15/2020     Influenza Vaccine IM > 6 months Valent IIV4 (Alfuria,Fluzone) 10/13/2019     Pneumo Conj 13-V (2010&after) 05/24/2021     Pneumococcal 23 valent 10/10/2022     TDAP Vaccine (Adacel) 02/19/2018     Tetanus 03/01/2006     Zoster vaccine recombinant adjuvanted (SHINGRIX) 05/24/2021, 08/30/2021       Family History    Family History   Problem Relation Age of Onset     Breast Cancer Mother      Cancer Father         renal cell CA     Coronary Artery Disease Maternal Grandmother      Coronary Artery Disease Paternal Grandmother      Breast Cancer Maternal Aunt 70     Coronary Artery Disease Paternal Uncle      Ovarian Cancer No family hx of        Social History    Social History     Socioeconomic History     Marital status:      Spouse name: Haroldo     Number of children: 0     Years of education: 16     Highest education level: Not on file   Occupational History     Occupation: NURSE     Employer: [employer] [address]   Tobacco Use     Smoking status: Former     Packs/day: 0.00     Years: 0.00     Pack years: 0.00     Types: Cigarettes     Smokeless tobacco: Never     Tobacco comments:     Light - quit at  age 29   Vaping Use     Vaping Use: Never used   Substance and Sexual Activity     Alcohol use: Not Currently     Alcohol/week: 0.0 - 1.0 standard drinks     Comment: Social 1-2 glasses of wine/month     Drug use: No     Sexual activity: Never   Other Topics Concern      Service Not Asked     Blood Transfusions Not Asked     Caffeine Concern Not Asked     Occupational Exposure Not Asked     Hobby Hazards Not Asked     Sleep Concern Not Asked     Stress Concern Not Asked     Weight Concern Not Asked     Special Diet Not Asked     Back Care Not Asked     Exercise Not Asked     Bike Helmet Not Asked     Seat Belt Not Asked     Self-Exams Not Asked     Parent/sibling w/ CABG, MI or angioplasty before 65F 55M? No   Social History Narrative     Not on file     Social Determinants of Health     Financial Resource Strain: Not on file   Food Insecurity: Not on file   Transportation Needs: Not on file   Physical Activity: Not on file   Stress: Not on file   Social Connections: Not on file   Intimate Partner Violence: Not on file   Housing Stability: Not on file       ROS    CONSTITUTIONAL: NEGATIVE for fever, chills, change in weight  INTEGUMENTARY/SKIN: NEGATIVE for worrisome rashes, moles or lesions  EYES: NEGATIVE for vision changes or irritation  ENT/MOUTH: NEGATIVE for ear, mouth and throat problems  RESP: NEGATIVE for significant cough or SOB  BREAST: NEGATIVE for masses, tenderness or discharge  CV: NEGATIVE for chest pain, palpitations or peripheral edema  GI: NEGATIVE for nausea, abdominal pain, heartburn, or change in bowel habits  : NEGATIVE for frequency, dysuria, or hematuria  MUSCULOSKELETAL: NEGATIVE for significant arthralgias or myalgia  NEURO: NEGATIVE for weakness, dizziness or paresthesias  ENDOCRINE: NEGATIVE for temperature intolerance, skin/hair changes  HEME: NEGATIVE for bleeding problems  PSYCHIATRIC: NEGATIVE for changes in mood or affect    OBJECTIVE    /68   Pulse 91   Temp 97.8  " F (36.6  C) (Tympanic)   Resp 16   Ht 1.651 m (5' 5\")   Wt 56.2 kg (123 lb 12.8 oz)   LMP 08/08/2009   SpO2 100%   BMI 20.60 kg/m    EXAM:  GENERAL: healthy, alert and no distress  EYES: Eyes grossly normal to inspection, PERRL and conjunctivae and sclerae normal  HENT: ear canals and TM's normal, nose and mouth without ulcers or lesions  NECK: no adenopathy, no asymmetry, masses, or scars and thyroid normal to palpation  RESP: lungs clear to auscultation - no rales, rhonchi or wheezes  BREAST: per Dr Kirk  CV: regular rate and rhythm, normal S1 S2, no S3 or S4, no murmur, click or rub, no peripheral edema and peripheral pulses strong  ABDOMEN: soft, nontender, no hepatosplenomegaly, no masses and bowel sounds normal   (female): per Dr Kirk  MS: no gross musculoskeletal defects noted, no edema  SKIN: no suspicious lesions or rashes  NEURO: Normal strength and tone, mentation intact and speech normal  PSYCH: mentation appears normal, affect normal/bright  LYMPH: no cervical, supraclavicular, axillary, or inguinal adenopathy    DIAGNOSTICS/PROCEDURES    Pending    ASSESSMENT      ICD-10-CM    1. Routine general medical examination at a health care facility  Z00.00 Comprehensive metabolic panel     Lipid panel reflex to direct LDL Fasting     CBC with platelets     CK total     UA reflex to Microscopic and Culture     Albumin Random Urine Quantitative with Creat Ratio     TSH with free T4 reflex     Fecal colorectal cancer screen FIT     Fecal colorectal cancer screen FIT     Comprehensive metabolic panel     Lipid panel reflex to direct LDL Fasting     CBC with platelets     CK total     UA reflex to Microscopic and Culture     Albumin Random Urine Quantitative with Creat Ratio     TSH with free T4 reflex     Vitamin B12     Urine Microscopic     CANCELED: Vitamin B12      2. Chronic cough  R05.3 Comprehensive metabolic panel     CBC with platelets     Comprehensive metabolic panel     CBC with platelets "     OFFICE/OUTPT VISIT,EST,LEVL IV      3. Gastroesophageal reflux disease with esophagitis without hemorrhage  K21.00 Comprehensive metabolic panel     CBC with platelets     Comprehensive metabolic panel     CBC with platelets     omeprazole (PRILOSEC) 40 MG DR capsule     OFFICE/OUTPT VISIT,EST,LEVL IV      4. LPRD (laryngopharyngeal reflux disease)  K21.9 Comprehensive metabolic panel     CBC with platelets     Comprehensive metabolic panel     CBC with platelets     OFFICE/OUTPT VISIT,EST,LEVL IV      5. Gastroparesis  K31.84 Comprehensive metabolic panel     CBC with platelets     TSH with free T4 reflex     Comprehensive metabolic panel     CBC with platelets     TSH with free T4 reflex     omeprazole (PRILOSEC) 40 MG DR capsule     OFFICE/OUTPT VISIT,EST,LEVL IV      6. Weight loss  R63.4 Comprehensive metabolic panel     CBC with platelets     TSH with free T4 reflex     Comprehensive metabolic panel     CBC with platelets     TSH with free T4 reflex     omeprazole (PRILOSEC) 40 MG DR capsule     OFFICE/OUTPT VISIT,EST,LEVL IV      7. Early satiety  R68.81 Comprehensive metabolic panel     CBC with platelets     TSH with free T4 reflex     Comprehensive metabolic panel     CBC with platelets     TSH with free T4 reflex     omeprazole (PRILOSEC) 40 MG DR capsule     OFFICE/OUTPT VISIT,EST,LEVL IV      8. Biliary dyskinesia  K82.8 mirtazapine (REMERON) 15 MG tablet     OFFICE/OUTPT VISIT,EST,LEVL IV      9. Vitamin B12 deficiency (non anemic)  E53.8 Vitamin B12     OFFICE/OUTPT VISIT,EST,LEVL IV     CANCELED: Vitamin B12      10. CARDIOVASCULAR SCREENING; LDL GOAL LESS THAN 130  Z13.6 Comprehensive metabolic panel     Lipid panel reflex to direct LDL Fasting     Comprehensive metabolic panel     Lipid panel reflex to direct LDL Fasting     OFFICE/OUTPT VISIT,EST,LEVL IV      11. Screen for colon cancer  Z12.11 Fecal colorectal cancer screen FIT     Fecal colorectal cancer screen FIT     OFFICE/OUTPT  VISIT,EST,LEVL IV      12. Encounter for screening mammogram for malignant neoplasm of breast  Z12.31 OFFICE/OUTPT VISIT,EST,LEVL IV      13. Screening for osteoporosis  Z13.820 OFFICE/OUTPT VISIT,EST,LEVL IV      14. Medication monitoring encounter  Z51.81 Comprehensive metabolic panel     Lipid panel reflex to direct LDL Fasting     CBC with platelets     CK total     UA reflex to Microscopic and Culture     Albumin Random Urine Quantitative with Creat Ratio     TSH with free T4 reflex     Comprehensive metabolic panel     Lipid panel reflex to direct LDL Fasting     CBC with platelets     CK total     UA reflex to Microscopic and Culture     Albumin Random Urine Quantitative with Creat Ratio     TSH with free T4 reflex     Vitamin B12     Urine Microscopic     OFFICE/OUTPT VISIT,EST,LEVL IV     CANCELED: Vitamin B12      15. Need for pneumococcal vaccination  Z23 PPSV23, IM/SUBQ (2+ YRS) - [id number]     OFFICE/OUTPT VISIT,EST,LEVL IV          PLAN    Discussed treatment/modality options, including risk and benefits, she desires:    advised alcohol consumption 1oz per day or less, advised 1 multivitamin per day, advised calcium 7047-3995 mg/d and Vitamin D 800-1200 IU/d, advised dentist every 6 months, advised diet and exercise, advised opthalmologist every 1-2 years, advised self breast exam q month, further health care maintenance, further lab(s), medication refill(s) and observation    All diagnosis above reviewed and noted above, otherwise stable.      See [facility] orders for further details.      1) med refills    2) labs pending    3) immunizations reviewed - pneumovax    4) Dr Stuart    5) Dr Kirk    Return in about 1 year (around 10/10/2023) for Complete Physical, Medication Recheck Visit, Follow Up Chronic.    Health Maintenance Due   Topic Date Due     DEXA  06/27/2022     INFLUENZA VACCINE (1) 09/01/2022     COVID-19 Vaccine (5 - Booster for Pfizer series) 09/14/2022     LUNG CANCER SCREENING   "09/15/2022       COUNSELING    Reviewed preventive health counseling, as reflected in patient instructions    BP Readings from Last 1 Encounters:   10/10/22 104/68     Estimated body mass index is 20.6 kg/m  as calculated from the following:    Height as of this encounter: 1.651 m (5' 5\").    Weight as of this encounter: 56.2 kg (123 lb 12.8 oz).     reports that she has quit smoking. Her smoking use included cigarettes. She has never used smokeless tobacco.           Pa Montana MD, FAASt. Cloud Hospital [facility]  [address] [place] [id number]  [email or web address]   Office: [phone]  Fax: [phone]  Pager: [phone]       "

## 2023-02-01 ENCOUNTER — TRANSFERRED RECORDS (OUTPATIENT)
Dept: HEALTH INFORMATION MANAGEMENT | Facility: CLINIC | Age: 68
End: 2023-02-01
Payer: COMMERCIAL

## 2023-02-10 ENCOUNTER — TRANSFERRED RECORDS (OUTPATIENT)
Dept: HEALTH INFORMATION MANAGEMENT | Facility: CLINIC | Age: 68
End: 2023-02-10
Payer: COMMERCIAL

## 2023-02-10 PROBLEM — M81.0 OSTEOPOROSIS: Status: ACTIVE | Noted: 2023-02-10

## 2023-02-23 ENCOUNTER — VIRTUAL VISIT (OUTPATIENT)
Dept: FAMILY MEDICINE | Facility: CLINIC | Age: 68
End: 2023-02-23
Payer: COMMERCIAL

## 2023-02-23 DIAGNOSIS — Z51.81 MEDICATION MONITORING ENCOUNTER: ICD-10-CM

## 2023-02-23 DIAGNOSIS — M81.0 AGE-RELATED OSTEOPOROSIS WITHOUT CURRENT PATHOLOGICAL FRACTURE: Primary | ICD-10-CM

## 2023-02-23 DIAGNOSIS — E53.8 VITAMIN B12 DEFICIENCY (NON ANEMIC): ICD-10-CM

## 2023-02-23 PROCEDURE — 99213 OFFICE O/P EST LOW 20 MIN: CPT | Mod: 93 | Performed by: FAMILY MEDICINE

## 2023-02-23 NOTE — PROGRESS NOTES
Denia is a 67 year old who is being evaluated via a billable telephone visit.      What phone number would you like to be contacted at? [phone]  How would you like to obtain your AVS? MyChart    Distant Location (provider location):  On-site    Assessment & Plan       ICD-10-CM    1. Age-related osteoporosis without current pathological fracture  M81.0       2. Vitamin B12 deficiency (non anemic)  E53.8       3. Medication monitoring encounter  Z51.81           Discussed treatment/modality options, including risk and benefits, she desires:    1) increase calcium to 1200 mg daily, taking vitamin D 2000 international unit(s), regular weight bearing exercise (doing 3 miles 4-7 times per week)    2) reviewed options - Biphosphonates, SERM, Calcitonin, prolia    3) will consider options    4) DEXA in 2 yrs    All diagnosis above reviewed and noted above, otherwise stable.      See [employer] orders for further details.      Return in about 8 months (around 10/23/2023).    No LOS data to display    Doing chart review, history and exam, documentation and further activities as noted.           Pa Montana MD, FAAFP     [facility]  [address] [place] [address]  [email or web address]   Office: [phone]  Fax: [phone]  Pager: [phone]     Subjective   Denia is a 67 year old, presenting for the following health issues:    Follow up DEXA - Left Hip pain - mainly in am with first getting up, worse with stairs, no redness, no warmth, no swelling - hx of cortisone injection - [facility] - a few years ago    Osteopenia, except osteoporosis at Left femoral neck (-2.5)    Dr Kirk, OB/Gyn    Dr Stuart, hematology, B12 normal    CPX with myself - 10/10/2023 - reviewed    Review of Systems   CONSTITUTIONAL: NEGATIVE for fever, chills, change in weight  INTEGUMENTARY/SKIN: NEGATIVE for worrisome rashes, moles or lesions  EYES: NEGATIVE  for vision changes or irritation  ENT/MOUTH: NEGATIVE for ear, mouth and throat problems  RESP: NEGATIVE for significant cough or SOB  CV: NEGATIVE for chest pain, palpitations or peripheral edema  GI: NEGATIVE for nausea, abdominal pain, heartburn, or change in bowel habits  : NEGATIVE for frequency, dysuria, or hematuria  MUSCULOSKELETAL: NEGATIVE for significant arthralgias or myalgia  NEURO: NEGATIVE for weakness, dizziness or paresthesias  ENDOCRINE: NEGATIVE for temperature intolerance, skin/hair changes  HEME: NEGATIVE for bleeding problems  PSYCHIATRIC: NEGATIVE for changes in mood or affect      Objective           Vitals:  No vitals were obtained today due to virtual visit.    Physical Exam   healthy, alert and no distress  PSYCH: Alert and oriented times 3; coherent speech, normal   rate and volume, able to articulate logical thoughts, able   to abstract reason, no tangential thoughts, no hallucinations   or delusions  Her affect is normal  RESP: No cough, no audible wheezing, able to talk in full sentences  Remainder of exam unable to be completed due to telephone visits    Reviewed DEXA    Phone call duration: 24 minutes

## 2023-05-15 ENCOUNTER — HOSPITAL ENCOUNTER (OUTPATIENT)
Dept: CARDIOLOGY | Facility: CLINIC | Age: 68
Discharge: HOME | End: 2023-05-15
Attending: NURSE PRACTITIONER | Admitting: NURSE PRACTITIONER
Payer: COMMERCIAL

## 2023-05-15 DIAGNOSIS — I34.0 NONRHEUMATIC MITRAL VALVE REGURGITATION: ICD-10-CM

## 2023-05-15 LAB — LVEF ECHO: NORMAL

## 2023-05-15 PROCEDURE — 93306 TTE W/DOPPLER COMPLETE: CPT

## 2023-05-15 PROCEDURE — 93306 TTE W/DOPPLER COMPLETE: CPT | Mod: 26 | Performed by: INTERNAL MEDICINE

## 2023-05-19 ENCOUNTER — OFFICE VISIT (OUTPATIENT)
Dept: CARDIOLOGY | Facility: CLINIC | Age: 68
End: 2023-05-19
Payer: COMMERCIAL

## 2023-05-19 VITALS
HEART RATE: 92 BPM | WEIGHT: 122.5 LBS | BODY MASS INDEX: 20.41 KG/M2 | DIASTOLIC BLOOD PRESSURE: 74 MMHG | OXYGEN SATURATION: 98 % | SYSTOLIC BLOOD PRESSURE: 112 MMHG | HEIGHT: 65 IN

## 2023-05-19 DIAGNOSIS — I34.0 NONRHEUMATIC MITRAL VALVE REGURGITATION: ICD-10-CM

## 2023-05-19 PROCEDURE — 99214 OFFICE O/P EST MOD 30 MIN: CPT | Performed by: PHYSICIAN ASSISTANT

## 2023-05-19 RX ORDER — ALENDRONATE SODIUM 70 MG/1
70 TABLET ORAL ONCE
COMMUNITY
End: 2023-11-22

## 2023-05-19 NOTE — PROGRESS NOTES
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.  Her past medical history includes: mitral valve disease, chronic gastritis/colitis.     Denia was found to have a murmur on routine physical exam and underwent an ECHO 2021. This showed LVEF 60-65%, normal RV function, 3+ mitral regurgitation with borderline MV prolapse (ERO 0.11 cm2).      Before her cardiovascular evaluation, she was hospitalized 2021 for chest pain and was found to have biliary dyskinesia, biliary colic,with symptomatic cholelithiasis and subsequently underwent a lap cholecystectomy. A stress ECHO was performed, but imaging was poor. The EKG did not show any concern for ischemia.     Repeat ECHO 4/15/22 showed LVEF 55-60%, normal RV function, 2-3+ MR (ERO 0.20, regurg volume 34 ml) mild MV prolapse of posterior leaflet.     KAYLIN 22 showed LVEF 60-65%, RV is normal, left atrium is mildly dilated. There is moderate to mod-severe (2-3+) mitral regurgitation. Multiple jets.The mechanism apears to mix of mild posterior leaflet prolapse and degenerative valve disease.    Repeat echo 5/15/23 showed LVEF 55-60%, normal RV function, mild mitral valve prolapse, posterior leaflet. There is moderate to mod-  severe (2-3+) mitral regurgitation.     Ms Dailey is still working and feels well. She denies any cardiovascular complaints. She walks a few miles every day and denies any limitations. She specifically denies palpitations, chest pain, shortness of breath, orthopnea, or peripheral edema.           ROS:  Skin:  not assessed     Eyes:  not assessed    ENT:  not assessed    Respiratory:  Positive for cough  Cardiovascular:  Negative    Gastroenterology: Positive for heartburn;reflux  Genitourinary:  not assessed    Musculoskeletal:  not assessed    Neurologic:  not  assessed    Psychiatric:  not assessed    Heme/Lymph/Imm:  not assessed    Endocrine:  not assessed      PAST MEDICAL HISTORY:  Past Medical History:   Diagnosis Date     C. difficile colitis      CARDIOVASCULAR SCREENING; LDL GOAL LESS THAN 130      Chronic cough      Gastroparesis 2019    mild/moderate - [facility]     GERD (gastroesophageal reflux disease)      Heart disease 06/2021     Helicobacter pylori (H. pylori) infection      LPRD (laryngopharyngeal reflux disease)     Dr Hernandez - [facility]     Mitral regurgitation     moderate to severe, mild MVP, Dr Dixon     Nephrolithiasis 05/2021    bilateral, small     Osteoporosis 02/2023     Right leg numbness     Residual from lumbosacral neuritis     Thoracic or lumbosacral neuritis or radiculitis, unspecified 05/02/2007    Disc Herniation at L4-5, L5-S1     Unintentional weight loss 2021     Vitamin B12 deficiency (non anemic) 2021    Dr Stuart       PAST SURGICAL HISTORY:  Past Surgical History:   Procedure Laterality Date     BUNIONECTOMY  2009     COLONOSCOPY  7/09, 9/19    Diverticulosis, Ct Colonography, due 5 yrs     DAVINCI LAPAROSCOPIC CHOLECYSTECTOMY WITH GRAMS N/A 09/17/2021    Procedure: CHOLECYSTECTOMY, ROBOT-ASSISTED, LAPAROSCOPIC, WITH CHOLANGIOGRAM;  Surgeon: Chante Willis MD;  Location:  OR     ESOPHAGOSCOPY, GASTROSCOPY, DUODENOSCOPY (EGD), COMBINED N/A 03/15/2016    chronic gastritis     ESOPHAGOSCOPY, GASTROSCOPY, DUODENOSCOPY (EGD), COMBINED N/A 09/14/2021    Procedure: ESOPHAGOGASTRODUODENOSCOPY, WITH BIOPSies using biopsy forceps;  Surgeon: Tomasz Andres MD;  Location: [facility]     Fractured jaw  1966     ORTHOPEDIC SURGERY Right 2009    bunnionectomy     UPPER GI ENDOSCOPY  03/2016    Erythematous mucosa in the greater curvature.      ZZC CLOSED RX NOSE/JAW FRAC+WIRES  1966     Z DISKECTOMY,PERCUTANEOUS LUMBAR  07/10/2007    L5-S1 with nerve root decompression       SOCIAL HISTORY:  Social History     Socioeconomic History     Marital  status:      Spouse name: Haroldo     Number of children: 0     Years of education: 16   Occupational History     Occupation: NURSE     Employer: [employer],[address]   Tobacco Use     Smoking status: Former     Packs/day: 0.00     Years: 0.00     Pack years: 0.00     Types: Cigarettes     Smokeless tobacco: Never     Tobacco comments:     Light - quit at age 29   Vaping Use     Vaping status: Never Used   Substance and Sexual Activity     Alcohol use: Not Currently     Alcohol/week: 0.0 - 1.0 standard drinks of alcohol     Comment: Social 1-2 glasses of wine/month     Drug use: No     Sexual activity: Never   Other Topics Concern     Parent/sibling w/ CABG, MI or angioplasty before 65F 55M? No       FAMILY HISTORY:  Family History   Problem Relation Age of Onset     Breast Cancer Mother      Cancer Father         renal cell CA     Coronary Artery Disease Maternal Grandmother      Coronary Artery Disease Paternal Grandmother      Breast Cancer Maternal Aunt 70     Coronary Artery Disease Paternal Uncle      Ovarian Cancer No family hx of        MEDS: alendronate (FOSAMAX) 70 MG tablet, Take 70 mg by mouth once Pt takes once a week  CALCIUM + D 600-200 MG-UNIT OR TABS, Take 2 tablets by mouth 2 times daily  Cholecalciferol (VITAMIN D3 PO), Take by mouth daily  cyanocobalamin (CYANOCOBALAMIN) 1000 MCG/ML injection, Inject 1 mL into the muscle every 30 days  diclofenac (VOLTAREN) 1 % topical gel, 1 g  famotidine (PEPCID) 10 MG tablet, Take 10 mg by mouth 2 times daily OTC PRN  magnesium 250 MG tablet, Take 1 tablet by mouth daily.  mirtazapine (REMERON) 15 MG tablet, Take 1 tablet (15 mg) by mouth At Bedtime  omeprazole (PRILOSEC) 40 MG DR capsule, Take 1 capsule (40 mg) by mouth daily    No current facility-administered medications on file prior to visit.      ALLERGIES:   Allergies   Allergen Reactions     Vioxx      stomach upset     Nickel Rash     Accompanied by itching and swelling  "      PHYSICAL EXAM:  Vitals: /74 (BP Location: Right arm, Patient Position: Sitting, Cuff Size: Adult Regular)   Pulse 92   Ht 1.651 m (5' 5\")   Wt 55.6 kg (122 lb 8 oz)   LMP 08/08/2009   SpO2 98%   BMI 20.39 kg/m    Constitutional:  cooperative, alert and oriented, well developed, well nourished, in no acute distress thin      Skin:  warm and dry to the touch;no apparent skin lesions or masses noted        Head:  normocephalic        Eyes:  pupils equal and round;conjunctivae and lids unremarkable;sclera white;no xanthalasma        ENT:  no pallor or cyanosis, dentition good        Neck:  JVP normal        Respiratory:  clear to auscultation;normal respiratory excursion        Cardiac: regular rhythm;normal S1 and S2       holosystolic murmur;grade 2          GI:  abdomen soft;BS normoactive        Vascular: pulses full and equal                                      Extremities and Musculoskeletal:  no edema        Neurological:  affect appropriate;no gross motor deficits              LABS/DATA:  I reviewed the following:  Echo 5/15/23:  Interpretation Summary  1. The left ventricle is normal in size. There is normal left ventricular wall  thickness. Left ventricular systolic function is normal. The visual ejection  fraction is 55-60%. Diastolic Doppler findings (E/E' ratio and/or other  parameters) suggest left ventricular filling pressures are normal. No regional  wall motion abnormalities noted.  2. The right ventricle is normal size. The right ventricular systolic function  is normal.  3. The left atrium is mildly dilated.  4. Mild mitral valve prolapse, posterior leaflet. There is moderate to mod-  severe (2-3+) mitral regurgitation.  5. No pericardial effusion.  6. In comparison to the previous report dated 04/15/2022, the findings are  Similar.          ASSESSMENT/PLAN:  Moderate to moderate-severe Mitral Regurgitation  -EF 55-60%, mild  MV prolapse of posterior leaflet, moderate to " moderate-severe MR on echo 5/15/23  -asymptomatic  -repeat echo next year, sooner if sxs        Hyperlipidemia  -ASCVD 10 year risk < 7.5%, statin not recommended at this time  -LDL  108        Follow up:  1 year with echo and PRASHANTH LooneyC

## 2023-05-19 NOTE — LETTER
2023    Pa Montana MD  [address] [facility] [address]    RE: Denia Dailey       Dear Colleague,     I had the pleasure of seeing Denia Dailey in the [facility].      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  Her past medical history includes: mitral valve disease, chronic gastritis/colitis.     Denia was found to have a murmur on routine physical exam and underwent an ECHO 2021. This showed LVEF 60-65%, normal RV function, 3+ mitral regurgitation with borderline MV prolapse (ERO 0.11 cm2).      Before her cardiovascular evaluation, she was hospitalized 2021 for chest pain and was found to have biliary dyskinesia, biliary colic,with symptomatic cholelithiasis and subsequently underwent a lap cholecystectomy. A stress ECHO was performed, but imaging was poor. The EKG did not show any concern for ischemia.     Repeat ECHO 4/15/22 showed LVEF 55-60%, normal RV function, 2-3+ MR (ERO 0.20, regurg volume 34 ml) mild MV prolapse of posterior leaflet.     KAYLIN 22 showed LVEF 60-65%, RV is normal, left atrium is mildly dilated. There is moderate to mod-severe (2-3+) mitral regurgitation. Multiple jets.The mechanism apears to mix of mild posterior leaflet prolapse and degenerative valve disease.    Repeat echo 5/15/23 showed LVEF 55-60%, normal RV function, mild mitral valve prolapse, posterior leaflet. There is moderate to mod-  severe (2-3+) mitral regurgitation.     Ms Dailey is still working and feels well. She denies any cardiovascular complaints. She walks a few miles every day and denies any limitations. She specifically denies palpitations, chest pain, shortness of breath, orthopnea, or peripheral edema.           ROS:  Skin:  not assessed     Eyes:  not assessed    ENT:  not  assessed    Respiratory:  Positive for cough  Cardiovascular:  Negative    Gastroenterology: Positive for heartburn;reflux  Genitourinary:  not assessed    Musculoskeletal:  not assessed    Neurologic:  not assessed    Psychiatric:  not assessed    Heme/Lymph/Imm:  not assessed    Endocrine:  not assessed      PAST MEDICAL HISTORY:  Past Medical History:   Diagnosis Date    C. difficile colitis     CARDIOVASCULAR SCREENING; LDL GOAL LESS THAN 130     Chronic cough     Gastroparesis 2019    mild/moderate - [facility]    GERD (gastroesophageal reflux disease)     Heart disease 06/2021    Helicobacter pylori (H. pylori) infection     LPRD (laryngopharyngeal reflux disease)     Dr Hernandez - [facility]    Mitral regurgitation     moderate to severe, mild MVP, Dr Dixon    Nephrolithiasis 05/2021    bilateral, small    Osteoporosis 02/2023    Right leg numbness     Residual from lumbosacral neuritis    Thoracic or lumbosacral neuritis or radiculitis, unspecified 05/02/2007    Disc Herniation at L4-5, L5-S1    Unintentional weight loss 2021    Vitamin B12 deficiency (non anemic) 2021    Dr Stuart       PAST SURGICAL HISTORY:  Past Surgical History:   Procedure Laterality Date    BUNIONECTOMY  2009    COLONOSCOPY  7/09, 9/19    Diverticulosis, Ct Colonography, due 5 yrs    DAVINCI LAPAROSCOPIC CHOLECYSTECTOMY WITH GRAMS N/A 09/17/2021    Procedure: CHOLECYSTECTOMY, ROBOT-ASSISTED, LAPAROSCOPIC, WITH CHOLANGIOGRAM;  Surgeon: Chante Willis MD;  Location:  OR    ESOPHAGOSCOPY, GASTROSCOPY, DUODENOSCOPY (EGD), COMBINED N/A 03/15/2016    chronic gastritis    ESOPHAGOSCOPY, GASTROSCOPY, DUODENOSCOPY (EGD), COMBINED N/A 09/14/2021    Procedure: ESOPHAGOGASTRODUODENOSCOPY, WITH BIOPSies using biopsy forceps;  Surgeon: Tomasz Andres MD;  Location: [facility]    Fractured jaw  1966    ORTHOPEDIC SURGERY Right 2009    bunnionectomy    UPPER GI ENDOSCOPY  03/2016    Erythematous mucosa in the greater curvature.     ZZC CLOSED RX NOSE/JAW  FRAC+WIRES  1966    ZZC DISKECTOMY,PERCUTANEOUS LUMBAR  07/10/2007    L5-S1 with nerve root decompression       SOCIAL HISTORY:  Social History     Socioeconomic History    Marital status:      Spouse name: Haroldo    Number of children: 0    Years of education: 16   Occupational History    Occupation: NURSE     Employer: [employer],[address]   Tobacco Use    Smoking status: Former     Packs/day: 0.00     Years: 0.00     Pack years: 0.00     Types: Cigarettes    Smokeless tobacco: Never    Tobacco comments:     Light - quit at age 29   Vaping Use    Vaping status: Never Used   Substance and Sexual Activity    Alcohol use: Not Currently     Alcohol/week: 0.0 - 1.0 standard drinks of alcohol     Comment: Social 1-2 glasses of wine/month    Drug use: No    Sexual activity: Never   Other Topics Concern    Parent/sibling w/ CABG, MI or angioplasty before 65F 55M? No       FAMILY HISTORY:  Family History   Problem Relation Age of Onset    Breast Cancer Mother     Cancer Father         renal cell CA    Coronary Artery Disease Maternal Grandmother     Coronary Artery Disease Paternal Grandmother     Breast Cancer Maternal Aunt 70    Coronary Artery Disease Paternal Uncle     Ovarian Cancer No family hx of        MEDS: alendronate (FOSAMAX) 70 MG tablet, Take 70 mg by mouth once Pt takes once a week  CALCIUM + D 600-200 MG-UNIT OR TABS, Take 2 tablets by mouth 2 times daily  Cholecalciferol (VITAMIN D3 PO), Take by mouth daily  cyanocobalamin (CYANOCOBALAMIN) 1000 MCG/ML injection, Inject 1 mL into the muscle every 30 days  diclofenac (VOLTAREN) 1 % topical gel, 1 g  famotidine (PEPCID) 10 MG tablet, Take 10 mg by mouth 2 times daily OTC PRN  magnesium 250 MG tablet, Take 1 tablet by mouth daily.  mirtazapine (REMERON) 15 MG tablet, Take 1 tablet (15 mg) by mouth At Bedtime  omeprazole (PRILOSEC) 40 MG DR capsule, Take 1 capsule (40 mg) by mouth daily    No current facility-administered  "medications on file prior to visit.      ALLERGIES:   Allergies   Allergen Reactions    Vioxx      stomach upset    Nickel Rash     Accompanied by itching and swelling       PHYSICAL EXAM:  Vitals: /74 (BP Location: Right arm, Patient Position: Sitting, Cuff Size: Adult Regular)   Pulse 92   Ht 1.651 m (5' 5\")   Wt 55.6 kg (122 lb 8 oz)   LMP 08/08/2009   SpO2 98%   BMI 20.39 kg/m    Constitutional:  cooperative, alert and oriented, well developed, well nourished, in no acute distress thin      Skin:  warm and dry to the touch;no apparent skin lesions or masses noted        Head:  normocephalic        Eyes:  pupils equal and round;conjunctivae and lids unremarkable;sclera white;no xanthalasma        ENT:  no pallor or cyanosis, dentition good        Neck:  JVP normal        Respiratory:  clear to auscultation;normal respiratory excursion        Cardiac: regular rhythm;normal S1 and S2       holosystolic murmur;grade 2          GI:  abdomen soft;BS normoactive        Vascular: pulses full and equal                                      Extremities and Musculoskeletal:  no edema        Neurological:  affect appropriate;no gross motor deficits              LABS/DATA:  I reviewed the following:  Echo 5/15/23:  Interpretation Summary  1. The left ventricle is normal in size. There is normal left ventricular wall  thickness. Left ventricular systolic function is normal. The visual ejection  fraction is 55-60%. Diastolic Doppler findings (E/E' ratio and/or other  parameters) suggest left ventricular filling pressures are normal. No regional  wall motion abnormalities noted.  2. The right ventricle is normal size. The right ventricular systolic function  is normal.  3. The left atrium is mildly dilated.  4. Mild mitral valve prolapse, posterior leaflet. There is moderate to mod-  severe (2-3+) mitral regurgitation.  5. No pericardial effusion.  6. In comparison to the previous report dated 04/15/2022, the " findings are  Similar.          ASSESSMENT/PLAN:  Moderate to moderate-severe Mitral Regurgitation  -EF 55-60%, mild  MV prolapse of posterior leaflet, moderate to moderate-severe MR on echo 5/15/23  -asymptomatic  -repeat echo next year, sooner if sxs        Hyperlipidemia  -ASCVD 10 year risk < 7.5%, statin not recommended at this time  -LDL  108        Follow up:  1 year with echo and Dr. Zack Curtis PA-C      Thank you for allowing me to participate in the care of your patient.      Sincerely,     Fran Curtis PA-C     [facility]  cc:   Daysi Jovel, APRN CNP  [address]  [place] [address]

## 2023-07-24 NOTE — MR AVS SNAPSHOT
"              After Visit Summary   2/19/2018    Denia Dailey    MRN: [id number]           Patient Information     Date Of Birth          1955        Visit Information        Provider Department      2/19/2018 3:00 PM Rory Haque MD [facility]        Today's Diagnoses     Upper respiratory tract infection, unspecified type    -  1    Need for prophylactic vaccination with tetanus-diphtheria (TD)           Follow-ups after your visit        Who to contact     If you have questions or need follow up information about today's clinic visit or your schedule please contact [facility] directly at [phone].  Normal or non-critical lab and imaging results will be communicated to you by MyChart, letter or phone within 4 business days after the clinic has received the results. If you do not hear from us within 7 days, please contact the clinic through [employer] or phone. If you have a critical or abnormal lab result, we will notify you by phone as soon as possible.  Submit refill requests through [employer] or call your pharmacy and they will forward the refill request to us. Please allow 3 business days for your refill to be completed.          Additional Information About Your Visit        MyChart Information     [employer] gives you secure access to your electronic health record. If you see a primary care provider, you can also send messages to your care team and make appointments. If you have questions, please call your primary care clinic.  If you do not have a primary care provider, please call [phone] and they will assist you.        Care EveryWhere ID     This is your Care EveryWhere ID. This could be used by other organizations to access your [facility] medical records  [id number]        Your Vitals Were     Pulse Temperature Height Last Period Pulse Oximetry BMI (Body Mass Index)    117 98.4  F (36.9  C) (Oral) 5' 5\" (1.651 m) 08/08/2009 100% 24.3 kg/m2       Blood " Pressure from Last 3 Encounters:   02/19/18 124/78   12/30/17 126/74   09/06/17 137/84    Weight from Last 3 Encounters:   02/19/18 146 lb (66.2 kg)   12/30/17 148 lb (67.1 kg)   08/24/17 158 lb 6.4 oz (71.8 kg)              We Performed the Following          ADMIN VACCINE, FIRST     TDAP VACCINE (ADACEL)        Primary Care Provider Office Phone # Fax #    Pa Montana [phone] [address] [facility] [address]        Equal Access to Services     [facility]: Hadii aad ku hadasho Soomaali, waaxda luqadaha, qaybta kaalmada adeegyada, leonard brady . So [facility] [phone].    ATENCIÓN: Si habla español, tiene a parikh disposición servicios gratuitos de asistencia lingüística. [facility] [phone].    We comply with applicable federal civil rights laws and [place] laws. We do not discriminate on the basis of race, color, national origin, age, disability, sex, sexual orientation, or gender identity.            Thank you!     Thank you for choosing [facility]  for your care. Our goal is always to provide you with excellent care. Hearing back from our patients is one way we can continue to improve our services. Please take a few minutes to complete the written survey that you may receive in the mail after your visit with us. Thank you!             Your Updated Medication List - Protect others around you: Learn how to safely use, store and throw away your medicines at www.disposemymeds.org.          This list is accurate as of 2/19/18  3:39 PM.  Always use your most recent med list.                   Brand Name Dispense Instructions for use Diagnosis    calcium + D 600-200 MG-UNIT Tabs   Generic drug:  calcium carbonate-vitamin D      TWICE A DAY        fluticasone-vilanterol 200-25 MCG/INH oral inhaler    BREO ELLIPTA    1 Inhaler    Inhale 1 puff into the lungs daily    Chronic cough       magnesium 250 MG tablet     100 tablet    Take 1  VIKAS, rhabdomyolysis tablet by mouth daily.        ranitidine 150 MG tablet    ZANTAC    60 tablet    Take 1 tablet (150 mg) by mouth 2 times daily    Gastroesophageal reflux disease with esophagitis       vitamin D 2000 UNITS tablet     100 tablet    Take 2,000 Units by mouth daily

## 2023-08-07 ENCOUNTER — LAB REQUISITION (OUTPATIENT)
Dept: LAB | Facility: CLINIC | Age: 68
End: 2023-08-07
Payer: COMMERCIAL

## 2023-08-07 ENCOUNTER — HOSPITAL ENCOUNTER (OUTPATIENT)
Dept: MAMMOGRAPHY | Facility: CLINIC | Age: 68
Discharge: HOME | End: 2023-08-07
Attending: FAMILY MEDICINE | Admitting: FAMILY MEDICINE
Payer: COMMERCIAL

## 2023-08-07 DIAGNOSIS — Z12.31 VISIT FOR SCREENING MAMMOGRAM: ICD-10-CM

## 2023-08-07 DIAGNOSIS — Z80.51 FAMILY HISTORY OF MALIGNANT NEOPLASM OF KIDNEY: ICD-10-CM

## 2023-08-07 PROCEDURE — 77067 SCR MAMMO BI INCL CAD: CPT

## 2023-11-22 ENCOUNTER — PATIENT OUTREACH (OUTPATIENT)
Dept: ONCOLOGY | Facility: CLINIC | Age: 68
End: 2023-11-22

## 2023-11-22 ENCOUNTER — OFFICE VISIT (OUTPATIENT)
Dept: FAMILY MEDICINE | Facility: CLINIC | Age: 68
End: 2023-11-22
Payer: COMMERCIAL

## 2023-11-22 VITALS
DIASTOLIC BLOOD PRESSURE: 78 MMHG | OXYGEN SATURATION: 97 % | HEART RATE: 84 BPM | BODY MASS INDEX: 20.96 KG/M2 | WEIGHT: 122.8 LBS | HEIGHT: 64 IN | SYSTOLIC BLOOD PRESSURE: 122 MMHG | RESPIRATION RATE: 16 BRPM | TEMPERATURE: 97.7 F

## 2023-11-22 DIAGNOSIS — Z80.3 FAMILY HISTORY OF BREAST CANCER: ICD-10-CM

## 2023-11-22 DIAGNOSIS — M81.0 AGE-RELATED OSTEOPOROSIS WITHOUT CURRENT PATHOLOGICAL FRACTURE: ICD-10-CM

## 2023-11-22 DIAGNOSIS — R05.3 CHRONIC COUGH: ICD-10-CM

## 2023-11-22 DIAGNOSIS — K21.9 LPRD (LARYNGOPHARYNGEAL REFLUX DISEASE): ICD-10-CM

## 2023-11-22 DIAGNOSIS — K21.00 GASTROESOPHAGEAL REFLUX DISEASE WITH ESOPHAGITIS WITHOUT HEMORRHAGE: ICD-10-CM

## 2023-11-22 DIAGNOSIS — Z51.81 MEDICATION MONITORING ENCOUNTER: ICD-10-CM

## 2023-11-22 DIAGNOSIS — E53.8 VITAMIN B12 DEFICIENCY (NON ANEMIC): ICD-10-CM

## 2023-11-22 DIAGNOSIS — Z12.11 SCREEN FOR COLON CANCER: ICD-10-CM

## 2023-11-22 DIAGNOSIS — Z00.00 ROUTINE GENERAL MEDICAL EXAMINATION AT A HEALTH CARE FACILITY: Primary | ICD-10-CM

## 2023-11-22 DIAGNOSIS — K31.84 GASTROPARESIS: ICD-10-CM

## 2023-11-22 DIAGNOSIS — K82.8 BILIARY DYSKINESIA: ICD-10-CM

## 2023-11-22 DIAGNOSIS — Z13.820 SCREENING FOR OSTEOPOROSIS: ICD-10-CM

## 2023-11-22 DIAGNOSIS — Z00.00 MEDICARE ANNUAL WELLNESS VISIT, SUBSEQUENT: ICD-10-CM

## 2023-11-22 DIAGNOSIS — Z13.6 CARDIOVASCULAR SCREENING; LDL GOAL LESS THAN 130: ICD-10-CM

## 2023-11-22 DIAGNOSIS — Z12.31 ENCOUNTER FOR SCREENING MAMMOGRAM FOR MALIGNANT NEOPLASM OF BREAST: ICD-10-CM

## 2023-11-22 PROCEDURE — 99397 PER PM REEVAL EST PAT 65+ YR: CPT | Performed by: FAMILY MEDICINE

## 2023-11-22 PROCEDURE — 99213 OFFICE O/P EST LOW 20 MIN: CPT | Mod: 25 | Performed by: FAMILY MEDICINE

## 2023-11-22 RX ORDER — ALENDRONATE SODIUM 70 MG/1
70 TABLET ORAL
Qty: 15 TABLET | Refills: 3 | Status: SHIPPED | OUTPATIENT
Start: 2023-11-22 | End: 2024-09-30

## 2023-11-22 RX ORDER — MIRTAZAPINE 15 MG/1
15 TABLET, FILM COATED ORAL AT BEDTIME
Qty: 90 TABLET | Refills: 3 | Status: SHIPPED | OUTPATIENT
Start: 2023-11-22 | End: 2024-09-30

## 2023-11-22 ASSESSMENT — ENCOUNTER SYMPTOMS
CONSTIPATION: 0
DYSURIA: 0
NERVOUS/ANXIOUS: 0
ABDOMINAL PAIN: 0
BREAST MASS: 0
SHORTNESS OF BREATH: 0
MYALGIAS: 0
PALPITATIONS: 0
SORE THROAT: 0
FREQUENCY: 0
COUGH: 1
ARTHRALGIAS: 1
PARESTHESIAS: 0
JOINT SWELLING: 0
DIARRHEA: 0
HEARTBURN: 0
NAUSEA: 0
HEMATOCHEZIA: 0
DIZZINESS: 0
EYE PAIN: 0
HEMATURIA: 0
HEADACHES: 0
FEVER: 0
CHILLS: 0
WEAKNESS: 0

## 2023-11-22 ASSESSMENT — ACTIVITIES OF DAILY LIVING (ADL): CURRENT_FUNCTION: NO ASSISTANCE NEEDED

## 2023-11-22 NOTE — PROGRESS NOTES
Writer received referral to Cancer Risk Management/Genetic Counseling.    Referred for:     Family history of breast cancer        Referral reviewed for appropriate plan, and sent to New Patient Scheduling for completion.    Faye Toledo, RN, BSN  Oncology New Patient Nurse Navigator   [facility]  [phone]

## 2023-11-22 NOTE — PROGRESS NOTES
"[facility] [address] Loza is a 68 year old, presenting for the followin/22/2023    11:04 AM   Additional Questions   Roomed by Ashley SMITH     Are you in the first 12 months of your Medicare coverage?  No    Healthy Habits:     In general, how would you rate your overall health?  Good    Frequency of exercise:  6-7 days/week    Duration of exercise:  45-60 minutes    Do you usually eat at least 4 servings of fruit and vegetables a day, include whole grains    & fiber and avoid regularly eating high fat or \"junk\" foods?  No    Taking medications regularly:  Yes    Medication side effects:  None    Ability to successfully perform activities of daily living:  No assistance needed    Home Safety:  No safety concerns identified    Hearing Impairment:  Difficulty following a conversation in a noisy restaurant or crowded room    In the past 6 months, have you been bothered by leaking of urine?  No    In general, how would you rate your overall mental or emotional health?  Excellent    Additional concerns today:  Yes    Have you ever done Advance Care Planning? (For example, a Health Directive, POLST, or a discussion with a medical provider or your loved ones about your wishes): Yes, patient states has an Advance Care Planning document and will bring a copy to the clinic.     Fall risk  Fallen 2 or more times in the past year?: No  Any fall with injury in the past year?: No    Cognitive Screening   1) Repeat 3 items (Leader, Season, Table)    2) Clock draw: NORMAL  3) 3 item recall: Recalls 3 objects  Results: 3 items recalled: COGNITIVE IMPAIRMENT LESS LIKELY    Mini-[employer]ogTM [employer]. Licensed by the author for use in [facility]; reprinted with permission (yudi@.[email or web address]). All rights reserved.      Do you have sleep apnea, excessive snoring or daytime drowsiness? : no    Reviewed and updated as needed this visit by clinical staff   Tobacco  Allergies  Meds  " Problems  Med Hx  Surg Hx  Fam Hx          Reviewed and updated as needed this visit by Provider                 Social History     Tobacco Use    Smoking status: Former     Packs/day: 0.00     Years: 0.00     Additional pack years: 0.00     Total pack years: 0.00     Types: Cigarettes    Smokeless tobacco: Never    Tobacco comments:     Light - quit at age 29   Substance Use Topics    Alcohol use: Yes     Alcohol/week: 0.0 - 1.0 standard drinks of alcohol     Comment: Social 1-2 glasses of wine/month             11/22/2023    11:07 AM   Alcohol Use   Prescreen: >3 drinks/day or >7 drinks/week? No          No data to display              Do you have a current opioid prescription? No  Do you use any other controlled substances or medications that are not prescribed by a provider? None      Current providers sharing in care for this patient include:     Patient Care Team:  Pa Montana MD as PCP - General (Family Practice)  Pa Montana MD as Assigned PCP  Tati Balderas APRN CNP as Nurse Practitioner (Cardiovascular Disease)  Fran Curtis PA-C as Assigned Heart and Vascular Provider    The following health maintenance items are reviewed in Epic and correct as of today:  Health Maintenance   Topic Date Due    RSV VACCINE (Pregnancy & 60+) (1 - 1-dose 60+ series) Never done    LUNG CANCER SCREENING  09/15/2022    COVID-19 Vaccine (5 - 2023-24 season) 09/01/2023    MEDICARE ANNUAL WELLNESS VISIT  11/22/2024    ANNUAL REVIEW OF HM ORDERS  11/22/2024    FALL RISK ASSESSMENT  11/22/2024    MAMMO SCREENING  08/07/2025    DEXA  02/01/2026    LIPID  10/10/2027    DTAP/TDAP/TD IMMUNIZATION (2 - Td or Tdap) 02/19/2028    ADVANCE CARE PLANNING  11/22/2028    COLORECTAL CANCER SCREENING  12/31/2029    HEPATITIS C SCREENING  Completed    PHQ-2 (once per calendar year)  Completed    INFLUENZA VACCINE  Completed    Pneumococcal Vaccine: 65+ Years  Completed    ZOSTER IMMUNIZATION  Completed    IPV IMMUNIZATION  Aged Out     HPV IMMUNIZATION  Aged Out    MENINGITIS IMMUNIZATION  Aged Out    RSV MONOCLONAL ANTIBODY  Aged Out    PAP  Discontinued     FHS-7:       7/21/2021     1:15 PM 7/21/2021     1:19 PM 7/27/2022     9:58 AM 8/7/2023     2:28 PM   Breast CA Risk Assessment (FHS-7)   Did any of your first-degree relatives have breast or ovarian cancer? No  Yes Yes   Did any of your relatives have bilateral breast cancer? No  No No   Did any man in your family have breast cancer? No  No No   Did any woman in your family have breast and ovarian cancer? No  No No   Did any woman in your family have breast cancer before age 50 y? No  No No   Do you have 2 or more relatives with breast and/or ovarian cancer? No Yes Yes Yes   Do you have 2 or more relatives with breast and/or bowel cancer? No  No No     Sister (2 lesions) - mother - maternal aunt - Breast cancer, sister noted to be chek 2 deficient, dad renal cancer    Mammogram Screening: Recommended annual mammography  Pertinent mammograms are reviewed under the imaging tab.    Review of Systems   Constitutional:  Negative for chills and fever.   HENT:  Positive for hearing loss. Negative for congestion, ear pain and sore throat.    Eyes:  Negative for pain and visual disturbance.   Respiratory:  Positive for cough. Negative for shortness of breath.    Cardiovascular:  Negative for chest pain, palpitations and peripheral edema.   Gastrointestinal:  Negative for abdominal pain, constipation, diarrhea, heartburn, hematochezia and nausea.   Breasts:  Negative for tenderness, breast mass and discharge.   Genitourinary:  Negative for dysuria, frequency, genital sores, hematuria, pelvic pain, urgency, vaginal bleeding and vaginal discharge.   Musculoskeletal:  Positive for arthralgias. Negative for joint swelling and myalgias.   Skin:  Negative for rash.   Neurological:  Negative for dizziness, weakness, headaches and paresthesias.   Psychiatric/Behavioral:  Negative for mood changes. The patient  is not nervous/anxious.      Extensive work up for weight loss - negative    GERD/LPRD - off omeprazole, controlled with famotidine     Chronic Cough improving    Gastroparesis at times with decreased weight / occasional N/V - work up with Dr Ramirez,  GI, recent EGD with Dr Andres    Lipids    Recent Labs   Lab Test 10/10/22  0912 05/24/21  0937   CHOL 195 202*   HDL 68 60   * 117*   TRIG 97 127     Osteoporosis - DEXA scan UTD - using Calcium / Vitamin D / Fosamax    B12 deficiency - Dr Stuart    Health Maintenance     Colonoscopy:  due 9/2024   FIT:  NA              Mammogram:  due 8/2024              PAP:  NA - Dr Kirk   DEXA:  due 2/2025              ECHO: 5/2024 - Dr Dixon    Health Maintenance Due   Topic Date Due    RSV VACCINE (Pregnancy & 60+) (1 - 1-dose 60+ series) Never done    LUNG CANCER SCREENING  09/15/2022    COVID-19 Vaccine (5 - 2023-24 season) 09/01/2023       Current Problem List    Patient Active Problem List   Diagnosis    Thoracic or lumbosacral neuritis or radiculitis, unspecified    Lumbago    CARDIOVASCULAR SCREENING; LDL GOAL LESS THAN 130    Advanced directives, counseling/discussion    GERD (gastroesophageal reflux disease)    Chronic cough    Gastroparesis    LPRD (laryngopharyngeal reflux disease)    Mitral regurgitation    Vitamin B12 deficiency (non anemic)    Osteoporosis       Past Medical History    Past Medical History:   Diagnosis Date    C. difficile colitis     CARDIOVASCULAR SCREENING; LDL GOAL LESS THAN 130     Chronic cough     Gastroparesis 2019    mild/moderate - [facility]    GERD (gastroesophageal reflux disease)     Heart disease 06/2021    Helicobacter pylori (H. pylori) infection     LPRD (laryngopharyngeal reflux disease)     Dr Hernandez - [facility]    Mitral regurgitation     moderate to severe, mild MVP, Dr Dixon    Nephrolithiasis 05/2021    bilateral, small    Osteoporosis 02/2023    Right leg numbness     Residual from lumbosacral neuritis    Thoracic or  lumbosacral neuritis or radiculitis, unspecified 05/02/2007    Disc Herniation at L4-5, L5-S1    Unintentional weight loss 2021    Vitamin B12 deficiency (non anemic) 2021    Dr Stuart       Past Surgical History    Past Surgical History:   Procedure Laterality Date    BUNIONECTOMY Right 2009    COLONOSCOPY  7/09, 9/19    Diverticulosis, Ct Colonography, due 5 yrs    DAVINCI LAPAROSCOPIC CHOLECYSTECTOMY WITH GRAMS N/A 09/17/2021    Procedure: CHOLECYSTECTOMY, ROBOT-ASSISTED, LAPAROSCOPIC, WITH CHOLANGIOGRAM;  Surgeon: Chante Willis MD;  Location: [facility] OR    ESOPHAGOSCOPY, GASTROSCOPY, DUODENOSCOPY (EGD), COMBINED N/A 03/15/2016    chronic gastritis    ESOPHAGOSCOPY, GASTROSCOPY, DUODENOSCOPY (EGD), COMBINED N/A 09/14/2021    Procedure: ESOPHAGOGASTRODUODENOSCOPY, WITH BIOPSies using biopsy forceps;  Surgeon: Tomasz Andres MD;  Location: [facility]    Fractured jaw  1966    UPPER GI ENDOSCOPY  03/2016    Erythematous mucosa in the greater curvature.     [facility] CLOSED RX NOSE/JAW FRAC+WIRES  1966    [facility] DISKECTOMY,PERCUTANEOUS LUMBAR  07/10/2007    L5-S1 with nerve root decompression       Current Medications    Current Outpatient Medications   Medication Sig Dispense Refill    alendronate (FOSAMAX) 70 MG tablet Take 1 tablet (70 mg) by mouth every 7 days Pt takes once a week 15 tablet 3    CALCIUM + D 600-200 MG-UNIT OR TABS Take 2 tablets by mouth 2 times daily      Cholecalciferol (VITAMIN D3 PO) Take by mouth daily      cyanocobalamin (CYANOCOBALAMIN) 1000 MCG/ML injection Inject 1 mL into the muscle every 30 days      diclofenac (VOLTAREN) 1 % topical gel 1 g      famotidine (PEPCID) 10 MG tablet Take 10 mg by mouth 2 times daily OTC PRN      magnesium 250 MG tablet Take 1 tablet by mouth daily. 100 tablet 3    mirtazapine (REMERON) 15 MG tablet Take 1 tablet (15 mg) by mouth at bedtime 90 tablet 3    omeprazole (PRILOSEC) 40 MG DR capsule Take 1 capsule (40 mg) by mouth daily 180 capsule 3        Allergies    Allergies   Allergen Reactions    Nickel Rash     Accompanied by itching and swelling    Vioxx GI Disturbance     stomach upset       Immunizations    Immunization History   Administered Date(s) Administered    COVID-19 MONOVALENT 12+ (Pfizer) 12/29/2020, 01/15/2021, 10/12/2021    COVID-19 Monovalent 12+ (Pfizer 2022) 07/20/2022    Flu, Unspecified 11/09/2022    Hepatitis B, Adult 01/01/1991, 02/01/1992, 06/01/1992    Influenza (High Dose) 3 valent vaccine 10/25/2021, 11/09/2022, 11/01/2023    Influenza (IIV3) PF 10/01/2013    Influenza Vaccine >6 months,quad, PF 10/13/2019    Nasal Influenza Vaccine 2-49 (FluMist) 10/25/2017, 10/15/2020    Pneumo Conj 13-V (2010&after) 05/24/2021    Pneumococcal 23 valent 10/10/2022    TDAP Vaccine (Adacel) 02/19/2018    Tetanus 03/01/2006    Zoster recombinant adjuvanted (SHINGRIX) 05/24/2021, 08/30/2021       Family History    Family History   Problem Relation Age of Onset    Breast Cancer Mother     Kidney Cancer Father         renal cell CA    Coronary Artery Disease Maternal Grandmother     Coronary Artery Disease Paternal Grandmother     Breast Cancer Maternal Aunt 70    Multiple myeloma Maternal Aunt     Coronary Artery Disease Paternal Uncle     Ovarian Cancer No family hx of        Social History    Social History     Socioeconomic History    Marital status:      Spouse name: Haroldo    Number of children: 0    Years of education: 16    Highest education level: Not on file   Occupational History    Occupation: NURSE     Employer: [employer]   Tobacco Use    Smoking status: Former     Packs/day: 0.00     Years: 0.00     Additional pack years: 0.00     Total pack years: 0.00     Types: Cigarettes    Smokeless tobacco: Never    Tobacco comments:     Light - quit at age 29   Vaping Use    Vaping Use: Never used   Substance and Sexual Activity    Alcohol use: Yes     Alcohol/week: 0.0 - 1.0 standard drinks of alcohol      Comment: Social 1-2 glasses of wine/month    Drug use: No    Sexual activity: Never   Other Topics Concern     Service Not Asked    Blood Transfusions Not Asked    Caffeine Concern Not Asked    Occupational Exposure Not Asked    Hobby Hazards Not Asked    Sleep Concern Not Asked    Stress Concern Not Asked    Weight Concern Not Asked    Special Diet Not Asked    Back Care Not Asked    Exercise Not Asked    Bike Helmet Not Asked    Seat Belt Not Asked    Self-Exams Not Asked    Parent/sibling w/ CABG, MI or angioplasty before 65F 55M? No   Social History Narrative    Not on file     Social Determinants of Health     Financial Resource Strain: Low Risk  (11/22/2023)    Financial Resource Strain     Within the past 12 months, have you or your family members you live with been unable to get utilities (heat, electricity) when it was really needed?: No   Food Insecurity: Low Risk  (11/22/2023)    Food Insecurity     Within the past 12 months, did you worry that your food would run out before you got money to buy more?: No     Within the past 12 months, did the food you bought just not last and you didn t have money to get more?: No   Transportation Needs: Low Risk  (11/22/2023)    Transportation Needs     Within the past 12 months, has lack of transportation kept you from medical appointments, getting your medicines, non-medical meetings or appointments, work, or from getting things that you need?: No   Physical Activity: Not on file   Stress: Not on file   Social Connections: Not on file   Interpersonal Safety: Low Risk  (11/22/2023)    Interpersonal Safety     Do you feel physically and emotionally safe where you currently live?: Yes     Within the past 12 months, have you been hit, slapped, kicked or otherwise physically hurt by someone?: No     Within the past 12 months, have you been humiliated or emotionally abused in other ways by your partner or ex-partner?: No   Housing Stability: Low Risk  (11/22/2023)  "   Housing Stability     Do you have housing? : Yes     Are you worried about losing your housing?: No       ROS    CONSTITUTIONAL: NEGATIVE for fever, chills, change in weight  INTEGUMENTARY/SKIN: NEGATIVE for worrisome rashes, moles or lesions  EYES: NEGATIVE for vision changes or irritation  ENT/MOUTH: NEGATIVE for ear, mouth and throat problems  RESP: NEGATIVE for significant cough or SOB  BREAST: NEGATIVE for masses, tenderness or discharge  CV: NEGATIVE for chest pain, palpitations or peripheral edema  GI: NEGATIVE for nausea, abdominal pain, heartburn, or change in bowel habits  : NEGATIVE for frequency, dysuria, or hematuria  MUSCULOSKELETAL: NEGATIVE for significant arthralgias or myalgia  NEURO: NEGATIVE for weakness, dizziness or paresthesias  ENDOCRINE: NEGATIVE for temperature intolerance, skin/hair changes  HEME: NEGATIVE for bleeding problems  PSYCHIATRIC: NEGATIVE for changes in mood or affect    OBJECTIVE    /78   Pulse 84   Temp 97.7  F (36.5  C) (Tympanic)   Resp 16   Ht 1.626 m (5' 4\")   Wt 55.7 kg (122 lb 12.8 oz)   LMP 08/08/2009   SpO2 97%   BMI 21.08 kg/m    EXAM:  GENERAL: healthy, alert and no distress  EYES: Eyes grossly normal to inspection, PERRL and conjunctivae and sclerae normal  HENT: ear canals and TM's normal, nose and mouth without ulcers or lesions  NECK: no adenopathy, no asymmetry, masses, or scars and thyroid normal to palpation  RESP: lungs clear to auscultation - no rales, rhonchi or wheezes  BREAST: Dr Kirk  CV: regular rate and rhythm, normal S1 S2, no S3 or S4, no murmur, click or rub, no peripheral edema and peripheral pulses strong  ABDOMEN: soft, nontender, no hepatosplenomegaly, no masses and bowel sounds normal   (female): Dr Kirk  MS: no gross musculoskeletal defects noted, no edema  SKIN: no suspicious lesions or rashes  NEURO: Normal strength and tone, mentation intact and speech normal  PSYCH: mentation appears normal, affect " normal/bright  LYMPH: no cervical, supraclavicular, axillary, or inguinal adenopathy    DIAGNOSTICS/PROCEDURES    Pending    ASSESSMENT      ICD-10-CM    1. Routine general medical examination at a health care facility  Z00.00 Comprehensive metabolic panel     Lipid panel reflex to direct LDL Fasting     CBC with platelets     CK total     UA Macroscopic with reflex to Microscopic and Culture     Albumin Random Urine Quantitative with Creat Ratio     TSH with free T4 reflex     Fecal colorectal cancer screen FIT     Vitamin B12     Vitamin D Deficiency      2. Medicare annual wellness visit, subsequent  Z00.00 Comprehensive metabolic panel     Lipid panel reflex to direct LDL Fasting     CBC with platelets     CK total     UA Macroscopic with reflex to Microscopic and Culture     Albumin Random Urine Quantitative with Creat Ratio     TSH with free T4 reflex     Fecal colorectal cancer screen FIT     Vitamin B12     Vitamin D Deficiency      3. CARDIOVASCULAR SCREENING; LDL GOAL LESS THAN 130  Z13.6 Comprehensive metabolic panel     Lipid panel reflex to direct LDL Fasting     CK total     OFFICE/OUTPT VISIT,EST,LEVL IV      4. Gastroesophageal reflux disease with esophagitis without hemorrhage  K21.00 CBC with platelets     OFFICE/OUTPT VISIT,EST,LEVL IV      5. Gastroparesis  K31.84 CBC with platelets     OFFICE/OUTPT VISIT,EST,LEVL IV      6. LPRD (laryngopharyngeal reflux disease)  K21.9 CBC with platelets     OFFICE/OUTPT VISIT,EST,LEVL IV      7. Chronic cough  R05.3 CBC with platelets     OFFICE/OUTPT VISIT,EST,LEVL IV      8. Age-related osteoporosis without current pathological fracture  M81.0 Comprehensive metabolic panel     Vitamin D Deficiency     alendronate (FOSAMAX) 70 MG tablet     OFFICE/OUTPT VISIT,EST,LEVL IV      9. Vitamin B12 deficiency (non anemic)  E53.8 Comprehensive metabolic panel     Vitamin B12     OFFICE/OUTPT VISIT,EST,LEVL IV      10. Biliary dyskinesia  K82.8 mirtazapine (REMERON) 15  MG tablet     OFFICE/OUTPT VISIT,EST,LEVL IV      11. Family history of breast cancer  Z80.3 Adult Genetics & Metabolism Referral      12. Encounter for screening mammogram for malignant neoplasm of breast  Z12.31 OFFICE/OUTPT VISIT,EST,LEVL IV      13. Screen for colon cancer  Z12.11 Fecal colorectal cancer screen FIT     OFFICE/OUTPT VISIT,EST,LEVL IV      14. Screening for osteoporosis  Z13.820 OFFICE/OUTPT VISIT,EST,LEVL IV      15. Medication monitoring encounter  Z51.81 Comprehensive metabolic panel     Lipid panel reflex to direct LDL Fasting     CBC with platelets     CK total     UA Macroscopic with reflex to Microscopic and Culture     Albumin Random Urine Quantitative with Creat Ratio     TSH with free T4 reflex     Vitamin B12     Vitamin D Deficiency     OFFICE/OUTPT VISIT,EST,LEVL IV          PLAN    Discussed treatment/modality options, including risk and benefits, she desires:    advised alcohol consumption 1oz per day or less, advised 1 multivitamin per day, advised calcium 0499-9276 mg/d and Vitamin D 800-1200 IU/d, advised dentist every 6 months, advised diet and exercise, advised opthalmologist every 1-2 years, advised self breast exam q month, further health care maintenance, further lab(s), immunization(s), medication refill(s), and observation    All diagnosis above reviewed and noted above, otherwise stable.      See [facility] orders for further details.      1) meds refilled    2) labs pending    3) immunizations - declines covid, consider prevnar 20, RSV    4) Dr Kirk    5) Dr Dixon / [facility]    6) Dr Ramirez    7) Dr Stuart    8) Genetic counseling    Return in about 1 year (around 11/22/2024) for Complete Physical, Medication Recheck Visit, Follow Up Chronic.    Health Maintenance Due   Topic Date Due    RSV VACCINE (Pregnancy & 60+) (1 - 1-dose 60+ series) Never done    LUNG CANCER SCREENING  09/15/2022    COVID-19 Vaccine (5 - 2023-24 season) 09/01/2023       COUNSELING    Reviewed  "preventive health counseling, as reflected in patient instructions    BP Readings from Last 1 Encounters:   11/22/23 122/78     Estimated body mass index is 21.08 kg/m  as calculated from the following:    Height as of this encounter: 1.626 m (5' 4\").    Weight as of this encounter: 55.7 kg (122 lb 12.8 oz).           reports that she has quit smoking. Her smoking use included cigarettes. She has never used smokeless tobacco.           Pa Montana MD, [facility]  [address] [place] [id number]  [email or web address]   Office: [phone]  Fax: [phone]  Pager: [phone]     Identified Health Risks:  I have reviewed Opioid Use Disorder and Substance Use Disorder risk factors and made any needed referrals.   "

## 2023-11-22 NOTE — PATIENT INSTRUCTIONS
Patient Education   Personalized Prevention Plan  You are due for the preventive services outlined below.  Your care team is available to assist you in scheduling these services.  If you have already completed any of these items, please share that information with your care team to update in your medical record.  Health Maintenance Due   Topic Date Due     RSV VACCINE (Pregnancy & 60+) (1 - 1-dose 60+ series) Never done     LUNG CANCER SCREENING  09/15/2022     Discuss Advance Care Planning  06/13/2023     ANNUAL REVIEW OF HM ORDERS  08/03/2023     COVID-19 Vaccine (5 - 2023-24 season) 09/01/2023     Annual Wellness Visit  10/10/2023     FALL RISK ASSESSMENT  10/10/2023        
hypoxia

## 2023-12-08 ENCOUNTER — LAB (OUTPATIENT)
Dept: LAB | Facility: CLINIC | Age: 68
End: 2023-12-08
Payer: COMMERCIAL

## 2023-12-08 DIAGNOSIS — K31.84 GASTROPARESIS: ICD-10-CM

## 2023-12-08 DIAGNOSIS — K21.9 LPRD (LARYNGOPHARYNGEAL REFLUX DISEASE): ICD-10-CM

## 2023-12-08 DIAGNOSIS — Z51.81 MEDICATION MONITORING ENCOUNTER: ICD-10-CM

## 2023-12-08 DIAGNOSIS — E53.8 VITAMIN B12 DEFICIENCY (NON ANEMIC): ICD-10-CM

## 2023-12-08 DIAGNOSIS — K21.00 GASTROESOPHAGEAL REFLUX DISEASE WITH ESOPHAGITIS WITHOUT HEMORRHAGE: ICD-10-CM

## 2023-12-08 DIAGNOSIS — Z12.11 SCREEN FOR COLON CANCER: ICD-10-CM

## 2023-12-08 DIAGNOSIS — Z13.6 CARDIOVASCULAR SCREENING; LDL GOAL LESS THAN 130: ICD-10-CM

## 2023-12-08 DIAGNOSIS — M81.0 AGE-RELATED OSTEOPOROSIS WITHOUT CURRENT PATHOLOGICAL FRACTURE: ICD-10-CM

## 2023-12-08 DIAGNOSIS — Z00.00 MEDICARE ANNUAL WELLNESS VISIT, SUBSEQUENT: ICD-10-CM

## 2023-12-08 DIAGNOSIS — Z00.00 ROUTINE GENERAL MEDICAL EXAMINATION AT A HEALTH CARE FACILITY: ICD-10-CM

## 2023-12-08 DIAGNOSIS — R05.3 CHRONIC COUGH: ICD-10-CM

## 2023-12-08 LAB
ALBUMIN SERPL BCG-MCNC: 4.3 G/DL (ref 3.5–5.2)
ALBUMIN UR-MCNC: ABNORMAL MG/DL
ALP SERPL-CCNC: 64 U/L (ref 40–150)
ALT SERPL W P-5'-P-CCNC: 11 U/L (ref 0–50)
ANION GAP SERPL CALCULATED.3IONS-SCNC: 10 MMOL/L (ref 7–15)
APPEARANCE UR: CLEAR
AST SERPL W P-5'-P-CCNC: 23 U/L (ref 0–45)
BACTERIA #/AREA URNS HPF: ABNORMAL /HPF
BILIRUB SERPL-MCNC: 0.5 MG/DL
BILIRUB UR QL STRIP: NEGATIVE
BUN SERPL-MCNC: 16.9 MG/DL (ref 8–23)
CALCIUM SERPL-MCNC: 8.7 MG/DL (ref 8.8–10.2)
CHLORIDE SERPL-SCNC: 104 MMOL/L (ref 98–107)
CHOLEST SERPL-MCNC: 198 MG/DL
CK SERPL-CCNC: 68 U/L (ref 26–192)
COLOR UR AUTO: YELLOW
CREAT SERPL-MCNC: 0.75 MG/DL (ref 0.51–0.95)
CREAT UR-MCNC: 164 MG/DL
DEPRECATED HCO3 PLAS-SCNC: 26 MMOL/L (ref 22–29)
EGFRCR SERPLBLD CKD-EPI 2021: 87 ML/MIN/1.73M2
ERYTHROCYTE [DISTWIDTH] IN BLOOD BY AUTOMATED COUNT: 12.8 % (ref 10–15)
FASTING STATUS PATIENT QL REPORTED: YES
GLUCOSE SERPL-MCNC: 98 MG/DL (ref 70–99)
GLUCOSE UR STRIP-MCNC: NEGATIVE MG/DL
HCT VFR BLD AUTO: 41.1 % (ref 35–47)
HDLC SERPL-MCNC: 63 MG/DL
HGB BLD-MCNC: 14.1 G/DL (ref 11.7–15.7)
HGB UR QL STRIP: ABNORMAL
HYALINE CASTS #/AREA URNS LPF: ABNORMAL /LPF
KETONES UR STRIP-MCNC: ABNORMAL MG/DL
LDLC SERPL CALC-MCNC: 119 MG/DL
LEUKOCYTE ESTERASE UR QL STRIP: ABNORMAL
MCH RBC QN AUTO: 30.9 PG (ref 26.5–33)
MCHC RBC AUTO-ENTMCNC: 34.3 G/DL (ref 31.5–36.5)
MCV RBC AUTO: 90 FL (ref 78–100)
MICROALBUMIN UR-MCNC: 12.6 MG/L
MICROALBUMIN/CREAT UR: 7.68 MG/G CR (ref 0–25)
NITRATE UR QL: NEGATIVE
NONHDLC SERPL-MCNC: 135 MG/DL
PH UR STRIP: 7.5 [PH] (ref 5–7)
PLATELET # BLD AUTO: 247 10E3/UL (ref 150–450)
POTASSIUM SERPL-SCNC: 3.6 MMOL/L (ref 3.4–5.3)
PROT SERPL-MCNC: 6.5 G/DL (ref 6.4–8.3)
RBC # BLD AUTO: 4.57 10E6/UL (ref 3.8–5.2)
RBC #/AREA URNS AUTO: ABNORMAL /HPF
SODIUM SERPL-SCNC: 140 MMOL/L (ref 135–145)
SP GR UR STRIP: 1.02 (ref 1–1.03)
SQUAMOUS #/AREA URNS AUTO: ABNORMAL /LPF
TRIGL SERPL-MCNC: 80 MG/DL
TSH SERPL DL<=0.005 MIU/L-ACNC: 1.43 UIU/ML (ref 0.3–4.2)
UROBILINOGEN UR STRIP-ACNC: 1 E.U./DL
VIT B12 SERPL-MCNC: 448 PG/ML (ref 232–1245)
VIT D+METAB SERPL-MCNC: 69 NG/ML (ref 20–50)
WBC # BLD AUTO: 5.1 10E3/UL (ref 4–11)
WBC #/AREA URNS AUTO: ABNORMAL /HPF

## 2023-12-08 PROCEDURE — 81001 URINALYSIS AUTO W/SCOPE: CPT

## 2023-12-08 PROCEDURE — 82607 VITAMIN B-12: CPT

## 2023-12-08 PROCEDURE — 82043 UR ALBUMIN QUANTITATIVE: CPT

## 2023-12-08 PROCEDURE — 36415 COLL VENOUS BLD VENIPUNCTURE: CPT

## 2023-12-08 PROCEDURE — 82570 ASSAY OF URINE CREATININE: CPT

## 2023-12-08 PROCEDURE — 84443 ASSAY THYROID STIM HORMONE: CPT

## 2023-12-08 PROCEDURE — 80053 COMPREHEN METABOLIC PANEL: CPT

## 2023-12-08 PROCEDURE — 80061 LIPID PANEL: CPT

## 2023-12-08 PROCEDURE — 85027 COMPLETE CBC AUTOMATED: CPT

## 2023-12-08 PROCEDURE — 82550 ASSAY OF CK (CPK): CPT

## 2023-12-08 PROCEDURE — 82306 VITAMIN D 25 HYDROXY: CPT

## 2023-12-14 DIAGNOSIS — E83.51 HYPOCALCEMIA: Primary | ICD-10-CM

## 2023-12-14 DIAGNOSIS — R79.89 HIGH SERUM VITAMIN D: ICD-10-CM

## 2023-12-19 NOTE — TELEPHONE ENCOUNTER
"BREO ELLIPTA 200-25MCG/INH AEPB   Last Written Prescription Date:  1/22/2020  Last Fill Quantity: 60,  # refills: 1   Last office visit: 7/20/2019 with prescribing provider:  Alicia Israel APRN CNP   Future Office Visit:  NA    Requested Prescriptions   Pending Prescriptions Disp Refills     BREO ELLIPTA 200-25 MCG/INH Inhaler [Pharmacy Med Name: BREO ELLIPTA 200-25MCG/INH AEPB] 60 each 1     Sig: INHALE ONE PUFF BY MOUTH ONCE DAILY       Inhaled Steroids Protocol Passed - 3/24/2020  1:17 PM        Passed - Patient is age 12 or older        Passed - Recent (12 mo) or future (30 days) visit within the authorizing provider's specialty     Patient has had an office visit with the authorizing provider or a provider within the authorizing providers department within the previous 12 mos or has a future within next 30 days. See \"Patient Info\" tab in inbasket, or \"Choose Columns\" in Meds & Orders section of the refill encounter.              Passed - Medication is active on med list             "
Refill per RN protocol     Cristy Zepeda RN, BSN  [facility] Triage     
Opt out

## 2024-02-15 ENCOUNTER — TRANSFERRED RECORDS (OUTPATIENT)
Dept: HEALTH INFORMATION MANAGEMENT | Facility: CLINIC | Age: 69
End: 2024-02-15
Payer: COMMERCIAL

## 2024-02-20 ENCOUNTER — TRANSFERRED RECORDS (OUTPATIENT)
Dept: HEALTH INFORMATION MANAGEMENT | Facility: CLINIC | Age: 69
End: 2024-02-20
Payer: COMMERCIAL

## 2024-03-01 ENCOUNTER — TRANSFERRED RECORDS (OUTPATIENT)
Dept: HEALTH INFORMATION MANAGEMENT | Facility: CLINIC | Age: 69
End: 2024-03-01
Payer: COMMERCIAL

## 2024-04-11 ENCOUNTER — LAB (OUTPATIENT)
Dept: LAB | Facility: CLINIC | Age: 69
End: 2024-04-11
Payer: COMMERCIAL

## 2024-04-11 DIAGNOSIS — R79.89 HIGH SERUM VITAMIN D: ICD-10-CM

## 2024-04-11 LAB — VIT D+METAB SERPL-MCNC: 59 NG/ML (ref 20–50)

## 2024-04-11 PROCEDURE — 82306 VITAMIN D 25 HYDROXY: CPT | Mod: GZ

## 2024-04-11 PROCEDURE — 80048 BASIC METABOLIC PNL TOTAL CA: CPT | Performed by: FAMILY MEDICINE

## 2024-04-11 PROCEDURE — 36415 COLL VENOUS BLD VENIPUNCTURE: CPT

## 2024-04-12 DIAGNOSIS — E83.51 HYPOCALCEMIA: Primary | ICD-10-CM

## 2024-04-12 DIAGNOSIS — R79.89 HIGH SERUM VITAMIN D: ICD-10-CM

## 2024-04-12 LAB
ANION GAP SERPL CALCULATED.3IONS-SCNC: 16 MMOL/L (ref 7–15)
BUN SERPL-MCNC: 15 MG/DL (ref 8–23)
CALCIUM SERPL-MCNC: 9.1 MG/DL (ref 8.8–10.2)
CHLORIDE SERPL-SCNC: 103 MMOL/L (ref 98–107)
CREAT SERPL-MCNC: 0.81 MG/DL (ref 0.51–0.95)
DEPRECATED HCO3 PLAS-SCNC: 22 MMOL/L (ref 22–29)
EGFRCR SERPLBLD CKD-EPI 2021: 79 ML/MIN/1.73M2
GLUCOSE SERPL-MCNC: 108 MG/DL (ref 70–99)
POTASSIUM SERPL-SCNC: 4.4 MMOL/L (ref 3.4–5.3)
SODIUM SERPL-SCNC: 141 MMOL/L (ref 135–145)

## 2024-04-16 RX ORDER — FAMOTIDINE 20 MG
1 TABLET ORAL DAILY
COMMUNITY
Start: 2024-04-16

## 2024-05-08 ENCOUNTER — HOSPITAL ENCOUNTER (OUTPATIENT)
Dept: CARDIOLOGY | Facility: CLINIC | Age: 69
Discharge: HOME | End: 2024-05-08
Attending: PHYSICIAN ASSISTANT | Admitting: PHYSICIAN ASSISTANT
Payer: COMMERCIAL

## 2024-05-08 DIAGNOSIS — I34.0 NONRHEUMATIC MITRAL VALVE REGURGITATION: ICD-10-CM

## 2024-05-08 LAB — LVEF ECHO: NORMAL

## 2024-05-08 PROCEDURE — 93306 TTE W/DOPPLER COMPLETE: CPT | Mod: 26 | Performed by: INTERNAL MEDICINE

## 2024-05-08 PROCEDURE — 93306 TTE W/DOPPLER COMPLETE: CPT

## 2024-05-16 ENCOUNTER — OFFICE VISIT (OUTPATIENT)
Dept: CARDIOLOGY | Facility: CLINIC | Age: 69
End: 2024-05-16
Attending: PHYSICIAN ASSISTANT
Payer: COMMERCIAL

## 2024-05-16 VITALS
DIASTOLIC BLOOD PRESSURE: 80 MMHG | BODY MASS INDEX: 21.21 KG/M2 | HEIGHT: 64 IN | SYSTOLIC BLOOD PRESSURE: 130 MMHG | OXYGEN SATURATION: 99 % | HEART RATE: 81 BPM | WEIGHT: 124.2 LBS

## 2024-05-16 DIAGNOSIS — I34.0 NONRHEUMATIC MITRAL VALVE REGURGITATION: ICD-10-CM

## 2024-05-16 DIAGNOSIS — E78.2 MIXED HYPERLIPIDEMIA: Primary | ICD-10-CM

## 2024-05-16 PROCEDURE — 99214 OFFICE O/P EST MOD 30 MIN: CPT | Performed by: INTERNAL MEDICINE

## 2024-05-16 PROCEDURE — G2211 COMPLEX E/M VISIT ADD ON: HCPCS | Performed by: INTERNAL MEDICINE

## 2024-05-16 NOTE — PROGRESS NOTES
CARDIOLOGY CLINIC FOLLOW-UP NOTE      REASON FOR VISIT:   Moderate-severe mitral regurgitation due to a combination of degenerative valve disease and posterior leaflet prolapse    PRIMARY CARE PHYSICIAN:  aP [place]        History of Present Illness   Denia Dailey is an extremely pleasant 68 year old female here for routine follow-up.  She has a past medical history significant for moderate-severe mitral regurgitation due to a combination of degenerative valve disease and posterior leaflet prolapse, chronic gastritis/colitis, gastroparesis, GERD, and biliary dyskinesia with symptomatic cholelithiasis s/p laparoscopic cholecystectomy on 9/17/2021.  Her family history is significant for both grandmothers having pacemakers placed late in life.  Her mother also has mitral valve prolapse.  She smoked briefly in her 20s, but has not smoked since.  She rarely drinks alcohol.    Since her last visit in our clinic in May 2023, she reports that she continues to do well from a cardiac standpoint.  She has no exertional chest discomfort or significant exertional shortness of breath.  She gets a little bit winded when walking up an incline, but this is not any worse recently and does not force her to stop.  She continues to stay active by walking 3 miles per day.  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    Her most recent labs are from 4/11/2024 showing normal sodium and potassium and normal renal function.  Her most recent lipids were from 12/8/2023, showing total cholesterol 198, HDL 63, , triglycerides 80, and her hemoglobin and platelets were normal at that time as well.  Her most recent echocardiogram was from 5/8/2024 and showed stable LVEF of 60 to 65% with 3+ MR, unchanged from 5/2023.  She did have a KAYLIN on 5/11/2022 as described above with 3+ MR due to a combination of degenerative valve  disease and posterior leaflet prolapse her most recent ischemic evaluation is from 9/16/2021 which was an exercise stress echo.  Image quality was suboptimal, but she did exercise for a total of 9 minutes on a Yonis protocol, achieving a total of 10 METS and 97% of her maximal predicted heart rate.  EKG was unremarkable other than a 3 beat run of NSVT and occasional PVCs, and other than the poor image quality, there are no obvious echocardiographic findings suggestive of ischemia.  She also had a CT PE protocol on 9/15/2021 which showed no evidence of PE.  I previously had reviewed the images and did not see any obvious calcification of her left coronary system.  The RCA system was not well seen.      Assessment & Plan     Asymptomatic moderate-severe (3+) mitral regurgitation due to a combination of degenerative valve disease and posterior leaflet prolapse  Stable on last TTE from 5/2024  Last KAYLIN 5/2022  Mild dyslipidemia, with ASCVD 10-year risk of less than 7.5%  Chronic gastritis/colitis, gastroparesis, symptomatic cholelithiasis s/p lap fátima on 9/17/2021  FH of breast cancer, with personal history of CHEK2 mutation      It was a pleasure to meet with Denia again in clinic today.  I am glad to hear that she continues to do well from a cardiac standpoint.  Thankfully, her echo shows that her mitral regurgitation is stable over the past year.  Given that she remains asymptomatic, we will continue to monitor with a repeat echocardiogram once per year, or more often if new symptoms develop.  If she remains stable over the next several echoes, I think that we can space this out to every 2 years.  Otherwise, I encouraged her to continue with a heart healthy diet and regular aerobic exercise, and would plan to see her back in 1 year.      -Repeat TTE in 12 months  -Continue excellent diet and exercise regimen      Follow-up: 12 months, with TTE beforehand      Alex Dixon MD  Interventional Cardiology  May 16,  2024        The longitudinal plan of care for the diagnosis(es)/condition(s) as documented were addressed during this visit. Due to the added complexity in care, I will continue to support Denia in the subsequent management and with ongoing continuity of care.        Medications   Current Outpatient Medications   Medication Sig Dispense Refill    alendronate (FOSAMAX) 70 MG tablet Take 1 tablet (70 mg) by mouth every 7 days Pt takes once a week 15 tablet 3    Calcium Carbonate-Vit D-Min (CALCIUM-VITAMIN D-MINERALS) 600-800 MG-UNIT CHEW Take 1 tablet by mouth      cyanocobalamin (CYANOCOBALAMIN) 1000 MCG/ML injection Inject 1 mL into the muscle every 30 days      famotidine (PEPCID) 10 MG tablet Take 10 mg by mouth 2 times daily OTC PRN      magnesium 250 MG tablet Take 1 tablet by mouth daily. 100 tablet 3    mirtazapine (REMERON) 15 MG tablet Take 1 tablet (15 mg) by mouth at bedtime 90 tablet 3    Vitamin D, Cholecalciferol, 25 MCG (1000 UT) CAPS Take 1 capsule by mouth daily      diclofenac (VOLTAREN) 1 % topical gel 1 g      omeprazole (PRILOSEC) 40 MG DR capsule Take 1 capsule (40 mg) by mouth daily (Patient not taking: Reported on 5/16/2024) 180 capsule 3     No current facility-administered medications for this visit.     Allergies   Allergies   Allergen Reactions    Nickel Rash     Accompanied by itching and swelling    Vioxx GI Disturbance     stomach upset         Physical Exam       BP: 130/80 Pulse: 81     SpO2: 99 %      Vital Signs with Ranges  Pulse:  [81] 81  BP: (130)/(80) 130/80  SpO2:  [99 %] 99 %  124 lbs 3.2 oz    Constitutional: Well-appearing, no acute distress  Respiratory: Normal respiratory effort, CTAB  Cardiovascular: RRR, midsystolic click, 2/6 holosystolic murmur at the apex.  JVP < 7 cm H2O.  There is no LE edema.  Normal carotid upstrokes, no carotid bruits.

## 2024-05-16 NOTE — LETTER
5/16/2024    Pa [place] MD  [address] [facility] [address]    RE: Denia Dailey       Dear Colleague,     I had the pleasure of seeing Denia Dailey in the [facility].  CARDIOLOGY CLINIC FOLLOW-UP NOTE      REASON FOR VISIT:   Moderate-severe mitral regurgitation due to a combination of degenerative valve disease and posterior leaflet prolapse    PRIMARY CARE PHYSICIAN:  Pa [place]        History of Present Illness  Denia Dailey is an extremely pleasant 68 year old female here for routine follow-up.  She has a past medical history significant for moderate-severe mitral regurgitation due to a combination of degenerative valve disease and posterior leaflet prolapse, chronic gastritis/colitis, gastroparesis, GERD, and biliary dyskinesia with symptomatic cholelithiasis s/p laparoscopic cholecystectomy on 9/17/2021.  Her family history is significant for both grandmothers having pacemakers placed late in life.  Her mother also has mitral valve prolapse.  She smoked briefly in her 20s, but has not smoked since.  She rarely drinks alcohol.    Since her last visit in our clinic in May 2023, she reports that she continues to do well from a cardiac standpoint.  She has no exertional chest discomfort or significant exertional shortness of breath.  She gets a little bit winded when walking up an incline, but this is not any worse recently and does not force her to stop.  She continues to stay active by walking 3 miles per day.  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    Her most recent labs are from 4/11/2024 showing normal sodium and potassium and normal renal function.  Her most recent lipids were from 12/8/2023, showing total cholesterol 198, HDL 63, , triglycerides 80, and her hemoglobin and platelets were normal at that time as well.  Her most recent  echocardiogram was from 5/8/2024 and showed stable LVEF of 60 to 65% with 3+ MR, unchanged from 5/2023.  She did have a KAYLIN on 5/11/2022 as described above with 3+ MR due to a combination of degenerative valve disease and posterior leaflet prolapse her most recent ischemic evaluation is from 9/16/2021 which was an exercise stress echo.  Image quality was suboptimal, but she did exercise for a total of 9 minutes on a Yonis protocol, achieving a total of 10 METS and 97% of her maximal predicted heart rate.  EKG was unremarkable other than a 3 beat run of NSVT and occasional PVCs, and other than the poor image quality, there are no obvious echocardiographic findings suggestive of ischemia.  She also had a CT PE protocol on 9/15/2021 which showed no evidence of PE.  I previously had reviewed the images and did not see any obvious calcification of her left coronary system.  The RCA system was not well seen.      Assessment & Plan    Asymptomatic moderate-severe (3+) mitral regurgitation due to a combination of degenerative valve disease and posterior leaflet prolapse  Stable on last TTE from 5/2024  Last KAYLIN 5/2022  Mild dyslipidemia, with ASCVD 10-year risk of less than 7.5%  Chronic gastritis/colitis, gastroparesis, symptomatic cholelithiasis s/p lap fátima on 9/17/2021  FH of breast cancer, with personal history of CHEK2 mutation      It was a pleasure to meet with Denia again in clinic today.  I am glad to hear that she continues to do well from a cardiac standpoint.  Thankfully, her echo shows that her mitral regurgitation is stable over the past year.  Given that she remains asymptomatic, we will continue to monitor with a repeat echocardiogram once per year, or more often if new symptoms develop.  If she remains stable over the next several echoes, I think that we can space this out to every 2 years.  Otherwise, I encouraged her to continue with a heart healthy diet and regular aerobic exercise, and would plan to  see her back in 1 year.      -Repeat TTE in 12 months  -Continue excellent diet and exercise regimen      Follow-up: 12 months, with TTE beforehand      Alex Dixon MD  Interventional Cardiology  May 16, 2024        The longitudinal plan of care for the diagnosis(es)/condition(s) as documented were addressed during this visit. Due to the added complexity in care, I will continue to support Denia in the subsequent management and with ongoing continuity of care.        Medications  Current Outpatient Medications   Medication Sig Dispense Refill    alendronate (FOSAMAX) 70 MG tablet Take 1 tablet (70 mg) by mouth every 7 days Pt takes once a week 15 tablet 3    Calcium Carbonate-Vit D-Min (CALCIUM-VITAMIN D-MINERALS) 600-800 MG-UNIT CHEW Take 1 tablet by mouth      cyanocobalamin (CYANOCOBALAMIN) 1000 MCG/ML injection Inject 1 mL into the muscle every 30 days      famotidine (PEPCID) 10 MG tablet Take 10 mg by mouth 2 times daily OTC PRN      magnesium 250 MG tablet Take 1 tablet by mouth daily. 100 tablet 3    mirtazapine (REMERON) 15 MG tablet Take 1 tablet (15 mg) by mouth at bedtime 90 tablet 3    Vitamin D, Cholecalciferol, 25 MCG (1000 UT) CAPS Take 1 capsule by mouth daily      diclofenac (VOLTAREN) 1 % topical gel 1 g      omeprazole (PRILOSEC) 40 MG DR capsule Take 1 capsule (40 mg) by mouth daily (Patient not taking: Reported on 5/16/2024) 180 capsule 3     No current facility-administered medications for this visit.     Allergies  Allergies   Allergen Reactions    Nickel Rash     Accompanied by itching and swelling    Vioxx GI Disturbance     stomach upset         Physical Exam      BP: 130/80 Pulse: 81     SpO2: 99 %      Vital Signs with Ranges  Pulse:  [81] 81  BP: (130)/(80) 130/80  SpO2:  [99 %] 99 %  124 lbs 3.2 oz    Constitutional: Well-appearing, no acute distress  Respiratory: Normal respiratory effort, CTAB  Cardiovascular: RRR, midsystolic click, 2/6 holosystolic murmur at the apex.  JVP <  7 cm H2O.  There is no LE edema.  Normal carotid upstrokes, no carotid bruits.      Thank you for allowing me to participate in the care of your patient.      Sincerely,     Alex Dixon MD     [facility]  cc:   Alex Dixon MD  [address]  [place] [address]

## 2024-05-24 ENCOUNTER — TRANSFERRED RECORDS (OUTPATIENT)
Dept: HEALTH INFORMATION MANAGEMENT | Facility: CLINIC | Age: 69
End: 2024-05-24
Payer: COMMERCIAL

## 2024-07-25 ENCOUNTER — HOSPITAL ENCOUNTER (OUTPATIENT)
Dept: MRI IMAGING | Facility: CLINIC | Age: 69
Discharge: HOME | End: 2024-07-25
Attending: NURSE PRACTITIONER | Admitting: NURSE PRACTITIONER
Payer: COMMERCIAL

## 2024-07-25 DIAGNOSIS — R92.333 HETEROGENEOUSLY DENSE TISSUE OF BOTH BREASTS ON MAMMOGRAPHY: ICD-10-CM

## 2024-07-25 DIAGNOSIS — Z91.89 AT HIGH RISK FOR BREAST CANCER: ICD-10-CM

## 2024-07-25 DIAGNOSIS — Z84.81 FH: GENETIC DISEASE CARRIER: ICD-10-CM

## 2024-07-25 DIAGNOSIS — N64.89 OTHER SPECIFIED DISORDERS OF BREAST: ICD-10-CM

## 2024-07-25 DIAGNOSIS — Z15.01 GENETIC SUSCEPTIBILITY TO MALIGNANT NEOPLASM OF BREAST: ICD-10-CM

## 2024-07-25 DIAGNOSIS — Z80.3 FAMILY HISTORY OF MALIGNANT NEOPLASM OF BREAST: ICD-10-CM

## 2024-07-25 PROCEDURE — 255N000002 HC RX 255 OP 636: Performed by: NURSE PRACTITIONER

## 2024-07-25 PROCEDURE — 77049 MRI BREAST C-+ W/CAD BI: CPT

## 2024-07-25 PROCEDURE — A9585 GADOBUTROL INJECTION: HCPCS | Performed by: NURSE PRACTITIONER

## 2024-07-25 RX ORDER — GADOBUTROL 604.72 MG/ML
6 INJECTION INTRAVENOUS ONCE
Status: COMPLETED | OUTPATIENT
Start: 2024-07-25 | End: 2024-07-25

## 2024-07-25 RX ADMIN — GADOBUTROL 6 ML: 604.72 INJECTION INTRAVENOUS at 14:25

## 2024-07-29 DIAGNOSIS — R92.8 ABNORMAL MRI, BREAST: Primary | ICD-10-CM

## 2024-08-01 ENCOUNTER — HOSPITAL ENCOUNTER (OUTPATIENT)
Dept: MAMMOGRAPHY | Facility: CLINIC | Age: 69
Discharge: HOME | End: 2024-08-01
Attending: NURSE PRACTITIONER
Payer: COMMERCIAL

## 2024-08-01 ENCOUNTER — MEDICAL CORRESPONDENCE (OUTPATIENT)
Dept: MAMMOGRAPHY | Facility: CLINIC | Age: 69
End: 2024-08-01
Payer: COMMERCIAL

## 2024-08-01 ENCOUNTER — HOSPITAL ENCOUNTER (OUTPATIENT)
Dept: ULTRASOUND IMAGING | Facility: CLINIC | Age: 69
Discharge: HOME | End: 2024-08-01
Attending: NURSE PRACTITIONER
Payer: COMMERCIAL

## 2024-08-01 DIAGNOSIS — R92.8 ABNORMAL MRI, BREAST: ICD-10-CM

## 2024-08-01 DIAGNOSIS — Z12.31 VISIT FOR SCREENING MAMMOGRAM: ICD-10-CM

## 2024-08-01 PROCEDURE — 250N000009 HC RX 250: Performed by: RADIOLOGY

## 2024-08-01 PROCEDURE — 76642 ULTRASOUND BREAST LIMITED: CPT | Mod: LT

## 2024-08-01 PROCEDURE — 77062 BREAST TOMOSYNTHESIS BI: CPT

## 2024-08-01 PROCEDURE — 88305 TISSUE EXAM BY PATHOLOGIST: CPT | Mod: TC | Performed by: NURSE PRACTITIONER

## 2024-08-01 PROCEDURE — 272N000615 US BREAST BIOPSY CORE NEEDLE LEFT

## 2024-08-01 PROCEDURE — 999N000065 MA POST PROCEDURE LEFT

## 2024-08-01 RX ADMIN — LIDOCAINE HYDROCHLORIDE 5 ML: 10 INJECTION, SOLUTION EPIDURAL; INFILTRATION; INTRACAUDAL; PERINEURAL at 13:13

## 2024-08-01 NOTE — DISCHARGE INSTRUCTIONS

## 2024-08-02 ENCOUNTER — TELEPHONE (OUTPATIENT)
Dept: MAMMOGRAPHY | Facility: CLINIC | Age: 69
End: 2024-08-02
Payer: COMMERCIAL

## 2024-08-02 LAB
PATH REPORT.COMMENTS IMP SPEC: NORMAL
PATH REPORT.FINAL DX SPEC: NORMAL
PATH REPORT.GROSS SPEC: NORMAL
PATH REPORT.MICROSCOPIC SPEC OTHER STN: NORMAL
PATH REPORT.RELEVANT HX SPEC: NORMAL
PHOTO IMAGE: NORMAL

## 2024-08-02 PROCEDURE — 88305 TISSUE EXAM BY PATHOLOGIST: CPT | Mod: 26 | Performed by: PATHOLOGY

## 2024-08-02 NOTE — TELEPHONE ENCOUNTER
Pathology report reviewed with our breast radiologist Dr. Merchant, who confirmed the recent breast imaging is concordant with the final pathology results below.    I phoned Denia, confirmed her full name, date of birth, and informed patient of her ultrasund Guided Left Breast Needle Biopsy (8/1/24) results showing benign:    A.  Breast, left, needle core biopsy:  -Fibroadenoma.  -See comment.    Recommended follow up is routine screening.   Patient states no problems or concerns with her biopsy site.   Questions were answered and my phone number given if she has further questions or concerns.  I informed patient I will notify the ordering provider of the results and recommendations for follow up.  Patient verbalized understanding and agrees with the plan of care.     Cayla Boston, RN, BSN   Breast Care Nurse Coordinator  [facility]  [phone]

## 2024-09-24 SDOH — HEALTH STABILITY: PHYSICAL HEALTH: ON AVERAGE, HOW MANY MINUTES DO YOU ENGAGE IN EXERCISE AT THIS LEVEL?: 40 MIN

## 2024-09-24 SDOH — HEALTH STABILITY: PHYSICAL HEALTH: ON AVERAGE, HOW MANY DAYS PER WEEK DO YOU ENGAGE IN MODERATE TO STRENUOUS EXERCISE (LIKE A BRISK WALK)?: 5 DAYS

## 2024-09-24 ASSESSMENT — SOCIAL DETERMINANTS OF HEALTH (SDOH): HOW OFTEN DO YOU GET TOGETHER WITH FRIENDS OR RELATIVES?: THREE TIMES A WEEK

## 2024-09-30 ENCOUNTER — OFFICE VISIT (OUTPATIENT)
Dept: FAMILY MEDICINE | Facility: CLINIC | Age: 69
End: 2024-09-30
Payer: COMMERCIAL

## 2024-09-30 VITALS
HEART RATE: 86 BPM | HEIGHT: 65 IN | OXYGEN SATURATION: 100 % | WEIGHT: 119.4 LBS | BODY MASS INDEX: 19.89 KG/M2 | DIASTOLIC BLOOD PRESSURE: 66 MMHG | TEMPERATURE: 98.7 F | RESPIRATION RATE: 18 BRPM | SYSTOLIC BLOOD PRESSURE: 105 MMHG

## 2024-09-30 DIAGNOSIS — R05.3 CHRONIC COUGH: ICD-10-CM

## 2024-09-30 DIAGNOSIS — K82.8 BILIARY DYSKINESIA: ICD-10-CM

## 2024-09-30 DIAGNOSIS — K21.9 LPRD (LARYNGOPHARYNGEAL REFLUX DISEASE): ICD-10-CM

## 2024-09-30 DIAGNOSIS — E53.8 VITAMIN B12 DEFICIENCY (NON ANEMIC): ICD-10-CM

## 2024-09-30 DIAGNOSIS — K21.00 GASTROESOPHAGEAL REFLUX DISEASE WITH ESOPHAGITIS WITHOUT HEMORRHAGE: ICD-10-CM

## 2024-09-30 DIAGNOSIS — Z15.89 CHEK2 POSITIVE: ICD-10-CM

## 2024-09-30 DIAGNOSIS — Z12.11 SCREEN FOR COLON CANCER: ICD-10-CM

## 2024-09-30 DIAGNOSIS — Z51.81 MEDICATION MONITORING ENCOUNTER: ICD-10-CM

## 2024-09-30 DIAGNOSIS — M81.0 AGE-RELATED OSTEOPOROSIS WITHOUT CURRENT PATHOLOGICAL FRACTURE: ICD-10-CM

## 2024-09-30 DIAGNOSIS — Z00.00 MEDICARE ANNUAL WELLNESS VISIT, SUBSEQUENT: ICD-10-CM

## 2024-09-30 DIAGNOSIS — Z00.00 ROUTINE GENERAL MEDICAL EXAMINATION AT A HEALTH CARE FACILITY: Primary | ICD-10-CM

## 2024-09-30 DIAGNOSIS — Z13.6 CARDIOVASCULAR SCREENING; LDL GOAL LESS THAN 130: ICD-10-CM

## 2024-09-30 DIAGNOSIS — Z12.31 ENCOUNTER FOR SCREENING MAMMOGRAM FOR MALIGNANT NEOPLASM OF BREAST: ICD-10-CM

## 2024-09-30 LAB
ALBUMIN SERPL BCG-MCNC: 4.2 G/DL (ref 3.5–5.2)
ALBUMIN UR-MCNC: NEGATIVE MG/DL
ALP SERPL-CCNC: 63 U/L (ref 40–150)
ALT SERPL W P-5'-P-CCNC: 12 U/L (ref 0–50)
ANION GAP SERPL CALCULATED.3IONS-SCNC: 11 MMOL/L (ref 7–15)
APPEARANCE UR: CLEAR
AST SERPL W P-5'-P-CCNC: 23 U/L (ref 0–45)
BACTERIA #/AREA URNS HPF: ABNORMAL /HPF
BILIRUB SERPL-MCNC: 0.6 MG/DL
BILIRUB UR QL STRIP: NEGATIVE
BUN SERPL-MCNC: 14 MG/DL (ref 8–23)
CALCIUM SERPL-MCNC: 9.2 MG/DL (ref 8.8–10.4)
CHLORIDE SERPL-SCNC: 105 MMOL/L (ref 98–107)
CHOLEST SERPL-MCNC: 213 MG/DL
CK SERPL-CCNC: 74 U/L (ref 26–192)
COLOR UR AUTO: YELLOW
CREAT SERPL-MCNC: 0.8 MG/DL (ref 0.51–0.95)
CREAT UR-MCNC: 24.6 MG/DL
EGFRCR SERPLBLD CKD-EPI 2021: 80 ML/MIN/1.73M2
ERYTHROCYTE [DISTWIDTH] IN BLOOD BY AUTOMATED COUNT: 13.1 % (ref 10–15)
FASTING STATUS PATIENT QL REPORTED: YES
FASTING STATUS PATIENT QL REPORTED: YES
GLUCOSE SERPL-MCNC: 97 MG/DL (ref 70–99)
GLUCOSE UR STRIP-MCNC: NEGATIVE MG/DL
HCO3 SERPL-SCNC: 25 MMOL/L (ref 22–29)
HCT VFR BLD AUTO: 38.8 % (ref 35–47)
HDLC SERPL-MCNC: 64 MG/DL
HGB BLD-MCNC: 13.3 G/DL (ref 11.7–15.7)
HGB UR QL STRIP: ABNORMAL
KETONES UR STRIP-MCNC: NEGATIVE MG/DL
LDLC SERPL CALC-MCNC: 129 MG/DL
LEUKOCYTE ESTERASE UR QL STRIP: ABNORMAL
MCH RBC QN AUTO: 31.1 PG (ref 26.5–33)
MCHC RBC AUTO-ENTMCNC: 34.3 G/DL (ref 31.5–36.5)
MCV RBC AUTO: 91 FL (ref 78–100)
MICROALBUMIN UR-MCNC: <12 MG/L
MICROALBUMIN/CREAT UR: NORMAL MG/G{CREAT}
NITRATE UR QL: NEGATIVE
NONHDLC SERPL-MCNC: 149 MG/DL
PH UR STRIP: 7 [PH] (ref 5–7)
PLATELET # BLD AUTO: 213 10E3/UL (ref 150–450)
POTASSIUM SERPL-SCNC: 4.3 MMOL/L (ref 3.4–5.3)
PROT SERPL-MCNC: 6.8 G/DL (ref 6.4–8.3)
RBC # BLD AUTO: 4.27 10E6/UL (ref 3.8–5.2)
RBC #/AREA URNS AUTO: ABNORMAL /HPF
SODIUM SERPL-SCNC: 141 MMOL/L (ref 135–145)
SP GR UR STRIP: 1.01 (ref 1–1.03)
SQUAMOUS #/AREA URNS AUTO: ABNORMAL /LPF
TRIGL SERPL-MCNC: 99 MG/DL
UROBILINOGEN UR STRIP-ACNC: 0.2 E.U./DL
VIT B12 SERPL-MCNC: 439 PG/ML (ref 232–1245)
VIT D+METAB SERPL-MCNC: 64 NG/ML (ref 20–50)
WBC # BLD AUTO: 4.3 10E3/UL (ref 4–11)
WBC #/AREA URNS AUTO: ABNORMAL /HPF

## 2024-09-30 PROCEDURE — 80053 COMPREHEN METABOLIC PANEL: CPT | Performed by: FAMILY MEDICINE

## 2024-09-30 PROCEDURE — 82043 UR ALBUMIN QUANTITATIVE: CPT | Performed by: FAMILY MEDICINE

## 2024-09-30 PROCEDURE — 36415 COLL VENOUS BLD VENIPUNCTURE: CPT | Performed by: FAMILY MEDICINE

## 2024-09-30 PROCEDURE — 81001 URINALYSIS AUTO W/SCOPE: CPT | Performed by: FAMILY MEDICINE

## 2024-09-30 PROCEDURE — 82607 VITAMIN B-12: CPT | Performed by: FAMILY MEDICINE

## 2024-09-30 PROCEDURE — G0402 INITIAL PREVENTIVE EXAM: HCPCS | Performed by: FAMILY MEDICINE

## 2024-09-30 PROCEDURE — 80061 LIPID PANEL: CPT | Performed by: FAMILY MEDICINE

## 2024-09-30 PROCEDURE — 82306 VITAMIN D 25 HYDROXY: CPT | Performed by: FAMILY MEDICINE

## 2024-09-30 PROCEDURE — 82550 ASSAY OF CK (CPK): CPT | Performed by: FAMILY MEDICINE

## 2024-09-30 PROCEDURE — 85027 COMPLETE CBC AUTOMATED: CPT | Performed by: FAMILY MEDICINE

## 2024-09-30 PROCEDURE — 82570 ASSAY OF URINE CREATININE: CPT | Performed by: FAMILY MEDICINE

## 2024-09-30 RX ORDER — FAMOTIDINE 20 MG/1
20 TABLET, FILM COATED ORAL AT BEDTIME
COMMUNITY
Start: 2024-09-30

## 2024-09-30 RX ORDER — MIRTAZAPINE 15 MG/1
15 TABLET, FILM COATED ORAL AT BEDTIME
Qty: 90 TABLET | Refills: 3 | Status: SHIPPED | OUTPATIENT
Start: 2024-09-30

## 2024-09-30 RX ORDER — ALENDRONATE SODIUM 70 MG/1
70 TABLET ORAL
Qty: 15 TABLET | Refills: 3 | Status: SHIPPED | OUTPATIENT
Start: 2024-09-30

## 2024-09-30 NOTE — PROGRESS NOTES
Preventive Care Visit  [facility] [place]  Pa Montana MD, Family Medicine  Sep 30, 2024      Assessment & Plan     Routine general medical examination at a health care facility    - Comprehensive metabolic panel  - Lipid panel reflex to direct LDL Fasting  - CBC with platelets  - CK total  - UA Macroscopic with reflex to Microscopic and Culture  - Albumin Random Urine Quantitative with Creat Ratio  - Fecal colorectal cancer screen FIT  - Vitamin B12  - Vitamin D Deficiency  - Comprehensive metabolic panel  - Lipid panel reflex to direct LDL Fasting  - CBC with platelets  - CK total  - UA Macroscopic with reflex to Microscopic and Culture  - Albumin Random Urine Quantitative with Creat Ratio  - Vitamin B12  - Vitamin D Deficiency    Medicare annual wellness visit, subsequent    - Comprehensive metabolic panel  - Lipid panel reflex to direct LDL Fasting  - CBC with platelets  - CK total  - UA Macroscopic with reflex to Microscopic and Culture  - Albumin Random Urine Quantitative with Creat Ratio  - Fecal colorectal cancer screen FIT  - Vitamin B12  - Vitamin D Deficiency  - Comprehensive metabolic panel  - Lipid panel reflex to direct LDL Fasting  - CBC with platelets  - CK total  - UA Macroscopic with reflex to Microscopic and Culture  - Albumin Random Urine Quantitative with Creat Ratio  - Vitamin B12  - Vitamin D Deficiency    CHEK2 positive      CARDIOVASCULAR SCREENING; LDL GOAL LESS THAN 130    - Comprehensive metabolic panel  - Lipid panel reflex to direct LDL Fasting  - CK total  - Comprehensive metabolic panel  - Lipid panel reflex to direct LDL Fasting  - CK total    Chronic cough    - famotidine (PEPCID) 20 MG tablet    Gastroesophageal reflux disease with esophagitis without hemorrhage    - CBC with platelets  - CBC with platelets  - famotidine (PEPCID) 20 MG tablet    LPRD (laryngopharyngeal reflux disease)    - famotidine (PEPCID) 20 MG tablet    Age-related osteoporosis without current  pathological fracture    - Comprehensive metabolic panel  - Vitamin D Deficiency  - Comprehensive metabolic panel  - Vitamin D Deficiency  - alendronate (FOSAMAX) 70 MG tablet  Dispense: 15 tablet; Refill: 3    Vitamin B12 deficiency (non anemic)    - Vitamin B12  - Vitamin B12    Biliary dyskinesia    - mirtazapine (REMERON) 15 MG tablet  Dispense: 90 tablet; Refill: 3    Screen for colon cancer    - Fecal colorectal cancer screen FIT    Encounter for screening mammogram for malignant neoplasm of breast      Medication monitoring encounter    - Comprehensive metabolic panel  - Lipid panel reflex to direct LDL Fasting  - CBC with platelets  - CK total  - UA Macroscopic with reflex to Microscopic and Culture  - Albumin Random Urine Quantitative with Creat Ratio  - Fecal colorectal cancer screen FIT  - Vitamin B12  - Vitamin D Deficiency  - Comprehensive metabolic panel  - Lipid panel reflex to direct LDL Fasting  - CBC with platelets  - CK total  - UA Macroscopic with reflex to Microscopic and Culture  - Albumin Random Urine Quantitative with Creat Ratio  - Vitamin B12  - Vitamin D Deficiency    Patient has been advised of split billing requirements and indicates understanding: Yes    Counseling  Appropriate preventive services were addressed with this patient via screening, questionnaire, or discussion as appropriate for fall prevention, nutrition, physical activity, Tobacco-use cessation, social engagement, weight loss and cognition.  Checklist reviewing preventive services available has been given to the patient.  Reviewed patient's diet, addressing concerns and/or questions.     Regular exercise    Plan:    1) Medications: reviewed, refilled    2) Labs: pending    3) Immunizations: Covid / flu at pharmacy    4) Imaging/Diagnostics: Mammo/MRI breast, colonoscopy    5) Consults: Ob/Gyn, Oncology - Dr Stuart, Heart - Dr Dixon, Ortho - Dr Madison    32 minutes spent by myself on the date of the encounter doing chart  review, history and exam, documentation and further activities per the note.    Marilia Loza is a 69 year old, presenting for the following:  Physical and Medicare Visit        9/30/2024     8:15 AM   Additional Questions   Roomed by Alexsander NAPIER   Accompanied by self        Health Care Directive  Patient does not have a Health Care Directive or Living Will: Discussed advance care planning with patient; information given to patient to review.          9/24/2024   General Health   How would you rate your overall physical health? Good   Feel stress (tense, anxious, or unable to sleep) Not at all            9/24/2024   Nutrition   Diet: Regular (no restrictions)            9/24/2024   Exercise   Days per week of moderate/strenous exercise 5 days   Average minutes spent exercising at this level 40 min            9/24/2024   Social Factors   Frequency of gathering with friends or relatives Three times a week   Worry food won't last until get money to buy more No   Food not last or not have enough money for food? No   Do you have housing? (Housing is defined as stable permanent housing and does not include staying ouside in a car, in a tent, in an abandoned building, in an overnight shelter, or couch-surfing.) Yes   Are you worried about losing your housing? No   Lack of transportation? No   Unable to get utilities (heat,electricity)? No            9/30/2024   Fall Risk   Gait Speed Test (Document in seconds) 3.4   Gait Speed Test Interpretation Less than or equal to 5.00 seconds - PASS             9/24/2024   Activities of Daily Living- Home Safety   Needs help with the following daily activites None of the above   Safety concerns in the home None of the above            9/24/2024   Dental   Dentist two times every year? Yes            9/24/2024   Hearing Screening   Hearing concerns? None of the above            9/24/2024   Driving Risk Screening   Patient/family members have concerns about driving No             9/24/2024   General Alertness/Fatigue Screening   Have you been more tired than usual lately? No            9/24/2024   Urinary Incontinence Screening   Bothered by leaking urine in past 6 months No            9/24/2024   TB Screening   Were you born outside of the [place]? No            Today's PHQ-2 Score:       9/29/2024     4:30 PM   PHQ-2 ( 1999 Pf[employer])   Q1: Little interest or pleasure in doing things 0   Q2: Feeling down, depressed or hopeless 0   PHQ-2 Score 0   Q1: Little interest or pleasure in doing things Not at all   Q2: Feeling down, depressed or hopeless Not at all   PHQ-2 Score 0           9/24/2024   Substance Use   Alcohol more than 3/day or more than 7/wk No   Do you have a current opioid prescription? No   How severe/bad is pain from 1 to 10? 0/10 (No Pain)   Do you use any other substances recreationally? No        Social History     Tobacco Use    Smoking status: Former     Current packs/day: 0.00     Types: Cigarettes    Smokeless tobacco: Never    Tobacco comments:     Smoked 1975 to 1985 - 1/2 ppd   Vaping Use    Vaping status: Never Used   Substance Use Topics    Alcohol use: Not Currently     Alcohol/week: 0.0 - 1.0 standard drinks of alcohol     Comment: Social 1-2 glasses of wine/month    Drug use: No           8/1/2024   LAST FHS-7 RESULTS   1st degree relative breast or ovarian cancer Yes   Any relative bilateral breast cancer No   Any male have breast cancer No   Any ONE woman have BOTH breast AND ovarian cancer No   Any woman with breast cancer before 50yrs No   2 or more relatives with breast AND/OR ovarian cancer Yes   2 or more relatives with breast AND/OR bowel cancer Yes             Annual mammogram  / MRI per month    History of abnormal Pap smear: No - age 65 or older with adequate negative prior screening test results (3 consecutive negative cytology results, 2 consecutive negative cotesting results, or 2 consecutive negative HrHPV test results within 10 years, with the most  recent test occurring within the recommended screening interval for the test used)        Latest Ref Rng & Units 2022     2:30 PM 2019    12:00 AM 2016    12:00 AM   PAP / HPV   PAP  Negative for Intraepithelial Lesion or Malignancy (NILM)      HPV 16 DNA Negative Negative      HPV 18 DNA Negative Negative      Other HR HPV Negative Negative      PAP-ABSTRACT   See Scanned Document     See Scanned Document           This result is from an external source.     ASCVD Risk   The 10-year ASCVD risk score (Violeta SPAULDING, et al., 2019) is: 5.1%    Values used to calculate the score:      Age: 69 years      Sex: Female      Is Non- : No      Diabetic: No      Tobacco smoker: No      Systolic Blood Pressure: 105 mmHg      Is BP treated: No      HDL Cholesterol: 63 mg/dL      Total Cholesterol: 198 mg/dL    Fracture Risk Assessment Tool  Link to Frax Calculator  Use the information below to complete the Frax calculator  : 1955  Sex: female  Weight (kg): 54.2 kg (actual weight)  Height (cm): 164.5 cm  Previous Fragility Fracture:  No  History of parent with fractured hip:  No  Current Smoking:  No  Patient has been on glucocorticoids for more than 3 months (5mg/day or more): No  Rheumatoid Arthritis on Problem List:  No  Secondary Osteoporosis on Problem List:  No  Consumes 3 or more units of alcohol per day: No  Femoral Neck BMD (g/cm2)              Reviewed and updated as needed this visit by Provider                  Current providers sharing in care for this patient include:  Patient Care Team:  Pa Montana MD as PCP - General (Family Practice)  Pa Montana MD as Assigned PCP  Tati Balderas APRN CNP as Nurse Practitioner (Cardiovascular Disease)  Alex Dixon MD as MD (Cardiovascular Disease)  Alex Dixon MD as Assigned Heart and Vascular Provider    The following health maintenance items are reviewed in Epic and correct as of today:  Health  Maintenance   Topic Date Due    LUNG CANCER SCREENING  09/15/2022    INFLUENZA VACCINE (1) 09/01/2024    COVID-19 Vaccine (5 - 2024-25 season) 09/01/2024    ANNUAL REVIEW OF HM ORDERS  11/22/2024    MEDICARE ANNUAL WELLNESS VISIT  09/30/2025    FALL RISK ASSESSMENT  09/30/2025    DEXA  02/01/2026    MAMMO SCREENING  08/01/2026    GLUCOSE  04/11/2027    DTAP/TDAP/TD IMMUNIZATION (2 - Td or Tdap) 02/19/2028    LIPID  12/08/2028    ADVANCE CARE PLANNING  09/30/2029    COLORECTAL CANCER SCREENING  12/31/2029    HEPATITIS C SCREENING  Completed    PHQ-2 (once per calendar year)  Completed    Pneumococcal Vaccine: 65+ Years  Completed    ZOSTER IMMUNIZATION  Completed    RSV VACCINE  Completed    HPV IMMUNIZATION  Aged Out    MENINGITIS IMMUNIZATION  Aged Out    RSV MONOCLONAL ANTIBODY  Aged Out    PAP  Discontinued     Patient Active Problem List   Diagnosis    Thoracic or lumbosacral neuritis or radiculitis, unspecified    Lumbago    CARDIOVASCULAR SCREENING; LDL GOAL LESS THAN 130    GERD (gastroesophageal reflux disease)    Chronic cough    Gastroparesis    LPRD (laryngopharyngeal reflux disease)    Mitral regurgitation    Vitamin B12 deficiency (non anemic)    Osteoporosis    CHEK2 positive       Past Medical History:   Diagnosis Date    C. difficile colitis     CARDIOVASCULAR SCREENING; LDL GOAL LESS THAN 130     CHEK2 positive     [facility] Dr Stuart    Chronic cough     Gastroparesis 2019    mild/moderate - [facility]    GERD (gastroesophageal reflux disease)     Helicobacter pylori (H. pylori) infection     LPRD (laryngopharyngeal reflux disease)     Dr Hernandez - cough    Mitral regurgitation     moderate to severe, mild MVP, Dr Dixon    Nephrolithiasis 05/2021    bilateral, small    Osteoporosis 02/2023    Right leg numbness     Residual from lumbosacral neuritis    Thoracic or lumbosacral neuritis or radiculitis, unspecified 05/02/2007    Disc Herniation at L4-5, L5-S1    Unintentional weight loss 2021    Vitamin B12  deficiency (non anemic) 2021    Dr Stuart       Past Surgical History:   Procedure Laterality Date    BUNIONECTOMY Right 2009    COLONOSCOPY  7/09, 9/19    Diverticulosis, Ct Colonography, due 5 yrs    DAVINCI LAPAROSCOPIC CHOLECYSTECTOMY WITH GRAMS N/A 09/17/2021    Procedure: CHOLECYSTECTOMY, ROBOT-ASSISTED, LAPAROSCOPIC, WITH CHOLANGIOGRAM;  Surgeon: Chante Willis MD;  Location: [facility] OR    ESOPHAGOSCOPY, GASTROSCOPY, DUODENOSCOPY (EGD), COMBINED N/A 03/15/2016    chronic gastritis    ESOPHAGOSCOPY, GASTROSCOPY, DUODENOSCOPY (EGD), COMBINED N/A 09/14/2021    Procedure: ESOPHAGOGASTRODUODENOSCOPY, WITH BIOPSies using biopsy forceps;  Surgeon: Tomasz Andres MD;  Location:  GI    Fractured jaw  1966    SURGICAL HISTORY OF -  Left 07/2024    Left breast bx    ZZC CLOSED RX NOSE/JAW FRAC+WIRES  1966    Z DISKECTOMY,PERCUTANEOUS LUMBAR  07/10/2007    L5-S1 with nerve root decompression       Current Outpatient Medications   Medication Sig Dispense Refill    alendronate (FOSAMAX) 70 MG tablet Take 1 tablet (70 mg) by mouth every 7 days. Pt takes once a week 15 tablet 3    Calcium Carbonate-Vit D-Min (CALCIUM-VITAMIN D-MINERALS) 600-800 MG-UNIT CHEW Take 1 tablet by mouth      cyanocobalamin (CYANOCOBALAMIN) 1000 MCG/ML injection Inject 1 mL into the muscle every 30 days      famotidine (PEPCID) 20 MG tablet Take 1 tablet (20 mg) by mouth at bedtime.      magnesium 250 MG tablet Take 1 tablet by mouth daily. 100 tablet 3    mirtazapine (REMERON) 15 MG tablet Take 1 tablet (15 mg) by mouth at bedtime. 90 tablet 3    Vitamin D, Cholecalciferol, 25 MCG (1000 UT) CAPS Take 1 capsule by mouth daily         Allergies   Allergen Reactions    Nickel Rash     Accompanied by itching and swelling    Vioxx GI Disturbance     stomach upset       Family History   Problem Relation Age of Onset    Breast Cancer Mother     Kidney Cancer Father         renal cell CA    Breast Cancer Sister         CHEK 2 mutation    Coronary Artery  Disease Maternal Grandmother     Coronary Artery Disease Paternal Grandmother     Breast Cancer Maternal Aunt 70    Multiple myeloma Maternal Aunt     Coronary Artery Disease Paternal Uncle     Ovarian Cancer No family hx of        Social History     Socioeconomic History    Marital status:      Spouse name: Haroldo    Number of children: 0    Years of education: 16    Highest education level: None   Occupational History    Occupation: NURSE     Employer: [id number]   Tobacco Use    Smoking status: Former     Current packs/day: 0.00     Types: Cigarettes    Smokeless tobacco: Never    Tobacco comments:     Light   Vaping Use    Vaping status: Never Used   Substance and Sexual Activity    Alcohol use: Not Currently     Alcohol/week: 0.0 - 1.0 standard drinks of alcohol     Comment: Social 1-2 glasses of wine/month    Drug use: No    Sexual activity: Never   Other Topics Concern    Parent/sibling w/ CABG, MI or angioplasty before 65F 55M? No     Social Determinants of Health     Financial Resource Strain: Low Risk  (9/24/2024)    Financial Resource Strain     Within the past 12 months, have you or your family members you live with been unable to get utilities (heat, electricity) when it was really needed?: No   Food Insecurity: Low Risk  (9/24/2024)    Food Insecurity     Within the past 12 months, did you worry that your food would run out before you got money to buy more?: No     Within the past 12 months, did the food you bought just not last and you didn t have money to get more?: No   Transportation Needs: Low Risk  (9/24/2024)    Transportation Needs     Within the past 12 months, has lack of transportation kept you from medical appointments, getting your medicines, non-medical meetings or appointments, work, or from getting things that you need?: No   Physical Activity: Sufficiently Active (9/24/2024)    Exercise Vital Sign     Days of Exercise per Week: 5 days     Minutes  of Exercise per Session: 40 min   Stress: No Stress Concern Present (9/24/2024)    [employer] - Occupational Stress Questionnaire     Feeling of Stress : Not at all   Social Connections: Unknown (9/24/2024)    Social Connection and Isolation Panel [NHANES]     Frequency of Social Gatherings with Friends and Family: Three times a week   Interpersonal Safety: Low Risk  (9/30/2024)    Interpersonal Safety     Do you feel physically and emotionally safe where you currently live?: Yes     Within the past 12 months, have you been hit, slapped, kicked or otherwise physically hurt by someone?: No     Within the past 12 months, have you been humiliated or emotionally abused in other ways by your partner or ex-partner?: No   Housing Stability: Low Risk  (9/24/2024)    Housing Stability     Do you have housing? : Yes     Are you worried about losing your housing?: No     CHEK2 Gene - Colonoscopy every 5 yrs, Mammo/MRI breast every 6 months alternating    Lipids    Recent Labs   Lab Test 12/08/23  0848 10/10/22  0912   CHOL 198 195   HDL 63 68   * 108*   TRIG 80 97     Chronic cough - improved    GERD / LRPD - stable - using famotidine - at [facility]    Osteoporosis    B12     MVP / MVR - Dr Dixon    Nephrolithiasis - no issues    Colonoscopy: 10/2024  FIT / Cologuard: NA  Pap: NA  Mammogram: alternating every 6 months with MRI  DEXA: last 2/2023    Review of Systems  CONSTITUTIONAL: NEGATIVE for fever, chills, change in weight  INTEGUMENTARY/SKIN: NEGATIVE for worrisome rashes, moles or lesions  EYES: NEGATIVE for vision changes or irritation  ENT/MOUTH: NEGATIVE for ear, mouth and throat problems  RESP: NEGATIVE for significant cough or SOB  CV: NEGATIVE for chest pain, palpitations or peripheral edema  GI: NEGATIVE for nausea, abdominal pain, heartburn, or change in bowel habits  : NEGATIVE for frequency, dysuria, or hematuria  MUSCULOSKELETAL: NEGATIVE for significant arthralgias or  "myalgia  NEURO: NEGATIVE for weakness, dizziness or paresthesias  ENDOCRINE: NEGATIVE for temperature intolerance, skin/hair changes  HEME: NEGATIVE for bleeding problems  PSYCHIATRIC: NEGATIVE for changes in mood or affect     Objective    Exam  /66   Pulse 86   Temp 98.7  F (37.1  C) (Tympanic)   Resp 18   Ht 1.645 m (5' 4.75\")   Wt 54.2 kg (119 lb 6.4 oz)   LMP 08/08/2009   SpO2 100%   BMI 20.02 kg/m     Estimated body mass index is 20.02 kg/m  as calculated from the following:    Height as of this encounter: 1.645 m (5' 4.75\").    Weight as of this encounter: 54.2 kg (119 lb 6.4 oz).    Physical Exam  GENERAL: alert and no distress  EYES: Eyes grossly normal to inspection, PERRL and conjunctivae and sclerae normal  HENT: ear canals and TM's normal, nose and mouth without ulcers or lesions  NECK: no adenopathy, no asymmetry, masses, or scars  RESP: lungs clear to auscultation - no rales, rhonchi or wheezes  BREAST: per Dr Kirk  CV: regular rate and rhythm, normal S1 S2, no S3 or S4, 1-2/6 systolic murmur at LLSB, click or rub, no peripheral edema  ABDOMEN: soft, nontender, no hepatosplenomegaly, no masses and bowel sounds normal   (female): per Dr Kirk  RECTAL: per Dr Kirk  MS: no gross musculoskeletal defects noted, no edema  SKIN: no suspicious lesions or rashes  NEURO: Normal strength and tone, mentation intact and speech normal  PSYCH: mentation appears normal, affect normal/bright        9/30/2024   Mini Cog   Clock Draw Score 2 Normal   3 Item Recall 3 objects recalled   Mini Cog Total Score 5            Vision Screen  Patient wears corrective lenses (select all that apply): (!) NOT worn during vision screen  Vision Screen Results: Pass  -+    Signed Electronically by:              Pa Montana MD, FAAFP     [facility]  [address] [place] [id number]  [email or web address]   Office: [phone]  Fax: " [phone]

## 2024-09-30 NOTE — LETTER
[facility]  [address] [place] [address]  [phone]                    October 4, 2024    Denia Dailey  [address] [address]      Dear Denia,    Here is a summary of your recent test results:    Labs are overall quite good, except     Mildly elevated vitamin D     We advise:     Continue current cares.   Balanced low cholesterol diet, Mediterranean diet   Regular exercise, 150 minutes per week.     Consider 25% decrease to vitamin D supplements     For additional lab test information, [email or web address] is an excellent reference.     Your test results are enclosed.      Please contact me if you have any questions.    In addition, here is a list of due or overdue Health Maintenance reminders.    Health Maintenance Due   Topic Date Due    LUNG CANCER SCREENING  09/15/2022    Flu Vaccine (1) 09/01/2024    COVID-19 Vaccine (5 - 2024-25 season) 09/01/2024       Please call us at [phone] (or use [facility]) to address the above recommendations.            Thank you very much for trusting [facility].     Healthy regards,          Pa Montana M.D.        Results for orders placed or performed in visit on 09/30/24   Comprehensive metabolic panel     Status: None   Result Value Ref Range    Sodium 141 135 - 145 mmol/L    Potassium 4.3 3.4 - 5.3 mmol/L    Carbon Dioxide (CO2) 25 22 - 29 mmol/L    Anion Gap 11 7 - 15 mmol/L    Urea Nitrogen 14.0 8.0 - 23.0 mg/dL    Creatinine 0.80 0.51 - 0.95 mg/dL    GFR Estimate 80 >60 mL/min/1.73m2    Calcium 9.2 8.8 - 10.4 mg/dL    Chloride 105 98 - 107 mmol/L    Glucose 97 70 - 99 mg/dL    Alkaline Phosphatase 63 40 - 150 U/L    AST 23 0 - 45 U/L    ALT 12 0 - 50 U/L    Protein Total 6.8 6.4 - 8.3 g/dL    Albumin 4.2 3.5 - 5.2 g/dL    Bilirubin Total 0.6 <=1.2 mg/dL    Patient Fasting > 8hrs? Yes    Lipid panel reflex to direct LDL Fasting     Status: Abnormal   Result Value Ref Range     Cholesterol 213 (H) <200 mg/dL    Triglycerides 99 <150 mg/dL    Direct Measure HDL 64 >=50 mg/dL    LDL Cholesterol Calculated 129 (H) <100 mg/dL    Non HDL Cholesterol 149 (H) <130 mg/dL    Patient Fasting > 8hrs? Yes     Narrative    Cholesterol  Desirable: < 200 mg/dL  Borderline High: 200 - 239 mg/dL  High: >= 240 mg/dL    Triglycerides  Normal: < 150 mg/dL  Borderline High: 150 - 199 mg/dL  High: 200-499 mg/dL  Very High: >= 500 mg/dL    Direct Measure HDL  Female: >= 50 mg/dL   Male: >= 40 mg/dL    LDL Cholesterol  Desirable: < 100 mg/dL  Above Desirable: 100 - 129 mg/dL   Borderline High: 130 - 159 mg/dL   High:  160 - 189 mg/dL   Very High: >= 190 mg/dL    Non HDL Cholesterol  Desirable: < 130 mg/dL  Above Desirable: 130 - 159 mg/dL  Borderline High: 160 - 189 mg/dL  High: 190 - 219 mg/dL  Very High: >= 220 mg/dL   CBC with platelets     Status: Normal   Result Value Ref Range    WBC Count 4.3 4.0 - 11.0 10e3/uL    RBC Count 4.27 3.80 - 5.20 10e6/uL    Hemoglobin 13.3 11.7 - 15.7 g/dL    Hematocrit 38.8 35.0 - 47.0 %    MCV 91 78 - 100 fL    MCH 31.1 26.5 - 33.0 pg    MCHC 34.3 31.5 - 36.5 g/dL    RDW 13.1 10.0 - 15.0 %    Platelet Count 213 150 - 450 10e3/uL   CK total     Status: Normal   Result Value Ref Range    CK 74 26 - 192 U/L   UA Macroscopic with reflex to Microscopic and Culture     Status: Abnormal    Specimen: Urine, Midstream   Result Value Ref Range    Color Urine Yellow Colorless, Straw, Light Yellow, Yellow    Appearance Urine Clear Clear    Glucose Urine Negative Negative mg/dL    Bilirubin Urine Negative Negative    Ketones Urine Negative Negative mg/dL    Specific Gravity Urine 1.010 1.003 - 1.035    Blood Urine Trace (A) Negative    pH Urine 7.0 5.0 - 7.0    Protein Albumin Urine Negative Negative mg/dL    Urobilinogen Urine 0.2 0.2, 1.0 E.U./dL    Nitrite Urine Negative Negative    Leukocyte Esterase Urine Small (A) Negative   Albumin Random Urine Quantitative with Creat Ratio      Status: None   Result Value Ref Range    Creatinine Urine mg/dL 24.6 mg/dL    Albumin Urine mg/L <12.0 mg/L    Albumin Urine mg/g Cr     Vitamin B12     Status: Normal   Result Value Ref Range    Vitamin B12 439 232 - 1,245 pg/mL   Vitamin D Deficiency     Status: Abnormal   Result Value Ref Range    Vitamin D, Total (25-Hydroxy) 64 (H) 20 - 50 ng/mL    Narrative    Season, race, dietary intake, and treatment affect the concentration of 25-hydroxy-Vitamin D. Values may decrease during winter months and increase during summer months.    Vitamin D determination is routinely performed by an immunoassay specific for 25 hydroxyvitamin D3.  If an individual is on vitamin D2(ergocalciferol) supplementation, please specify 25 OH vitamin D2 and D3 level determination by LCMSMS test [id number].     UA Microscopic with Reflex to Culture     Status: Abnormal   Result Value Ref Range    Bacteria Urine Few (A) None Seen /HPF    RBC Urine 0-2 0-2 /HPF /HPF    WBC Urine 0-5 0-5 /HPF /HPF    Squamous Epithelials Urine Few (A) None Seen /LPF    Narrative    Urine Culture not indicated

## 2024-10-23 ENCOUNTER — TRANSFERRED RECORDS (OUTPATIENT)
Dept: HEALTH INFORMATION MANAGEMENT | Facility: CLINIC | Age: 69
End: 2024-10-23
Payer: COMMERCIAL

## 2024-11-01 PROBLEM — Z86.0100 HISTORY OF COLONIC POLYPS: Status: ACTIVE | Noted: 2024-10-01

## 2025-03-18 ENCOUNTER — TRANSFERRED RECORDS (OUTPATIENT)
Dept: HEALTH INFORMATION MANAGEMENT | Facility: CLINIC | Age: 70
End: 2025-03-18
Payer: COMMERCIAL

## 2025-05-20 ENCOUNTER — TRANSFERRED RECORDS (OUTPATIENT)
Dept: HEALTH INFORMATION MANAGEMENT | Facility: CLINIC | Age: 70
End: 2025-05-20
Payer: COMMERCIAL

## 2025-05-29 ENCOUNTER — HOSPITAL ENCOUNTER (OUTPATIENT)
Dept: CARDIOLOGY | Facility: CLINIC | Age: 70
Discharge: HOME | End: 2025-05-29
Attending: INTERNAL MEDICINE
Payer: COMMERCIAL

## 2025-05-29 ENCOUNTER — HOSPITAL ENCOUNTER (EMERGENCY)
Facility: CLINIC | Age: 70
Discharge: HOME | End: 2025-05-29
Attending: EMERGENCY MEDICINE | Admitting: EMERGENCY MEDICINE
Payer: COMMERCIAL

## 2025-05-29 VITALS
DIASTOLIC BLOOD PRESSURE: 73 MMHG | HEART RATE: 80 BPM | SYSTOLIC BLOOD PRESSURE: 112 MMHG | RESPIRATION RATE: 16 BRPM | OXYGEN SATURATION: 98 % | TEMPERATURE: 97.8 F

## 2025-05-29 DIAGNOSIS — I34.0 NONRHEUMATIC MITRAL VALVE REGURGITATION: ICD-10-CM

## 2025-05-29 DIAGNOSIS — E78.2 MIXED HYPERLIPIDEMIA: ICD-10-CM

## 2025-05-29 DIAGNOSIS — R55 VASOVAGAL SYNCOPE: ICD-10-CM

## 2025-05-29 LAB
ANION GAP SERPL CALCULATED.3IONS-SCNC: 11 MMOL/L (ref 7–15)
BASOPHILS # BLD AUTO: 0 10E3/UL (ref 0–0.2)
BASOPHILS NFR BLD AUTO: 0 %
BUN SERPL-MCNC: 14.7 MG/DL (ref 8–23)
CALCIUM SERPL-MCNC: 8.8 MG/DL (ref 8.8–10.4)
CHLORIDE SERPL-SCNC: 105 MMOL/L (ref 98–107)
CREAT SERPL-MCNC: 0.81 MG/DL (ref 0.51–0.95)
EGFRCR SERPLBLD CKD-EPI 2021: 78 ML/MIN/1.73M2
EOSINOPHIL # BLD AUTO: 0 10E3/UL (ref 0–0.7)
EOSINOPHIL NFR BLD AUTO: 0 %
ERYTHROCYTE [DISTWIDTH] IN BLOOD BY AUTOMATED COUNT: 13.2 % (ref 10–15)
GLUCOSE SERPL-MCNC: 97 MG/DL (ref 70–99)
HCO3 SERPL-SCNC: 23 MMOL/L (ref 22–29)
HCT VFR BLD AUTO: 37.9 % (ref 35–47)
HGB BLD-MCNC: 12.7 G/DL (ref 11.7–15.7)
HOLD SPECIMEN: NORMAL
IMM GRANULOCYTES # BLD: 0 10E3/UL
IMM GRANULOCYTES NFR BLD: 0 %
LVEF ECHO: NORMAL
LYMPHOCYTES # BLD AUTO: 0.7 10E3/UL (ref 0.8–5.3)
LYMPHOCYTES NFR BLD AUTO: 9 %
MCH RBC QN AUTO: 30.2 PG (ref 26.5–33)
MCHC RBC AUTO-ENTMCNC: 33.5 G/DL (ref 31.5–36.5)
MCV RBC AUTO: 90 FL (ref 78–100)
MONOCYTES # BLD AUTO: 0.5 10E3/UL (ref 0–1.3)
MONOCYTES NFR BLD AUTO: 6 %
NEUTROPHILS # BLD AUTO: 6.8 10E3/UL (ref 1.6–8.3)
NEUTROPHILS NFR BLD AUTO: 85 %
NRBC # BLD AUTO: 0 10E3/UL
NRBC BLD AUTO-RTO: 0 /100
PLATELET # BLD AUTO: 219 10E3/UL (ref 150–450)
POTASSIUM SERPL-SCNC: 4.4 MMOL/L (ref 3.4–5.3)
RBC # BLD AUTO: 4.2 10E6/UL (ref 3.8–5.2)
SODIUM SERPL-SCNC: 139 MMOL/L (ref 135–145)
WBC # BLD AUTO: 8 10E3/UL (ref 4–11)

## 2025-05-29 PROCEDURE — 93005 ELECTROCARDIOGRAM TRACING: CPT

## 2025-05-29 PROCEDURE — 85014 HEMATOCRIT: CPT | Performed by: EMERGENCY MEDICINE

## 2025-05-29 PROCEDURE — 99284 EMERGENCY DEPT VISIT MOD MDM: CPT

## 2025-05-29 PROCEDURE — 36415 COLL VENOUS BLD VENIPUNCTURE: CPT | Performed by: EMERGENCY MEDICINE

## 2025-05-29 PROCEDURE — 80048 BASIC METABOLIC PNL TOTAL CA: CPT | Performed by: EMERGENCY MEDICINE

## 2025-05-29 PROCEDURE — 258N000003 HC RX IP 258 OP 636: Performed by: EMERGENCY MEDICINE

## 2025-05-29 PROCEDURE — 93306 TTE W/DOPPLER COMPLETE: CPT

## 2025-05-29 RX ADMIN — SODIUM CHLORIDE 1000 ML: 9 INJECTION, SOLUTION INTRAVENOUS at 16:43

## 2025-05-29 ASSESSMENT — COLUMBIA-SUICIDE SEVERITY RATING SCALE - C-SSRS
1. IN THE PAST MONTH, HAVE YOU WISHED YOU WERE DEAD OR WISHED YOU COULD GO TO SLEEP AND NOT WAKE UP?: NO
6. HAVE YOU EVER DONE ANYTHING, STARTED TO DO ANYTHING, OR PREPARED TO DO ANYTHING TO END YOUR LIFE?: NO
2. HAVE YOU ACTUALLY HAD ANY THOUGHTS OF KILLING YOURSELF IN THE PAST MONTH?: NO

## 2025-05-29 ASSESSMENT — ACTIVITIES OF DAILY LIVING (ADL)
ADLS_ACUITY_SCORE: 52
ADLS_ACUITY_SCORE: 52

## 2025-05-29 NOTE — ED PROVIDER NOTES
History     Chief Complaint:  Syncope       HPI   Denia Dailey is a 70 year old female with history of MVP, and moderate 2+ mitral rotation, undergoing annual echocardiograms including 1 today, who presents with syncope.  The patient states that she occasionally has fainting episodes and can usually detect them coming on.  She was shopping for flowers in a greenhouse that was warm and humid when she began to feel lightheaded.  She sat down in a cooler area, which would typically help her symptoms, but she continues to feel lightheaded.  She does not believe she lost consciousness as she could hear things happening around her, but a friend said that she thought her eyes may have rolled back.  The episode lasted for a few minutes whereupon the patient felt better and is now remained without dizziness since transport here.  She denies any chest pain, shortness of breath, fall or injury, or other concerns.  Of note, she recently started low-dose preventative tamoxifen but she has not had any issues with this medication.        Independent Historian:   None    Review of External Notes:   Reviewed 9/30/2024 family medicine office visit for comprehensive review of past medical history    Reviewed TTE from today:  Interpretation Summary     Mild mitral valve prolapse, posterior leaflet  There is moderate (2+) mitral regurgitation.  The left ventricle is normal in structure, function and size.  The visual ejection fraction is 60-65%.  The right ventricle is normal in structure, function and size.  The left atrium is mildly dilated.     PISA was not calculated so quantitative assessment of MR severity cannot be  provided. Qualitatively the degree of MR appears to be unchanged since the  last study 5/8/2024  ROS:  Review of Systems    Allergies:  Nickel  Vioxx     Medications:    alendronate (FOSAMAX) 70 MG tablet  Calcium Carbonate-Vit D-Min (CALCIUM-VITAMIN D-MINERALS) 600-800 MG-UNIT CHEW  cyanocobalamin  (CYANOCOBALAMIN) 1000 MCG/ML injection  famotidine (PEPCID) 20 MG tablet  magnesium 250 MG tablet  mirtazapine (REMERON) 15 MG tablet  Vitamin D, Cholecalciferol, 25 MCG (1000 UT) CAPS        Past History:    Past Medical History:   Diagnosis Date    C. difficile colitis     CARDIOVASCULAR SCREENING; LDL GOAL LESS THAN 130     CHEK2 positive     Chronic cough     Gastroparesis 2019    GERD (gastroesophageal reflux disease)     Helicobacter pylori (H. pylori) infection     History of colonic polyps 10/2024    LPRD (laryngopharyngeal reflux disease)     Mitral regurgitation     Nephrolithiasis 05/2021    Osteoporosis 02/2023    Right leg numbness     Thoracic or lumbosacral neuritis or radiculitis, unspecified 05/02/2007    Unintentional weight loss 2021    Vitamin B12 deficiency (non anemic) 2021         Physical Exam     Patient Vitals for the past 24 hrs:  Vitals:    05/29/25 1544   BP: 133/74   Pulse: 85   Resp: 16   Temp: 97.8  F (36.6  C)   TempSrc: Temporal   SpO2: 99%         Physical Exam  Constitutional: Alert, attentive  HENT:    Nose: Nose normal.    Mouth/Throat: Oropharynx is clear, mucous membranes are moist  Eyes: EOM are normal. Pupils are equal, round, and reactive to light.   CV: Regular rate and rhythm, no murmurs, rubs or gallops.  Chest: Effort normal and breath sounds normal.   GI: No distension. There is no tenderness  MSK: Normal range of motion.   Neurological:   A/Ox3;   5/5 strength throughout the upper and lower extremities;   sensation intact to light touch throughout the upper and lower extremities;   normal gait   Skin: Skin is warm and dry.      Emergency Department Course       Results for orders placed or performed during the hospital encounter of 05/29/25   EKG 12-lead, tracing only     Status: None (Preliminary result)   Result Value Ref Range    Systolic Blood Pressure  mmHg    Diastolic Blood Pressure  mmHg    Ventricular Rate 81 BPM    Atrial Rate 81 BPM    AZ Interval 172 ms     QRS Duration 74 ms     ms    QTc 439 ms    P Axis 22 degrees    R AXIS -9 degrees    T Axis -6 degrees    Interpretation ECG       Sinus rhythm  Normal ECG  When compared with ECG of 15-Sep-2021 09:57,  Inverted T waves have replaced nonspecific T wave abnormality in Inferior leads     Results for orders placed or performed during the hospital encounter of 25   Echocardiogram Complete     Status: None   Result Value Ref Range    LVEF  60-65%     [facility]    [id number]  [id number]  [id number]  [id number]^DEE^CHERISE^CANDIDO     [facility]  Echocardiography Laboratory  [address] [place] [address]     Name: FREDDIE ZHONG  MRN: [id number]  : 1955  Study Date: 2025 11:29 AM  Age: 70 yrs  Gender: Female  Patient Location: [facility]  Reason For Study: Nonrheumatic mitral valve regurgitation, Mixed  hyperlipidemia  Ordering Physician: CHERISE PRICE  Referring Physician: Pa Montana  Performed By: Micaela Brown RDCS     BSA: 1.6 m2  Height: 64 in  Weight: 119 lb  HR: 74  BP: 122/77 mmHg  ______________________________________________________________________________  Procedure  Echocardiogram with two-dimensional, color and spectral Doppler.  ______________________________________________________________________________  Interpretation Summary     Mild mitral valve prolapse, posterior leaflet  There is moderate (2+) mitral regurgitation.  The left ventricle is normal in structure, function and size.  The visual ejection fraction is 60-65%.  The right ventricle is normal in structure, function and size.  The left atrium is mildly dilated.     PISA was not calculated so quantitative assessment of MR severity cannot be  provided. Qualitatively the degree of MR appears to be unchanged since the  last study 2024  ______________________________________________________________________________  Left Ventricle  The left ventricle is normal in structure, function and  size. There is normal  left ventricular wall thickness. The visual ejection fraction is 60-65%. Left  ventricular diastolic function is indeterminate. No regional wall motion  abnormalities noted. There is no thrombus seen in the left ventricle.     Right Ventricle  The right ventricle is normal in structure, function and size. There is no  mass or thrombus in the right ventricle.     Atria  The left atrium is mildly dilated. Right atrial size is normal. There is no  atrial shunt seen. The left atrial appendage is not well visualized.     Mitral Valve  Mild mitral valve prolapse, posterior leaflet. There is moderate (2+) mitral  regurgitation. There is no mitral valve stenosis.     Tricuspid Valve  Normal tricuspid valve. The right ventricular systolic pressure is  approximated at 19.6 mmHg plus the right atrial pressure. Right ventricle  systolic pressure estimate normal. There is mild (1+) tricuspid regurgitation.  There is no tricuspid stenosis.     Aortic Valve  The aortic valve is trileaflet. No aortic regurgitation is present. No aortic  stenosis is present.     Pulmonic Valve  Normal pulmonic valve. There is no pulmonic valvular regurgitation. There is  no pulmonic valvular stenosis.     Vessels  The aortic root is normal size. Normal size ascending aorta. Inferior vena  cava not well visualized for estimation of right atrial pressure. The  pulmonary artery is normal size.     Pericardium  The pericardium appears normal. There is no pleural effusion.     Rhythm  Sinus rhythm was noted.  ______________________________________________________________________________  MMode/2D Measurements & Calculations  IVSd: 0.87 cm     LVIDd: 4.0 cm  LVIDs: 2.8 cm  LVPWd: 1.1 cm  FS: 31.3 %  LV mass(C)d: 123.9 grams  LV mass(C)dI: 79.0 grams/m2  Ao root diam: 3.2 cm  LA dimension: 3.4 cm  asc Aorta Diam: 2.9 cm  LA/Ao: 1.1  Ao root diam index Ht(cm/m): 1.9  Ao root diam index BSA (cm/m2): 2.0  Asc Ao diam index BSA (cm/m2):  1.8  Asc Ao diam index Ht(cm/m): 1.8     LA Volume Index (BP): 41.3 ml/m2  RWT: 0.53     Doppler Measurements & Calculations  MV E max lasha: 57.9 cm/sec  MV A max lasha: 76.0 cm/sec  MV E/A: 0.76  MV max P.9 mmHg  MV mean P.8 mmHg  MV V2 VTI: 21.4 cm  MV dec slope: 360.5 cm/sec2  MV dec time: 0.16 sec  MR PISA: 3.5 cm2  PA acc time: 0.09 sec  PI end-d lasha: 57.0 cm/sec  TR max lasha: 221.3 cm/sec  TR max P.6 mmHg  E/E': 9.0  Peak E' Lasha: 6.4 cm/sec     ______________________________________________________________________________  Report approved by: Dr. Vamsi Espinoza on 2025 01:38 PM             Emergency Department Course & Assessments:           Disposition:  The patient was discharged to home.     Impression & Plan      Medical Decision Making:  This a pleasant 70-year-old female with history of intermittent syncope as well as MVP and mitral regurgitation who presents for evaluation after syncopal episode.  Symptoms are consistent with a vasovagal episode.  EKG is negative and labs are reassuring.  She remains asymptomatic in the department and has normal ambulation and gait.  She has no headache, stroke symptoms, or other concerning features.  Plan discharge home with primary care follow-up in 3 to 5 days and return precautions for dizziness, chest pain, shortness of breath, or any other concerns.        Diagnosis:  Visit Diagnosis, Associated Orders, and Comments     ICD-10-CM    1. Vasovagal syncope  R55              Faisal Espinosa MD  25 [id number]

## 2025-05-29 NOTE — DISCHARGE INSTRUCTIONS
It was a pleasure taking care of you today. I hope you feel much better soon.  Your evaluation was reassuring.  Please follow-up with your primary care doctor in 1 week.  Return immediately for chest pain, shortness of breath, or any other concerns.

## 2025-05-29 NOTE — ED TRIAGE NOTES
Patient arrives via EMS.  Had syncopal episode today.  Recent echo.  Hx mitral valve prolapse.  ABCs intact, A&Ox4.  No thinners     Triage Assessment (Adult)       Row Name 05/29/25 1545          Triage Assessment    Airway WDL WDL        Respiratory WDL    Respiratory WDL WDL        Skin Circulation/Temperature WDL    Skin Circulation/Temperature WDL WDL        Cardiac WDL    Cardiac WDL WDL        Peripheral/Neurovascular WDL    Peripheral Neurovascular WDL WDL        Cognitive/Neuro/Behavioral WDL    Cognitive/Neuro/Behavioral WDL WDL

## 2025-05-30 LAB
ATRIAL RATE - MUSE: 81 BPM
DIASTOLIC BLOOD PRESSURE - MUSE: NORMAL MMHG
INTERPRETATION ECG - MUSE: NORMAL
P AXIS - MUSE: 22 DEGREES
PR INTERVAL - MUSE: 172 MS
QRS DURATION - MUSE: 74 MS
QT - MUSE: 378 MS
QTC - MUSE: 439 MS
R AXIS - MUSE: -9 DEGREES
SYSTOLIC BLOOD PRESSURE - MUSE: NORMAL MMHG
T AXIS - MUSE: -6 DEGREES
VENTRICULAR RATE- MUSE: 81 BPM

## 2025-06-03 ENCOUNTER — RESULTS FOLLOW-UP (OUTPATIENT)
Dept: CARDIOLOGY | Facility: CLINIC | Age: 70
End: 2025-06-03

## 2025-06-30 ENCOUNTER — HOSPITAL ENCOUNTER (OUTPATIENT)
Dept: MRI IMAGING | Facility: CLINIC | Age: 70
Discharge: HOME | End: 2025-06-30
Attending: NURSE PRACTITIONER | Admitting: NURSE PRACTITIONER
Payer: COMMERCIAL

## 2025-06-30 DIAGNOSIS — R92.30 INCONCLUSIVE MAMMOGRAPHY DUE TO DENSE BREASTS: ICD-10-CM

## 2025-06-30 DIAGNOSIS — Z15.01 GENETIC SUSCEPTIBILITY TO MALIGNANT NEOPLASM OF BREAST: ICD-10-CM

## 2025-06-30 DIAGNOSIS — R92.2 INCONCLUSIVE MAMMOGRAPHY DUE TO DENSE BREASTS: ICD-10-CM

## 2025-06-30 DIAGNOSIS — N60.22 FIBROADENOSIS OF LEFT BREAST: ICD-10-CM

## 2025-06-30 DIAGNOSIS — Z91.89 AT HIGH RISK FOR BREAST CANCER: ICD-10-CM

## 2025-06-30 DIAGNOSIS — Z80.3 FAMILY HISTORY OF MALIGNANT NEOPLASM OF BREAST: ICD-10-CM

## 2025-06-30 PROCEDURE — 255N000002 HC RX 255 OP 636: Performed by: NURSE PRACTITIONER

## 2025-06-30 PROCEDURE — A9585 GADOBUTROL INJECTION: HCPCS | Performed by: NURSE PRACTITIONER

## 2025-06-30 PROCEDURE — 77049 MRI BREAST C-+ W/CAD BI: CPT

## 2025-06-30 RX ORDER — GADOBUTROL 604.72 MG/ML
5 INJECTION INTRAVENOUS ONCE
Status: COMPLETED | OUTPATIENT
Start: 2025-06-30 | End: 2025-06-30

## 2025-06-30 RX ADMIN — GADOBUTROL 5 ML: 604.72 INJECTION INTRAVENOUS at 11:48

## 2025-07-14 ENCOUNTER — TRANSFERRED RECORDS (OUTPATIENT)
Dept: HEALTH INFORMATION MANAGEMENT | Facility: CLINIC | Age: 70
End: 2025-07-14
Payer: COMMERCIAL

## 2025-08-21 ENCOUNTER — OFFICE VISIT (OUTPATIENT)
Dept: CARDIOLOGY | Facility: CLINIC | Age: 70
End: 2025-08-21
Payer: COMMERCIAL

## 2025-08-21 VITALS
HEART RATE: 88 BPM | WEIGHT: 117.8 LBS | BODY MASS INDEX: 19.63 KG/M2 | HEIGHT: 65 IN | SYSTOLIC BLOOD PRESSURE: 132 MMHG | OXYGEN SATURATION: 100 % | DIASTOLIC BLOOD PRESSURE: 88 MMHG

## 2025-08-21 DIAGNOSIS — I34.0 NONRHEUMATIC MITRAL VALVE REGURGITATION: Primary | ICD-10-CM

## 2025-08-21 DIAGNOSIS — E78.2 MIXED HYPERLIPIDEMIA: ICD-10-CM

## 2025-08-21 RX ORDER — TAMOXIFEN CITRATE 10 MG/1
10 TABLET ORAL EVERY OTHER DAY
COMMUNITY
Start: 2025-05-20

## 2025-09-01 ENCOUNTER — PATIENT OUTREACH (OUTPATIENT)
Dept: CARE COORDINATION | Facility: CLINIC | Age: 70
End: 2025-09-01
Payer: COMMERCIAL

## (undated) DEVICE — CLIP ENDO HEMO-LOC GREEN MED/LG 544230

## (undated) DEVICE — GLOVE PROTEXIS W/NEU-THERA 6.5  2D73TE65

## (undated) DEVICE — DRAPE C-ARM 60X42" 1013

## (undated) DEVICE — LUBRICANT INST ELECTROLUBE EL101

## (undated) DEVICE — DAVINCI XI OBTURATOR BLADELESS 8MM 470359

## (undated) DEVICE — RAD RX ISOVUE 300 (50ML) 61% IOPAMIDOL CHARGE PER ML

## (undated) DEVICE — ENDO POUCH UNIVERSAL RETRIEVAL SYSTEM INZII 5MM CD003

## (undated) DEVICE — ENDO FORCEP ENDOJAW BIOPSY 2.8MMX160CM FB-220K

## (undated) DEVICE — DRAPE BREAST/CHEST 29420

## (undated) DEVICE — LIGHT HANDLE X2

## (undated) DEVICE — SYSTEM CLEARIFY VISUALIZATION 21-345

## (undated) DEVICE — DRAPE SHEET REV FOLD 3/4 9349

## (undated) DEVICE — DAVINCI HOT SHEARS TIP COVER  400180

## (undated) DEVICE — LINEN TOWEL PACK X5 5464

## (undated) DEVICE — ENDO BITE BLOCK ADULT OLYMPUS LATEX FREE MAJ-1632

## (undated) DEVICE — PACK LAP CHOLE SLC15LCFSD

## (undated) DEVICE — SU MONOCRYL 4-0 PS-2 18" UND Y496G

## (undated) DEVICE — ESU GROUND PAD UNIVERSAL W/O CORD

## (undated) DEVICE — PREP CHLORAPREP 26ML TINTED ORANGE  260815

## (undated) DEVICE — SOL WATER IRRIG 1000ML BOTTLE 2F7114

## (undated) DEVICE — DAVINCI XI CLIP APPLIER MED HEM-O-LOK GREEN 470327

## (undated) DEVICE — DAVINCI XI SEAL UNIVERSAL 5-8MM 470361

## (undated) DEVICE — CATH CHOLANGIOGRAM KUMAR CC-019

## (undated) DEVICE — ENDO TROCAR FIRST ENTRY KII FIOS Z-THRD 05X100MM CTF03

## (undated) DEVICE — LUBRICANT INST KIT ENDO-LUBE 220-90

## (undated) DEVICE — SU VICRYL 0 UR-6 27" J603H

## (undated) DEVICE — DAVINCI XI DRAPE COLUMN 470341

## (undated) DEVICE — KIT ENDO TURNOVER/PROCEDURE W/CLEAN A SCOPE LINERS 103888

## (undated) DEVICE — DAVINCI XI DRAPE ARM 470015

## (undated) RX ORDER — LIDOCAINE HYDROCHLORIDE 20 MG/ML
INJECTION, SOLUTION EPIDURAL; INFILTRATION; INTRACAUDAL; PERINEURAL
Status: DISPENSED
Start: 2021-09-17

## (undated) RX ORDER — BUPIVACAINE HYDROCHLORIDE 5 MG/ML
INJECTION, SOLUTION EPIDURAL; INTRACAUDAL
Status: DISPENSED
Start: 2021-09-17

## (undated) RX ORDER — PROPOFOL 10 MG/ML
INJECTION, EMULSION INTRAVENOUS
Status: DISPENSED
Start: 2021-09-17

## (undated) RX ORDER — CEFAZOLIN SODIUM 2 G/100ML
INJECTION, SOLUTION INTRAVENOUS
Status: DISPENSED
Start: 2021-09-17

## (undated) RX ORDER — GLYCOPYRROLATE 0.2 MG/ML
INJECTION, SOLUTION INTRAMUSCULAR; INTRAVENOUS
Status: DISPENSED
Start: 2021-09-17

## (undated) RX ORDER — NEOSTIGMINE METHYLSULFATE 1 MG/ML
VIAL (ML) INJECTION
Status: DISPENSED
Start: 2021-09-17

## (undated) RX ORDER — DEXAMETHASONE SODIUM PHOSPHATE 4 MG/ML
INJECTION, SOLUTION INTRA-ARTICULAR; INTRALESIONAL; INTRAMUSCULAR; INTRAVENOUS; SOFT TISSUE
Status: DISPENSED
Start: 2021-09-17

## (undated) RX ORDER — FENTANYL CITRATE 50 UG/ML
INJECTION, SOLUTION INTRAMUSCULAR; INTRAVENOUS
Status: DISPENSED
Start: 2021-09-17

## (undated) RX ORDER — ONDANSETRON 2 MG/ML
INJECTION INTRAMUSCULAR; INTRAVENOUS
Status: DISPENSED
Start: 2021-09-17

## (undated) RX ORDER — INDOCYANINE GREEN AND WATER 25 MG
KIT INJECTION
Status: DISPENSED
Start: 2021-09-17